# Patient Record
Sex: MALE | Race: WHITE | NOT HISPANIC OR LATINO | Employment: OTHER | URBAN - METROPOLITAN AREA
[De-identification: names, ages, dates, MRNs, and addresses within clinical notes are randomized per-mention and may not be internally consistent; named-entity substitution may affect disease eponyms.]

---

## 2024-07-18 ENCOUNTER — APPOINTMENT (OUTPATIENT)
Dept: CT IMAGING | Facility: HOSPITAL | Age: 69
DRG: 062 | End: 2024-07-18
Payer: MEDICARE

## 2024-07-18 ENCOUNTER — APPOINTMENT (OUTPATIENT)
Dept: GENERAL RADIOLOGY | Facility: HOSPITAL | Age: 69
DRG: 062 | End: 2024-07-18
Payer: MEDICARE

## 2024-07-18 ENCOUNTER — HOSPITAL ENCOUNTER (INPATIENT)
Facility: HOSPITAL | Age: 69
LOS: 5 days | Discharge: HOME | DRG: 062 | End: 2024-07-23
Attending: EMERGENCY MEDICINE | Admitting: INTERNAL MEDICINE
Payer: MEDICARE

## 2024-07-18 DIAGNOSIS — R91.8 RIGHT LOWER LOBE LUNG MASS: ICD-10-CM

## 2024-07-18 DIAGNOSIS — I63.9 CEREBROVASCULAR ACCIDENT (CVA), UNSPECIFIED MECHANISM: Primary | ICD-10-CM

## 2024-07-18 LAB
ABO GROUP BLD: NORMAL
ALBUMIN SERPL-MCNC: 3.9 G/DL (ref 3.5–5.2)
ALBUMIN/GLOB SERPL: 1.4 G/DL
ALP SERPL-CCNC: 113 U/L (ref 39–117)
ALT SERPL W P-5'-P-CCNC: 6 U/L (ref 1–41)
ANION GAP SERPL CALCULATED.3IONS-SCNC: 8.1 MMOL/L (ref 5–15)
APTT PPP: 26.9 SECONDS (ref 22.7–35.4)
AST SERPL-CCNC: 17 U/L (ref 1–40)
BASOPHILS # BLD AUTO: 0.05 10*3/MM3 (ref 0–0.2)
BASOPHILS NFR BLD AUTO: 0.5 % (ref 0–1.5)
BILIRUB SERPL-MCNC: 0.3 MG/DL (ref 0–1.2)
BLD GP AB SCN SERPL QL: NEGATIVE
BUN SERPL-MCNC: 9 MG/DL (ref 8–23)
BUN/CREAT SERPL: 8.7 (ref 7–25)
CALCIUM SPEC-SCNC: 9.1 MG/DL (ref 8.6–10.5)
CHLORIDE SERPL-SCNC: 103 MMOL/L (ref 98–107)
CO2 SERPL-SCNC: 27.9 MMOL/L (ref 22–29)
CREAT SERPL-MCNC: 1.03 MG/DL (ref 0.76–1.27)
DEPRECATED RDW RBC AUTO: 44.3 FL (ref 37–54)
EGFRCR SERPLBLD CKD-EPI 2021: 78.6 ML/MIN/1.73
EOSINOPHIL # BLD AUTO: 0.41 10*3/MM3 (ref 0–0.4)
EOSINOPHIL NFR BLD AUTO: 4 % (ref 0.3–6.2)
ERYTHROCYTE [DISTWIDTH] IN BLOOD BY AUTOMATED COUNT: 13 % (ref 12.3–15.4)
GLOBULIN UR ELPH-MCNC: 2.7 GM/DL
GLUCOSE BLDC GLUCOMTR-MCNC: 122 MG/DL (ref 70–130)
GLUCOSE BLDC GLUCOMTR-MCNC: 96 MG/DL (ref 70–130)
GLUCOSE BLDC GLUCOMTR-MCNC: 96 MG/DL (ref 70–130)
GLUCOSE SERPL-MCNC: 94 MG/DL (ref 65–99)
HCT VFR BLD AUTO: 42.3 % (ref 37.5–51)
HGB BLD-MCNC: 14 G/DL (ref 13–17.7)
HOLD SPECIMEN: NORMAL
HOLD SPECIMEN: NORMAL
IMM GRANULOCYTES # BLD AUTO: 0.02 10*3/MM3 (ref 0–0.05)
IMM GRANULOCYTES NFR BLD AUTO: 0.2 % (ref 0–0.5)
INR PPP: 0.98 (ref 0.9–1.1)
LYMPHOCYTES # BLD AUTO: 3.14 10*3/MM3 (ref 0.7–3.1)
LYMPHOCYTES NFR BLD AUTO: 30.7 % (ref 19.6–45.3)
MCH RBC QN AUTO: 31.3 PG (ref 26.6–33)
MCHC RBC AUTO-ENTMCNC: 33.1 G/DL (ref 31.5–35.7)
MCV RBC AUTO: 94.4 FL (ref 79–97)
MONOCYTES # BLD AUTO: 0.94 10*3/MM3 (ref 0.1–0.9)
MONOCYTES NFR BLD AUTO: 9.2 % (ref 5–12)
NEUTROPHILS NFR BLD AUTO: 5.66 10*3/MM3 (ref 1.7–7)
NEUTROPHILS NFR BLD AUTO: 55.4 % (ref 42.7–76)
NRBC BLD AUTO-RTO: 0 /100 WBC (ref 0–0.2)
PLATELET # BLD AUTO: 226 10*3/MM3 (ref 140–450)
PMV BLD AUTO: 10.1 FL (ref 6–12)
POTASSIUM SERPL-SCNC: 3.7 MMOL/L (ref 3.5–5.2)
PROT SERPL-MCNC: 6.6 G/DL (ref 6–8.5)
PROTHROMBIN TIME: 13.2 SECONDS (ref 11.7–14.2)
RBC # BLD AUTO: 4.48 10*6/MM3 (ref 4.14–5.8)
RH BLD: POSITIVE
SODIUM SERPL-SCNC: 139 MMOL/L (ref 136–145)
T&S EXPIRATION DATE: NORMAL
TROPONIN T SERPL HS-MCNC: 13 NG/L
WBC NRBC COR # BLD AUTO: 10.22 10*3/MM3 (ref 3.4–10.8)
WHOLE BLOOD HOLD COAG: NORMAL
WHOLE BLOOD HOLD SPECIMEN: NORMAL

## 2024-07-18 PROCEDURE — 82948 REAGENT STRIP/BLOOD GLUCOSE: CPT

## 2024-07-18 PROCEDURE — 85025 COMPLETE CBC W/AUTO DIFF WBC: CPT | Performed by: EMERGENCY MEDICINE

## 2024-07-18 PROCEDURE — 25010000002 TENECTEPLASE PER 50 MG: Performed by: EMERGENCY MEDICINE

## 2024-07-18 PROCEDURE — 93005 ELECTROCARDIOGRAM TRACING: CPT | Performed by: EMERGENCY MEDICINE

## 2024-07-18 PROCEDURE — 70496 CT ANGIOGRAPHY HEAD: CPT

## 2024-07-18 PROCEDURE — 3E03317 INTRODUCTION OF OTHER THROMBOLYTIC INTO PERIPHERAL VEIN, PERCUTANEOUS APPROACH: ICD-10-PCS | Performed by: HOSPITALIST

## 2024-07-18 PROCEDURE — 36415 COLL VENOUS BLD VENIPUNCTURE: CPT

## 2024-07-18 PROCEDURE — 82565 ASSAY OF CREATININE: CPT

## 2024-07-18 PROCEDURE — 71250 CT THORAX DX C-: CPT

## 2024-07-18 PROCEDURE — 99291 CRITICAL CARE FIRST HOUR: CPT

## 2024-07-18 PROCEDURE — 84484 ASSAY OF TROPONIN QUANT: CPT | Performed by: EMERGENCY MEDICINE

## 2024-07-18 PROCEDURE — 86901 BLOOD TYPING SEROLOGIC RH(D): CPT | Performed by: EMERGENCY MEDICINE

## 2024-07-18 PROCEDURE — 70498 CT ANGIOGRAPHY NECK: CPT

## 2024-07-18 PROCEDURE — 86850 RBC ANTIBODY SCREEN: CPT | Performed by: EMERGENCY MEDICINE

## 2024-07-18 PROCEDURE — 86900 BLOOD TYPING SEROLOGIC ABO: CPT | Performed by: EMERGENCY MEDICINE

## 2024-07-18 PROCEDURE — 25510000001 IOPAMIDOL PER 1 ML: Performed by: EMERGENCY MEDICINE

## 2024-07-18 PROCEDURE — 0042T HC CT CEREBRAL PERFUSION W/WO CONTRAST: CPT

## 2024-07-18 PROCEDURE — 71045 X-RAY EXAM CHEST 1 VIEW: CPT

## 2024-07-18 PROCEDURE — 85730 THROMBOPLASTIN TIME PARTIAL: CPT | Performed by: EMERGENCY MEDICINE

## 2024-07-18 PROCEDURE — 93010 ELECTROCARDIOGRAM REPORT: CPT | Performed by: INTERNAL MEDICINE

## 2024-07-18 PROCEDURE — 80053 COMPREHEN METABOLIC PANEL: CPT | Performed by: EMERGENCY MEDICINE

## 2024-07-18 PROCEDURE — 85610 PROTHROMBIN TIME: CPT | Performed by: EMERGENCY MEDICINE

## 2024-07-18 RX ORDER — ASPIRIN 300 MG/1
300 SUPPOSITORY RECTAL DAILY
Status: DISCONTINUED | OUTPATIENT
Start: 2024-07-19 | End: 2024-07-23 | Stop reason: HOSPADM

## 2024-07-18 RX ORDER — SODIUM CHLORIDE 0.9 % (FLUSH) 0.9 %
10 SYRINGE (ML) INJECTION AS NEEDED
Status: DISCONTINUED | OUTPATIENT
Start: 2024-07-18 | End: 2024-07-23 | Stop reason: HOSPADM

## 2024-07-18 RX ORDER — ASPIRIN 325 MG
325 TABLET ORAL DAILY
Status: DISCONTINUED | OUTPATIENT
Start: 2024-07-19 | End: 2024-07-23 | Stop reason: HOSPADM

## 2024-07-18 RX ORDER — ATORVASTATIN CALCIUM 80 MG/1
80 TABLET, FILM COATED ORAL NIGHTLY
Status: DISCONTINUED | OUTPATIENT
Start: 2024-07-18 | End: 2024-07-23 | Stop reason: HOSPADM

## 2024-07-18 RX ORDER — SODIUM CHLORIDE 0.9 % (FLUSH) 0.9 %
10 SYRINGE (ML) INJECTION ONCE
Status: DISCONTINUED | OUTPATIENT
Start: 2024-07-18 | End: 2024-07-23 | Stop reason: HOSPADM

## 2024-07-18 RX ORDER — SODIUM CHLORIDE 0.9 % (FLUSH) 0.9 %
10 SYRINGE (ML) INJECTION EVERY 12 HOURS SCHEDULED
Status: DISCONTINUED | OUTPATIENT
Start: 2024-07-18 | End: 2024-07-23 | Stop reason: HOSPADM

## 2024-07-18 RX ORDER — SODIUM CHLORIDE 0.9 % (FLUSH) 0.9 %
10 SYRINGE (ML) INJECTION
Status: COMPLETED | OUTPATIENT
Start: 2024-07-18 | End: 2024-07-18

## 2024-07-18 RX ORDER — SODIUM CHLORIDE 9 MG/ML
40 INJECTION, SOLUTION INTRAVENOUS AS NEEDED
Status: DISCONTINUED | OUTPATIENT
Start: 2024-07-18 | End: 2024-07-23 | Stop reason: HOSPADM

## 2024-07-18 RX ADMIN — ATORVASTATIN CALCIUM 80 MG: 80 TABLET, FILM COATED ORAL at 21:55

## 2024-07-18 RX ADMIN — Medication 21 MG: at 20:25

## 2024-07-18 RX ADMIN — IOPAMIDOL 150 ML: 755 INJECTION, SOLUTION INTRAVENOUS at 20:01

## 2024-07-18 RX ADMIN — Medication 10 ML: at 20:25

## 2024-07-18 NOTE — ED NOTES
Pt arrives via EMS for complaints of left arm numbness and tingling starting after dinner around 1850. Pt presents with flaccid left arm and slight drift to left leg. Pt presents with no facial droop, slurred speech or blurred vision. PTA of EMS pt took one aspirin with no resolution of symptoms.

## 2024-07-19 ENCOUNTER — APPOINTMENT (OUTPATIENT)
Dept: MRI IMAGING | Facility: HOSPITAL | Age: 69
DRG: 062 | End: 2024-07-19
Payer: MEDICARE

## 2024-07-19 ENCOUNTER — APPOINTMENT (OUTPATIENT)
Dept: CARDIOLOGY | Facility: HOSPITAL | Age: 69
DRG: 062 | End: 2024-07-19
Payer: MEDICARE

## 2024-07-19 LAB
ANION GAP SERPL CALCULATED.3IONS-SCNC: 6.3 MMOL/L (ref 5–15)
AORTIC DIMENSIONLESS INDEX: 0.9 (DI)
BASOPHILS # BLD AUTO: 0.05 10*3/MM3 (ref 0–0.2)
BASOPHILS NFR BLD AUTO: 0.4 % (ref 0–1.5)
BH CV ECHO MEAS - AO MAX PG: 9.4 MMHG
BH CV ECHO MEAS - AO MEAN PG: 4.7 MMHG
BH CV ECHO MEAS - AO ROOT DIAM: 3.6 CM
BH CV ECHO MEAS - AO V2 MAX: 153.4 CM/SEC
BH CV ECHO MEAS - AO V2 VTI: 27.5 CM
BH CV ECHO MEAS - AVA(I,D): 2.7 CM2
BH CV ECHO MEAS - EDV(CUBED): 146.2 ML
BH CV ECHO MEAS - EDV(MOD-SP2): 90 ML
BH CV ECHO MEAS - EDV(MOD-SP4): 95 ML
BH CV ECHO MEAS - EF(MOD-BP): 57.2 %
BH CV ECHO MEAS - EF(MOD-SP2): 56.7 %
BH CV ECHO MEAS - EF(MOD-SP4): 58.9 %
BH CV ECHO MEAS - ESV(CUBED): 98.9 ML
BH CV ECHO MEAS - ESV(MOD-SP2): 39 ML
BH CV ECHO MEAS - ESV(MOD-SP4): 39 ML
BH CV ECHO MEAS - FS: 12.2 %
BH CV ECHO MEAS - IVS/LVPW: 0.9 CM
BH CV ECHO MEAS - IVSD: 0.73 CM
BH CV ECHO MEAS - LAT PEAK E' VEL: 12.8 CM/SEC
BH CV ECHO MEAS - LV DIASTOLIC VOL/BSA (35-75): 45.7 CM2
BH CV ECHO MEAS - LV MASS(C)D: 142.6 GRAMS
BH CV ECHO MEAS - LV MAX PG: 7.4 MMHG
BH CV ECHO MEAS - LV MEAN PG: 4 MMHG
BH CV ECHO MEAS - LV SYSTOLIC VOL/BSA (12-30): 18.8 CM2
BH CV ECHO MEAS - LV V1 MAX: 136 CM/SEC
BH CV ECHO MEAS - LV V1 VTI: 24.9 CM
BH CV ECHO MEAS - LVIDD: 5.3 CM
BH CV ECHO MEAS - LVIDS: 4.6 CM
BH CV ECHO MEAS - LVOT AREA: 3 CM2
BH CV ECHO MEAS - LVOT DIAM: 1.96 CM
BH CV ECHO MEAS - LVPWD: 0.82 CM
BH CV ECHO MEAS - MED PEAK E' VEL: 6.3 CM/SEC
BH CV ECHO MEAS - MR MAX PG: 18.6 MMHG
BH CV ECHO MEAS - MR MAX VEL: 215.8 CM/SEC
BH CV ECHO MEAS - MV A DUR: 0.09 SEC
BH CV ECHO MEAS - MV A MAX VEL: 79.3 CM/SEC
BH CV ECHO MEAS - MV DEC SLOPE: 346.8 CM/SEC2
BH CV ECHO MEAS - MV DEC TIME: 198 SEC
BH CV ECHO MEAS - MV E MAX VEL: 75 CM/SEC
BH CV ECHO MEAS - MV E/A: 0.95
BH CV ECHO MEAS - MV MAX PG: 3.4 MMHG
BH CV ECHO MEAS - MV MEAN PG: 1.24 MMHG
BH CV ECHO MEAS - MV P1/2T: 63.5 MSEC
BH CV ECHO MEAS - MV V2 VTI: 21.6 CM
BH CV ECHO MEAS - MVA(P1/2T): 3.5 CM2
BH CV ECHO MEAS - MVA(VTI): 3.5 CM2
BH CV ECHO MEAS - PA ACC TIME: 0.15 SEC
BH CV ECHO MEAS - PA V2 MAX: 110.3 CM/SEC
BH CV ECHO MEAS - PULM A REVS DUR: 0.08 SEC
BH CV ECHO MEAS - PULM A REVS VEL: 42.4 CM/SEC
BH CV ECHO MEAS - PULM DIAS VEL: 56.7 CM/SEC
BH CV ECHO MEAS - PULM S/D: 1.35
BH CV ECHO MEAS - PULM SYS VEL: 76.6 CM/SEC
BH CV ECHO MEAS - RAP SYSTOLE: 3 MMHG
BH CV ECHO MEAS - RV MAX PG: 5.2 MMHG
BH CV ECHO MEAS - RV V1 MAX: 113.8 CM/SEC
BH CV ECHO MEAS - RV V1 VTI: 20.8 CM
BH CV ECHO MEAS - RVSP: 9 MMHG
BH CV ECHO MEAS - SV(LVOT): 75.3 ML
BH CV ECHO MEAS - SV(MOD-SP2): 51 ML
BH CV ECHO MEAS - SV(MOD-SP4): 56 ML
BH CV ECHO MEAS - SVI(LVOT): 36.2 ML/M2
BH CV ECHO MEAS - SVI(MOD-SP2): 24.6 ML/M2
BH CV ECHO MEAS - SVI(MOD-SP4): 27 ML/M2
BH CV ECHO MEAS - TAPSE (>1.6): 2.35 CM
BH CV ECHO MEAS - TR MAX PG: 6 MMHG
BH CV ECHO MEAS - TR MAX VEL: 116.7 CM/SEC
BH CV ECHO MEASUREMENTS AVERAGE E/E' RATIO: 7.85
BH CV ECHO SHUNT ASSESSMENT PERFORMED (HIDDEN SCRIPTING): 1
BH CV XLRA - TDI S': 14.9 CM/SEC
BUN SERPL-MCNC: 8 MG/DL (ref 8–23)
BUN/CREAT SERPL: 8.6 (ref 7–25)
CALCIUM SPEC-SCNC: 8.8 MG/DL (ref 8.6–10.5)
CHLORIDE SERPL-SCNC: 106 MMOL/L (ref 98–107)
CHOLEST SERPL-MCNC: 185 MG/DL (ref 0–200)
CO2 SERPL-SCNC: 26.7 MMOL/L (ref 22–29)
CREAT SERPL-MCNC: 0.93 MG/DL (ref 0.76–1.27)
DEPRECATED RDW RBC AUTO: 46.7 FL (ref 37–54)
EGFRCR SERPLBLD CKD-EPI 2021: 88.9 ML/MIN/1.73
EOSINOPHIL # BLD AUTO: 0.37 10*3/MM3 (ref 0–0.4)
EOSINOPHIL NFR BLD AUTO: 3.3 % (ref 0.3–6.2)
ERYTHROCYTE [DISTWIDTH] IN BLOOD BY AUTOMATED COUNT: 13 % (ref 12.3–15.4)
GLUCOSE BLDC GLUCOMTR-MCNC: 102 MG/DL (ref 70–130)
GLUCOSE BLDC GLUCOMTR-MCNC: 103 MG/DL (ref 70–130)
GLUCOSE BLDC GLUCOMTR-MCNC: 103 MG/DL (ref 70–130)
GLUCOSE BLDC GLUCOMTR-MCNC: 138 MG/DL (ref 70–130)
GLUCOSE SERPL-MCNC: 106 MG/DL (ref 65–99)
HBA1C MFR BLD: 6.2 % (ref 4.8–5.6)
HCT VFR BLD AUTO: 42.1 % (ref 37.5–51)
HDLC SERPL-MCNC: 31 MG/DL (ref 40–60)
HGB BLD-MCNC: 13.8 G/DL (ref 13–17.7)
IMM GRANULOCYTES # BLD AUTO: 0.03 10*3/MM3 (ref 0–0.05)
IMM GRANULOCYTES NFR BLD AUTO: 0.3 % (ref 0–0.5)
LDLC SERPL CALC-MCNC: 137 MG/DL (ref 0–100)
LDLC/HDLC SERPL: 4.39 {RATIO}
LEFT ATRIUM VOLUME INDEX: 18.9 ML/M2
LYMPHOCYTES # BLD AUTO: 2.81 10*3/MM3 (ref 0.7–3.1)
LYMPHOCYTES NFR BLD AUTO: 25.2 % (ref 19.6–45.3)
MCH RBC QN AUTO: 31.4 PG (ref 26.6–33)
MCHC RBC AUTO-ENTMCNC: 32.8 G/DL (ref 31.5–35.7)
MCV RBC AUTO: 95.9 FL (ref 79–97)
MONOCYTES # BLD AUTO: 0.97 10*3/MM3 (ref 0.1–0.9)
MONOCYTES NFR BLD AUTO: 8.7 % (ref 5–12)
NEUTROPHILS NFR BLD AUTO: 6.92 10*3/MM3 (ref 1.7–7)
NEUTROPHILS NFR BLD AUTO: 62.1 % (ref 42.7–76)
NRBC BLD AUTO-RTO: 0 /100 WBC (ref 0–0.2)
PLATELET # BLD AUTO: 221 10*3/MM3 (ref 140–450)
PMV BLD AUTO: 10.6 FL (ref 6–12)
POTASSIUM SERPL-SCNC: 3.7 MMOL/L (ref 3.5–5.2)
RBC # BLD AUTO: 4.39 10*6/MM3 (ref 4.14–5.8)
SODIUM SERPL-SCNC: 139 MMOL/L (ref 136–145)
TRIGL SERPL-MCNC: 90 MG/DL (ref 0–150)
VLDLC SERPL-MCNC: 17 MG/DL (ref 5–40)
WBC NRBC COR # BLD AUTO: 11.15 10*3/MM3 (ref 3.4–10.8)

## 2024-07-19 PROCEDURE — 0 GADOBENATE DIMEGLUMINE 529 MG/ML SOLUTION: Performed by: INTERNAL MEDICINE

## 2024-07-19 PROCEDURE — 97166 OT EVAL MOD COMPLEX 45 MIN: CPT

## 2024-07-19 PROCEDURE — 80048 BASIC METABOLIC PNL TOTAL CA: CPT

## 2024-07-19 PROCEDURE — 82948 REAGENT STRIP/BLOOD GLUCOSE: CPT

## 2024-07-19 PROCEDURE — 97161 PT EVAL LOW COMPLEX 20 MIN: CPT

## 2024-07-19 PROCEDURE — 99222 1ST HOSP IP/OBS MODERATE 55: CPT | Performed by: STUDENT IN AN ORGANIZED HEALTH CARE EDUCATION/TRAINING PROGRAM

## 2024-07-19 PROCEDURE — 83036 HEMOGLOBIN GLYCOSYLATED A1C: CPT | Performed by: INTERNAL MEDICINE

## 2024-07-19 PROCEDURE — 85025 COMPLETE CBC W/AUTO DIFF WBC: CPT

## 2024-07-19 PROCEDURE — 93306 TTE W/DOPPLER COMPLETE: CPT | Performed by: INTERNAL MEDICINE

## 2024-07-19 PROCEDURE — 97116 GAIT TRAINING THERAPY: CPT

## 2024-07-19 PROCEDURE — A9577 INJ MULTIHANCE: HCPCS | Performed by: INTERNAL MEDICINE

## 2024-07-19 PROCEDURE — 70553 MRI BRAIN STEM W/O & W/DYE: CPT

## 2024-07-19 PROCEDURE — 80061 LIPID PANEL: CPT | Performed by: INTERNAL MEDICINE

## 2024-07-19 PROCEDURE — 93306 TTE W/DOPPLER COMPLETE: CPT

## 2024-07-19 PROCEDURE — 97150 GROUP THERAPEUTIC PROCEDURES: CPT

## 2024-07-19 RX ADMIN — Medication 10 ML: at 21:12

## 2024-07-19 RX ADMIN — GADOBENATE DIMEGLUMINE 17 ML: 529 INJECTION, SOLUTION INTRAVENOUS at 17:38

## 2024-07-19 RX ADMIN — Medication 10 ML: at 08:00

## 2024-07-19 RX ADMIN — ATORVASTATIN CALCIUM 80 MG: 80 TABLET, FILM COATED ORAL at 21:10

## 2024-07-19 RX ADMIN — ASPIRIN 325 MG: 325 TABLET ORAL at 21:39

## 2024-07-19 NOTE — PROGRESS NOTES
"  Daily Progress Note.   [facility]  7/19/2024    Patient:  Name:  Roby Ott  MRN:  [id number]  1955  68 y.o.  male         CC:stroke s/p tnk     Interval History:  Reviewed chart, dw overnight intensivist  Pt denies worsening weakness, confusion difficulty with speech  No cp no cough no phelps no sputum no palpitations  Depressed mood    Physical Exam:  /76   Pulse 56   Temp 98.3 °F (36.8 °C) (Oral)   Resp 16   Ht 185.4 cm (73\")   Wt 83.7 kg (184 lb 8.4 oz)   SpO2 92%   BMI 24.35 kg/m²   Body mass index is 24.35 kg/m².    Intake/Output Summary (Last 24 hours) at 7/19/2024 0735  Last data filed at 7/19/2024 0300  Gross per 24 hour   Intake 540 ml   Output --   Net 540 ml     General appearance: Nontoxic, conversant   Eyes: anicteric sclerae, moist conjunctivae; no lidlag;    HENT: Atraumatic; oropharynx clear with moist mucous membranes    Neck: Trachea midline;  supple   Lungs: CTA, with normal respiratory effort and no intercostal retractions  CV: RRR, no rub   Abdomen: Soft, nontender; BS+  Extremities: No peripheral edema or ext lad  Skin: WWP no diffuse visible rash  Psych: Appropriate affect, alert   Neuro: speech intact normal cognition answers questions, weak    Data Review:  Notable Labs:  Results from last 7 days   Lab Units 07/19/24 0401 07/18/24 1948   WBC 10*3/mm3 11.15* 10.22   HEMOGLOBIN g/dL 13.8 14.0   PLATELETS 10*3/mm3 221 226     Results from last 7 days   Lab Units 07/19/24  0401 07/18/24 1948   SODIUM mmol/L 139 139   POTASSIUM mmol/L 3.7 3.7   CHLORIDE mmol/L 106 103   CO2 mmol/L 26.7 27.9   BUN mg/dL 8 9   CREATININE mg/dL 0.93 1.03   GLUCOSE mg/dL 106* 94   CALCIUM mg/dL 8.8 9.1   Estimated Creatinine Clearance: 88.8 mL/min (by C-G formula based on SCr of 0.93 mg/dL).    Results from last 7 days   Lab Units 07/19/24 0401 07/18/24 1948   AST (SGOT) U/L  --  17   ALT (SGPT) U/L  --  6   PLATELETS 10*3/mm3 221 226         "     Imaging:  Reviewed chest images personally from past 3 days    ASSESSMENT  /  PLAN:  Acute strokelike symptoms, status post TNKase administration  Right lower lobe lung mass  HTN  Tobacco abuse    Will need biopsy of rll, will allow tnk to wear off  Concerning for lung cancer until proven otherwise.  Discussed with the patient and his wife at bedside they expressed understanding    Stroke evaluation ongiong per stroke service  Post tnk precautions  Cardene prn blood pressure contorl    All issues new to me today.  Prior hospital course, labs and imaging reviewed.    Plan of care and patient course was reviewed with multidisciplinary team in morning rounds.        Electronically signed by Benson Cunha MD, 07/19/24, 7:35 AM EDT.  [facility]

## 2024-07-19 NOTE — ED PROVIDER NOTES
EMERGENCY DEPARTMENT ENCOUNTER  Room Number:  I375/1  PCP: Provider, No Known  Independent Historians: Patient and EMS      HPI:  Chief Complaint: had concerns including Numbness.     A complete HPI/ROS/PMH/PSH/SH/FH are unobtainable due to: None    Chronic or social conditions impacting patient care (Social Determinants of Health): None      Context: Roby Ott is a 68 y.o. male with a medical history of unknown past medical history who presents to the ED c/o acute left arm and left leg numbness and weakness.  The patient was last seen normal at 1850 today.  The patient had sudden onset left arm and left leg weakness and numbness.  He was brought to room 16 and stroke alert was called.  Patient denies pain.  He denies trauma.  He denies recent bleeding.  He denies any recent surgeries.  He denies prior intracranial surgery.  Stroke process was activated.  He was sent to CT emergently.      Review of prior external notes (non-ED) -and- Review of prior external test results outside of this encounter: Extensive review of the EPIC system as well as [facility] reveals no prior visit notes and no prior diagnostic studies available for review.    Prescription drug monitoring program review:         PAST MEDICAL HISTORY  Active Ambulatory Problems     Diagnosis Date Noted    No Active Ambulatory Problems     Resolved Ambulatory Problems     Diagnosis Date Noted    No Resolved Ambulatory Problems     No Additional Past Medical History         PAST SURGICAL HISTORY  Past Surgical History:   Procedure Laterality Date    SKIN SURGERY  1994    removal of ingrown hairs on face         FAMILY HISTORY  History reviewed. No pertinent family history.      SOCIAL HISTORY  Social History     Socioeconomic History    Marital status:    Tobacco Use    Smoking status: Every Day     Current packs/day: 2.00     Average packs/day: 2.0 packs/day for 54.5 years (109.1 ttl pk-yrs)     Types: Cigarettes     Start date: 1970    Vaping Use    Vaping status: Never Used   Substance and Sexual Activity    Alcohol use: Not Currently    Drug use: Not Currently     Types: Marijuana    Sexual activity: Defer         ALLERGIES  Patient has no known allergies.      REVIEW OF SYSTEMS  Review of Systems  Included in HPI  All systems reviewed and negative except for those discussed in HPI.      PHYSICAL EXAM    I have reviewed the triage vital signs and nursing notes.    ED Triage Vitals   Temp Heart Rate Resp BP SpO2   07/18/24 1942 07/18/24 1943 07/18/24 1942 07/18/24 1942 07/18/24 1942   99.1 °F (37.3 °C) 103 18 (!) 182/92 98 %      Temp src Heart Rate Source Patient Position BP Location FiO2 (%)   07/18/24 1942 -- 07/18/24 1942 07/18/24 1942 --   Oral  Lying Left arm        Physical Exam  GENERAL: Awake, alert, no acute distress  SKIN: Warm, dry  HENT: Normocephalic, atraumatic  EYES: no scleral icterus  CV: regular rhythm, regular rate  RESPIRATORY: normal effort, lungs clear  ABDOMEN: soft, nontender, nondistended  MUSCULOSKELETAL: no deformity  NEURO: alert, moves all extremities, follows commands.  Moderate weakness to the left upper extremity and mild weakness of the left lower extremity.  Cranial nerves II through XII intact.            LAB RESULTS  Recent Results (from the past 24 hour(s))   POC Glucose Once    Collection Time: 07/18/24  7:44 PM    Specimen: Blood   Result Value Ref Range    Glucose 96 70 - 130 mg/dL   POC Glucose Once    Collection Time: 07/18/24  7:45 PM    Specimen: Blood   Result Value Ref Range    Glucose 96 70 - 130 mg/dL   Comprehensive Metabolic Panel    Collection Time: 07/18/24  7:48 PM    Specimen: Arm, Right; Blood   Result Value Ref Range    Glucose 94 65 - 99 mg/dL    BUN 9 8 - 23 mg/dL    Creatinine 1.03 0.76 - 1.27 mg/dL    Sodium 139 136 - 145 mmol/L    Potassium 3.7 3.5 - 5.2 mmol/L    Chloride 103 98 - 107 mmol/L    CO2 27.9 22.0 - 29.0 mmol/L    Calcium 9.1 8.6 - 10.5 mg/dL    Total Protein 6.6 6.0 -  8.5 g/dL    Albumin 3.9 3.5 - 5.2 g/dL    ALT (SGPT) 6 1 - 41 U/L    AST (SGOT) 17 1 - 40 U/L    Alkaline Phosphatase 113 39 - 117 U/L    Total Bilirubin 0.3 0.0 - 1.2 mg/dL    Globulin 2.7 gm/dL    A/G Ratio 1.4 g/dL    BUN/Creatinine Ratio 8.7 7.0 - 25.0    Anion Gap 8.1 5.0 - 15.0 mmol/L    eGFR 78.6 >60.0 mL/min/1.73   Protime-INR    Collection Time: 07/18/24  7:48 PM    Specimen: Arm, Right; Blood   Result Value Ref Range    Protime 13.2 11.7 - 14.2 Seconds    INR 0.98 0.90 - 1.10   aPTT    Collection Time: 07/18/24  7:48 PM    Specimen: Arm, Right; Blood   Result Value Ref Range    PTT 26.9 22.7 - 35.4 seconds   Single High Sensitivity Troponin T    Collection Time: 07/18/24  7:48 PM    Specimen: Arm, Right; Blood   Result Value Ref Range    HS Troponin T 13 <22 ng/L   Type & Screen    Collection Time: 07/18/24  7:48 PM    Specimen: Arm, Right; Blood   Result Value Ref Range    ABO Type O     RH type Positive     Antibody Screen Negative     T&S Expiration Date 7/21/2024 11:59:59 PM    Green Top (Gel)    Collection Time: 07/18/24  7:48 PM   Result Value Ref Range    Extra Tube Hold for add-ons.    Lavender Top    Collection Time: 07/18/24  7:48 PM   Result Value Ref Range    Extra Tube hold for add-on    Gold Top - SST    Collection Time: 07/18/24  7:48 PM   Result Value Ref Range    Extra Tube Hold for add-ons.    Light Blue Top    Collection Time: 07/18/24  7:48 PM   Result Value Ref Range    Extra Tube Hold for add-ons.    CBC Auto Differential    Collection Time: 07/18/24  7:48 PM    Specimen: Arm, Right; Blood   Result Value Ref Range    WBC 10.22 3.40 - 10.80 10*3/mm3    RBC 4.48 4.14 - 5.80 10*6/mm3    Hemoglobin 14.0 13.0 - 17.7 g/dL    Hematocrit 42.3 37.5 - 51.0 %    MCV 94.4 79.0 - 97.0 fL    MCH 31.3 26.6 - 33.0 pg    MCHC 33.1 31.5 - 35.7 g/dL    RDW 13.0 12.3 - 15.4 %    RDW-SD 44.3 37.0 - 54.0 fl    MPV 10.1 6.0 - 12.0 fL    Platelets 226 140 - 450 10*3/mm3    Neutrophil % 55.4 42.7 - 76.0 %     Lymphocyte % 30.7 19.6 - 45.3 %    Monocyte % 9.2 5.0 - 12.0 %    Eosinophil % 4.0 0.3 - 6.2 %    Basophil % 0.5 0.0 - 1.5 %    Immature Grans % 0.2 0.0 - 0.5 %    Neutrophils, Absolute 5.66 1.70 - 7.00 10*3/mm3    Lymphocytes, Absolute 3.14 (H) 0.70 - 3.10 10*3/mm3    Monocytes, Absolute 0.94 (H) 0.10 - 0.90 10*3/mm3    Eosinophils, Absolute 0.41 (H) 0.00 - 0.40 10*3/mm3    Basophils, Absolute 0.05 0.00 - 0.20 10*3/mm3    Immature Grans, Absolute 0.02 0.00 - 0.05 10*3/mm3    nRBC 0.0 0.0 - 0.2 /100 WBC   ECG 12 Lead Stroke Evaluation    Collection Time: 07/18/24  8:06 PM   Result Value Ref Range    QT Interval 396 ms    QTC Interval 480 ms   POC Glucose Once    Collection Time: 07/18/24  9:09 PM    Specimen: Blood   Result Value Ref Range    Glucose 122 70 - 130 mg/dL         RADIOLOGY  CT Chest Without Contrast Diagnostic    Result Date: 7/18/2024  CT CHEST WO CONTRAST DIAGNOSTIC-  DATE OF EXAM: 7/18/2024 8:50 PM  INDICATION: Abnormal finding on CT of the neck in the right hilum of the lung.  COMPARISON: CTA neck 7/18/2024. Chest radiograph 7/18/2024.  TECHNIQUE: Multiple contiguous axial images were acquired through the chest without the intravenous administration of contrast. Reformatted coronal and sagittal sequences were also reviewed. Radiation dose reduction techniques were utilized, including automated exposure control and exposure modulation based on body size.  FINDINGS: Multilobulated solid mass in the right lower lobe measuring 38 mm x 32 mm (axial series 3 image 78), with mild adjacent patchy groundglass opacities and interstitial thickening. An adjacent sub-6 mm satellite nodule is seen lateral and inferior to the mass (axial series 3 image 83). Subtle nodular groundglass opacities in the right upper lobe (axial series 3 image 48), the right middle lobe (axial series 3 image 72), and the left upper lobe (axial series 3 image 56), likely infectious/inflammatory. Additional sub-6 mm pulmonary nodule in  the lateral base of the left lower lobe (axial series 3 image 87). Benign calcified granulomas in the lingula. Mild apical predominant chronic emphysematous changes in both lungs with biapical pleural-parenchymal scarring. The central airways are widely patent. No pneumothorax or pleural effusion.  The heart and great vessels of the chest are normal in size. Moderate calcified coronary artery disease. No pericardial effusion. No definite pathologically enlarged intrathoracic lymph nodes are identified, although evaluation is mildly limited by the lack of intravenous contrast.  Limited imaging of the upper abdomen is unremarkable.  Large 50 mm x 30 mm cystic mass in the subcutaneous fat immediately deep to the skin of the posterior upper right chest wall (axial series 2 image 13), possible benign epidermal inclusion cyst. No acute osseous abnormality or concerning osseous lesion.       1. Multilobulated 38 mm x 32 mm mass in the right lower lobe, probable primary lung malignancy. 2. Additional sub-6 mm pulmonary nodules in each lower lobe, which are nonspecific but metastatic disease is not excluded. 3. Scattered groundglass opacities in both lungs, likely infectious/inflammatory. 4. Moderate calcified coronary artery disease. 5. Large 5 cm x 3 cm cystic mass in the subcutaneous fat immediately deep to the skin of the posterior upper right chest wall, possible benign epidermal inclusion cyst.  This report was finalized on 7/18/2024 9:45 PM by Josué Morton MD on Workstation: [id number]      CT Angiogram Head w AI Analysis of LVO, CT Angiogram Neck, CT CEREBRAL PERFUSION WITH & WITHOUT CONTRAST    Result Date: 7/18/2024  CT ANGIOGRAM NECK AND HEAD WITH CONTRAST AND CT PERFUSION WITH CONTRAST  HISTORY: Stroke, right arm arm weakness.  COMPARISON: None.  Initially, a noncontrasted CT examination of the brain was performed. There is no evidence of acute infarction or of intracranial hemorrhage. There is a calcified  lesion involving the right parietal bone superolaterally, well demarcated with central calcification measuring approximately 3 cm in the AP dimension, 4 cm in the transverse dimension and approximately 1 cm in the craniocaudal dimension.  CT PERFUSION: There was no evidence of abnormal perfusion.  CT angiogram of the neck and head with contrast: The great vessels are arranged in a classic configuration. There is eccentric noncalcified plaque appreciated involving in the left common carotid artery proximally. This results in mild stenosis. Atherosclerotic disease is appreciated involving the carotid bifurcations bilaterally but with 0% stenosis on the right. On the left there is mild irregularity and mild stenosis (estimated to be approximately 10 to 15% maximally using NASCET criteria. Vascular calcification involving the carotid siphons are appreciated bilaterally without high-grade stenosis. The left A1 segment is mildly hypoplastic. The proximal aspects of the anterior and middle cerebral arteries appear otherwise unremarkable.  The right vertebral artery is dominant. The left vertebral artery arises directly from the aortic arch. The cervical segments are of relatively uniform caliber. The basilar artery and left posterior cerebral artery appear unremarkable. There is a fetal origin right posterior cerebral artery.  Sagittal reconstructions demonstrate reversal of the normal cervical lordosis. There is moderate to severe loss of disc height at C5-6 and C6-7. Moderate to severe foraminal stenosis is present on the right at C5-6 and C6-7 secondary to loss of disc height and uncovertebral degenerative disease.      There is no evidence of acute infarction, intracranial hemorrhage or of hydrocephalus. Further evaluation could be performed with a MRI examination of the brain with and without contrast. A benign-appearing calcified lesion noted involving the right parietal bone superolaterally measuring approximately 3  x 4 x 1 cm in size with benign characteristics. Although nonspecific, this likely represents a calcified hematoma. Clinical correlation suggested. There is atherosclerotic disease appreciated involving the carotid bifurcations bilaterally with 0% stenosis on the right and 10 to 15% stenosis on the left. There is mild stenosis involving the origin of the left common carotid artery. There is no evidence of proximal intracranial arterial high-grade stenosis or occlusion.  Note: This is a preliminary report. 3-dimensional reconstructions are not yet available for review.   AI analysis of LVO was utilized.  Radiation dose reduction techniques were utilized, including automated exposure control and exposure modulation based on body size.       XR Chest 1 View    Result Date: 7/18/2024  XR CHEST 1 VW-  CLINICAL HISTORY:  Acute Stroke Protocol (onset < 12 hrs); I63.9-Cerebral infarction, unspecified  COMPARISON:  None  FINDINGS: A single view of the chest demonstrates the heart to be within normal limits in size. There is an area of increased density present inferior lateral to the right hilum, nonspecific. This may represent a focal area of infiltrate but is suspicious for an underlying mass. It measures approximately 4.5 cm in diameter. A CT examination of the chest is recommended. There is no evidence of effusion or of congestive failure.  The above information was called and discussed with Dr. Thomas.  This report was finalized on 7/18/2024 8:41 PM by Dr. Raymond Rea M.D on Workstation: [id number]         MEDICATIONS GIVEN IN ER  Medications   sodium chloride 0.9 % flush 10 mL (has no administration in time range)   sodium chloride 0.9 % flush 10 mL (10 mL Intravenous Given 7/18/24 2025)     Followed by   tenecteplase for stroke (TNKASE) injection 21 mg (21 mg Intravenous Given 7/18/24 2025)     Followed by   sodium chloride 0.9 % flush 10 mL (has no administration in time range)   sodium chloride 0.9 % flush 10  mL (has no administration in time range)   sodium chloride 0.9 % flush 10 mL (has no administration in time range)   sodium chloride 0.9 % infusion 40 mL (has no administration in time range)   aspirin tablet 325 mg (has no administration in time range)     Or   aspirin suppository 300 mg (has no administration in time range)   atorvastatin (LIPITOR) tablet 80 mg (80 mg Oral Given 7/18/24 2155)   niCARdipine (CARDENE) 25 mg in 250 mL NS infusion kit (has no administration in time range)   iopamidol (ISOVUE-370) 76 % injection 150 mL (150 mL Intravenous Given by Other 7/18/24 2001)         ORDERS PLACED DURING THIS VISIT:  Orders Placed This Encounter   Procedures    CT Angiogram Head w AI Analysis of LVO    CT Angiogram Neck    CT CEREBRAL PERFUSION WITH & WITHOUT CONTRAST    XR Chest 1 View    CT Head Without Contrast    CT Chest Without Contrast Diagnostic    [facility] Draw    Comprehensive Metabolic Panel    Protime-INR    aPTT    Single High Sensitivity Troponin T    CBC Auto Differential    Hemoglobin A1c    Lipid Panel    NPO Diet NPO Type: Strict NPO    Initiate Department's Acute Stroke Process (Team D, Code 19, etc.)    Perform NIH Stroke Scale    Measure Actual Weight    Notify Provider    Notify Provider for SBP Greater Than 140 for Hemorrhagic Stroke Patient    Head of Bed 30 Degrees or Less    Undress and Gown    Vital Signs    No Hypotonic Fluids    Nursing Dysphagia Screening (Complete Prior to Giving anything PO)    RN to Place Order SLP Consult (IF swallow screen failed) - Eval & Treat Choosing Reason of RN Dysphagia Screen Failed    Follow Department Guidelines - Notify Pharmacy of Thrombolytic Therapy Administration    NIHSS Assessment - Prior to Thrombolytic Administration    Neuro Checks Per Post-Thrombolytic Therapy Flowsheet    Notify Provider and Activate Thrombolytic Induced Angioedema Order Set (see comments)    Vital Signs Per Post-Thrombolytic Therapy Flowsheet    Maintain Blood Pressure  Per Listed Parameters    No Arterial Punctures, IM Injections or Invasive Procedures For 24 Hours    No Anticoagulant / Antiplatelet Agents For 24 Hours    Vital Signs Thrombolytic Monitoring    Vital Signs After Thrombolytic Monitoring    Continuous Pulse Oximetry    Telemetry - Place Orders & Notify Provider of Results When Patient Experiences Acute Chest Pain, Dysrhythmia or Respiratory Distress    Notify Provider    Nursing Dysphagia Screening (Complete Prior to Giving Anything By Mouth)    RN to Place Order SLP Consult - Eval & Treat Choosing Reason of RN Dysphagia Screen Failed    Nurse to Call MD or Nutrition Services for Diet if Patient Passes Dysphagia Screen    Intake and Output    Neuro Checks    NIHSS Assessment    Order CT Head Without Contrast for Neurological Decline    Provide Stroke Education Material    Tobacco Cessation Education    Repeat NIHSS Immediately After Thrombolytic Infusion Complete    AVOID Indwelling Urinary Catheterization During Thrombolytic Infusion & For At Least 24 Hours Post Infusion    Avoid venipuncture, if venipuncture necessary, attempt to use an upper extremity vessel that can be manually compressed    Saline Lock & Maintain IV Access    Place Sequential Compression Device    Maintain Sequential Compression Device    Activity As Tolerated    Neuro Checks    Code Status and Medical Interventions:    Inpatient Neurology Consult Stroke    Inpatient Neurology Consult Stroke    Pulmonology (on-call MD unless specified)    Notify Stroke Coordinator    Inpatient Case Management  Consult    Inpatient Diabetes Educator Consult    Inpatient Neurology Consult Stroke    OT Consult: Eval & Treat    PT Consult: Eval & Treat As Tolerated    Oxygen Therapy- Nasal Cannula; Titrate 1-6 LPM Per SpO2; 90 - 95%    SLP Consult: Eval & Treat Communication Disorder    POC Glucose Once    POC Glucose Once    POC Glucose Q6H    POC Glucose Once    ECG 12 Lead Stroke Evaluation    Type  & Screen    Insert Large-Bore Peripheral IV - RIGHT AC Preferred    Insert Peripheral IV    Inpatient Admission    Bleeding Precautions    Post Thrombolytic Precautions    CBC & Differential    Green Top (Gel)    Lavender Top    Gold Top - SST    Light Blue Top         OUTPATIENT MEDICATION MANAGEMENT:  Current Facility-Administered Medications Ordered in Epic   Medication Dose Route Frequency Provider Last Rate Last Admin    [START ON 7/19/2024] aspirin tablet 325 mg  325 mg Oral Daily Naresh Kirkpatrick MD        Or    [START ON 7/19/2024] aspirin suppository 300 mg  300 mg Rectal Daily Naresh Kirkpatrick MD        atorvastatin (LIPITOR) tablet 80 mg  80 mg Oral Nightly Narehs Kirkpatrick MD   80 mg at 07/18/24 2155    niCARdipine (CARDENE) 25 mg in 250 mL NS infusion kit  5-15 mg/hr Intravenous Titrated Naresh Kirkpatrick MD        sodium chloride 0.9 % flush 10 mL  10 mL Intravenous PRN Sarabjit Thomas MD        sodium chloride 0.9 % flush 10 mL  10 mL Intravenous Once Sarabjit Thomas MD        sodium chloride 0.9 % flush 10 mL  10 mL Intravenous Q12H Naresh Kirkpatrick MD        sodium chloride 0.9 % flush 10 mL  10 mL Intravenous PRN Naresh Kirkpatrick MD        sodium chloride 0.9 % infusion 40 mL  40 mL Intravenous PRN Naresh Kirkpatrick MD         No current [facility]-ordered outpatient medications on file.         PROCEDURES  Procedures      Critical care provider statement:    Critical care time (minutes): 55.   Critical care time was exclusive of:  Separately billable procedures and treating other patients   Critical care was necessary to treat or prevent imminent or life-threatening deterioration of the following conditions:  CNS Failure   Critical care was time spent personally by me on the following activities:  Development of treatment plan with patient or surrogate, discussions with consultants, evaluation of patient's response to treatment, examination of patient, obtaining history from patient or surrogate,  ordering and performing treatments and interventions, ordering and review of laboratory studies, ordering and review of radiographic studies, pulse oximetry, re-evaluation of patient's condition and review of old charts. Critical Care indicators:        PROGRESS, DATA ANALYSIS, CONSULTS, AND MEDICAL DECISION MAKING  All labs have been independently interpreted by me.  All radiology studies have been reviewed by me. All EKG's have been independently viewed and interpreted by me.  Discussion below represents my analysis of pertinent findings related to patient's condition, differential diagnosis, treatment plan and final disposition.    Differential diagnosis includes but is not limited to stroke, TIA, intracranial hemorrhage, seizure.    Clinical Scores:            Total (NIH Stroke Scale): 5      ED Course as of 07/18/24 2223   Thu Jul 18, 2024 1949 Team D process has been activated.  Discussed with Dr. lAlen with stroke neurology. [TR]   2001 CT Angiogram Head w AI Analysis of LVO  My independent interpretation of the imaging study is no acute hemorrhage [TR]   2007 EKG          EKG time: 2006  Rhythm/Rate: Normal sinus, rate 88  P waves and MT: Normal P, normal MT  QRS, axis: Narrow QRS, normal axis  ST and T waves: No acute    Independently Interpreted by me  No prior EKG available for comparison   [TR]   2013 Discussing with Dr. Allen.  Plan TNK. [TR]   2020 Discussed with the patient at the bedside regarding TNK.  He had lots of questions.  He had some hesitation about TNK treatment.  We discussed risks and benefits.  He has now agreed to proceed.  I have given the order to give it. [TR]   2027 Discussing with Dr. Kirkpatrick with pulmonary ICU.  He agrees to admit. [TR]   2040 Discussing with Dr Rea.  There is an increased density in the right hilum and he needs a CT chest. [TR]      ED Course User Index  [TR] Sarabjit Thomas MD             AS OF 22:23 EDT VITALS:    BP - 165/97  HR - 66  TEMP - 98.1 °F (36.7  °C) (Oral)  O2 SATS - 97%    COMPLEXITY OF CARE  The patient requires admission.      DIAGNOSIS  Final diagnoses:   Cerebrovascular accident (CVA), unspecified mechanism         DISPOSITION  ED Disposition       ED Disposition   Decision to Admit    Condition   --    Comment   Level of Care: Critical Care [6]   Diagnosis: Stroke [[id number]]   Admitting Physician: SU EASTON [[id number]]   Attending Physician: SU EASTON [[id number]]   Certification: I Certify That Inpatient Hospital Services Are Medically Necessary For Greater Than 2 Midnights                  Please note that portions of this document were completed with a voice recognition program.    Note Disclaimer: At [facility], we believe that sharing information builds trust and better relationships. You are receiving this note because you recently visited [facility]. It is possible you will see health information before a provider has talked with you about it. This kind of information can be easy to misunderstand. To help you fully understand what it means for your health, we urge you to discuss this note with your provider.         Sarabjit Thomas MD  07/18/24 2028       Sarabjit Thomas MD  07/18/24 2221

## 2024-07-19 NOTE — H&P
"Group: [facility]         History and Physical    Patient Identification:  Roby Ott  68 y.o.  male  1955  [id number]            Requesting physician: Dr Thomas    Reason for Consultation: ICU admission     CC: Acute CVA, s/p TNK    History of Present Illness:  Roby Ott is a 68 year old male who presented to the ED with acute onset LUE, LLE weakness with numbness that started after dinner tonight.  EMS was called immediately, presented to the ED with flaccid LUE and slight drift LLE with tingling and numbness.  NIH 6.  Per wife's advice patient took an ASA prior to arrival \"in case it was my heart.\"  Was noted to be hypertensive upon arrival, /92.  CT head without hemorrhage, Dr Allen with Neurology was consulted in the ED who reviewed images and recommended TNK administration, given at 2025.  At this time radiologist read of CTA head/neck and CTP are still pending to my view.  CXR with area of increased density lateral to the right hilum which could represent focal area of infiltrate but is suspicious for underlying mass measuring approximately 4.5 cm in diameter.  CBC, CMP, coags all WNL.  EKG noted NSR, no acute changes, borderline QT interval. He will be admitted to ICU for further management.    Upon exam in the ED patient denies any symptoms prior to acute onset weakness.  Was in his usual state of health.  No recent illness or hospitalization.  No chest pain, denies any known cardiac or pulmonary issues, does not take any medications.  When questioned if he has a PCP patient states \"it has been a long time since I've been to the doctor.\"  He is a current cigarette smoker, approximately 2ppd since he was 14 years old.  He states he had 1 operation \"a long time ago\" which involved what sounds like a cyst on his neck which was removed.  He states his father had history of skin cancer which he believes was caused by the BP medications he took.       Review of " Systems  CONSTITUTIONAL:  Denies fevers or chills  EYE:  No new vision changes  EAR:  No change in hearing  CARDIAC:  No chest pain  PULMONARY:  No productive cough or shortness of breath  GI:  No diarrhea, hematemesis or hematochezia,  RENAL:  No dysuria or urinary frequency  MUSCULOSKELETAL:  No musculoskeletal complaints  ENDOCRINE:  No heat or cold intolerance  INTEGUMENTARY: No skin rashes  NEUROLOGICAL:  Positive for left sided tingling, weakness  PSYCHIATRIC:  No new anxiety or depression  12 system review of systems performed and all else negative     Past Medical History:  No past medical history on file.    Past Surgical History:  No past surgical history on file.     Home Meds:  (Not in a hospital admission)      Allergies:  No Known Allergies    Social History:   Social History     Socioeconomic History    Marital status:        Family History:  No family history on file.    Physical Exam:  /84   Pulse 85   Temp 99.1 °F (37.3 °C) (Oral)   Resp 18   Wt 83.7 kg (184 lb 8.4 oz)   SpO2 95%  There is no height or weight on file to calculate BMI. 95% 83.7 kg (184 lb 8.4 oz)    Physical Exam  Constitutional: Middle aged pt in bed, no acute distress, on room air  Head: NCAT  Eyes: No pallor, anicteric conjunctiva, EOMI. pupils 3 equal bilaterally, brisk  ENMT:  No oral thrush. Moist MM.   NECK: Trachea midline, no thyromegaly, no JVD  Heart: RRR, no pedal edema, PVCs on tele monitor  Lungs: LUIS +, clear to auscultation throughout, no wheezing or crackles   Abdomen: Soft, nondistended.  No tenderness, guarding or rigidity. No palpable masses  Extremities: Extremities warm and well perfused. No cyanosis/ clubbing.  All pulses palpable.  Neuro: Alert and oriented x 4, answers questions appropriately, can move left hand weakly but unable to move arm, no other weakness upon exam and able to hold LLE up for a count of 10, tingling left facial area, left upper extremity, left lower extremity   Psych:  "Mood and affect appropriate    PPE recommended per [facility] infectious disease Isolation protocol for the current clinical scenario(as mentioned below) was followed.    LABS:  No results found for: \"COVID19\"    Lab Results   Component Value Date    CALCIUM 9.1 07/18/2024     Results from last 7 days   Lab Units 07/18/24 1948   SODIUM mmol/L 139   POTASSIUM mmol/L 3.7   CHLORIDE mmol/L 103   CO2 mmol/L 27.9   BUN mg/dL 9   CREATININE mg/dL 1.03   GLUCOSE mg/dL 94   CALCIUM mg/dL 9.1   WBC 10*3/mm3 10.22   HEMOGLOBIN g/dL 14.0   PLATELETS 10*3/mm3 226   ALT (SGPT) U/L 6   AST (SGOT) U/L 17     Lab Results   Component Value Date    TROPONINT 13 07/18/2024     Results from last 7 days   Lab Units 07/18/24 1948   HSTROP T ng/L 13                     Results from last 7 days   Lab Units 07/18/24 1948   INR  0.98         No results found for: \"TSH\"  CrCl cannot be calculated (Unknown ideal weight.).         Imaging: I personally visualized the images of scans/x-rays performed within last 3 days.      Assessment:  Acute strokelike symptoms, status post TNKase administration  Abnormal CXR, possible right hilar mass  HTN  Tobacco abuse    Recommendations:   Acute stroke-like symptoms  -Admit to ICU, neurology consult  -Neuro checks per protocol  -Nicardipine to keep SBP <180  -Start high dose statin.  A1C, lipid panel in AM  -Repeat CT head AM  -NPO until nursing dysphagia screening, then NPO x sips with meds until neuro sees tomorrow    2.  Abnormal CXR  -CT chest without contrast to further eval abnormal finding on CXR, no infectious s/s    3.  Tobacco abuse  -Tobacco cessation education    SCDs  Full Code  D/w pt and family at bedside    Siobhan Sanon, TAMI  7/18/2024  20:32 EDT      Much of this encounter note is an electronic transcription/translation of spoken language to printed text using Dragon Software.   "

## 2024-07-19 NOTE — CONSULTS
"Neurology Consult Note    Consult Date: 7/19/2024    Referring MD: Sarabjit Thomas MD    Reason for Consult I have been asked to see the patient in neurological consultation to render advice and opinion regarding left-sided weakness    Roby Ott is a 68 y.o. male with past medical history of tobacco abuse, history of lung mass.  Does not visit doctors and does not take any medications at home.  Last night around nighttime 6:50 PM patient started having sudden onset left-sided weakness and numbness so decided to come to the ER.  CT head did not show any acute hypodensity or hypodensity.  The neck did not show any large vessel occlusion so he was given TNK as he was within the window.      History reviewed. No pertinent past medical history.    Exam  /76   Pulse 56   Temp 98.3 °F (36.8 °C) (Oral)   Resp 16   Ht 185.4 cm (73\")   Wt 83.7 kg (184 lb 8.4 oz)   SpO2 92%   BMI 24.35 kg/m²   Gen: NAD, vitals reviewed  MS: oriented x3, normal comprehension repetition and fluency language intact, no neglect.  CN: visual acuity grossly normal, PERRL, EOMI, no facial droop, no dysarthria  Motor: Left upper extremity 3/5 left lower extremity 4 -/5  Equal sensation bilateral upper and lower extremities    DATA:    Lab Results   Component Value Date    GLUCOSE 106 (H) 07/19/2024    CALCIUM 8.8 07/19/2024     07/19/2024    K 3.7 07/19/2024    CO2 26.7 07/19/2024     07/19/2024    BUN 8 07/19/2024    CREATININE 0.93 07/19/2024    BCR 8.6 07/19/2024    ANIONGAP 6.3 07/19/2024     Lab Results   Component Value Date    WBC 11.15 (H) 07/19/2024    HGB 13.8 07/19/2024    HCT 42.1 07/19/2024    MCV 95.9 07/19/2024     07/19/2024       Lab review:   Sodium 139  Creatinine 0.93  Normal LFTs    A1c 6.2      WBC 11.15  hb 13 and platelets 221    Imaging review:   CT head no acute hypodensity or hypodensity, benign lesion on the right parietal bone    CTA head and neck no large vessel occlusion or " stenosis mild athero of the left ICA    Repeat CT head at 7 PM pending    TTE pending    Diagnoses:  Concern for acute ischemic stroke s/p TNK    History of lung mass  Tobacco abuse    Pre-stroke MRS: NIHSS:    Baseline  0-->Alert: keenly responsive  0-->Answers both questions correctly  0-->Performs both tasks correctly  0=normal  0=No visual loss  0=Normal symmetric movement  2-->Some effort against gravity: limb cannot get to or maintain (if cued) 90 (or 45) degrees, drifts down to bed, but has some effort against gravity  0-->No drift: limb holds 90 (or 45) degrees for full 10 secs  1-->Drift: limb holds 90 (or 45) degrees, but drifts down before full 10 seconds: does not hit bed or other support  0-->No drift: limb holds 90 (or 45) degrees for full 10 secs  0=Absent  0=Normal; no sensory loss  0=No aphasia, normal  0=Normal  0=No abnormality    Total score: 3  MRS 0    Plan   -Follow-up post TNK order set  -Systolic blood pressure goal less than 180  -24 hours post TNK pending at 8 PM today, if negative will start aspirin 325 mg daily  -MRI brain with and without  -TTE pending  -Questionable lung mass on chest x-ray pulmonology and critical care physician to address.  Tobacco cessation counseling provided at bedside-    Spoke to patient and patient's family at bedside extensively about the prognosis and treatment plan      MDM   Reviewed: Previous charts, nursing notes and vitals   Reviewed: Previous labs and CT scan    Interpretation: Labs and CT scan   Total time providing critical care is :30-74 minutes. This excluded time spent performing separately reportable procedures and services  Consults :Neurology/Stroke    Please note that portions of this note were completed with a voice recognition program.     Jatin Francis MD  Neuro Hospitalist /Vascular Neurology.

## 2024-07-19 NOTE — ED NOTES
"Nursing report ED to floor  Roby Ott  68 y.o.  male    HPI :  HPI (Adult)  Stated Reason for Visit: Left sided weakness  History Obtained From: EMS    Chief Complaint  Chief Complaint   Patient presents with    Numbness       Admitting doctor:   Naresh Kirkpatrick MD    Admitting diagnosis:   The encounter diagnosis was Cerebrovascular accident (CVA), unspecified mechanism.    Code status:   Current Code Status       Date Active Code Status Order ID Comments User Context       7/18/2024 2030 CPR (Attempt to Resuscitate) [id number]  Naresh Kirkpatrick MD ED        Question Answer    Code Status (Patient has no pulse and is not breathing) CPR (Attempt to Resuscitate)    Medical Interventions (Patient has pulse or is breathing) Full Support                    Allergies:   Patient has no known allergies.    Isolation:   No active isolations    Intake and Output  No intake or output data in the 24 hours ending 07/18/24 2040    Weight:       07/18/24 1945   Weight: 83.7 kg (184 lb 8.4 oz)       Most recent vitals:   Vitals:    07/18/24 1947 07/18/24 2007 07/18/24 2025 07/18/24 2034   BP:  162/84 144/90    BP Location:       Patient Position:       Pulse: 92 85 87    Resp:   18    Temp:       TempSrc:       SpO2:  95% 95%    Weight:       Height:    185.4 cm (73\")       Active LDAs/IV Access:   Lines, Drains & Airways       Active LDAs       Name Placement date Placement time Site Days    Peripheral IV 07/18/24 1931 Anterior;Distal;Right;Upper Arm 07/18/24 1931  Arm  less than 1                    Labs (abnormal labs have a star):   Labs Reviewed   CBC WITH AUTO DIFFERENTIAL - Abnormal; Notable for the following components:       Result Value    Lymphocytes, Absolute 3.14 (*)     Monocytes, Absolute 0.94 (*)     Eosinophils, Absolute 0.41 (*)     All other components within normal limits   PROTIME-INR - Normal   APTT - Normal   SINGLE HS TROPONIN T - Normal    Narrative:     High Sensitive Troponin T Reference " Range:  <14.0 ng/L- Negative Female for AMI  <22.0 ng/L- Negative Male for AMI  >=14 - Abnormal Female indicating possible myocardial injury.  >=22 - Abnormal Male indicating possible myocardial injury.   Clinicians would have to utilize clinical acumen, EKG, Troponin, and serial changes to determine if it is an Acute Myocardial Infarction or myocardial injury due to an underlying chronic condition.        POCT GLUCOSE FINGERSTICK - Normal   POCT GLUCOSE FINGERSTICK - Normal   RAINBOW DRAW    Narrative:     The following orders were created for panel order [place] Draw.  Procedure                               Abnormality         Status                     ---------                               -----------         ------                     Green Top (Gel)[[id number]]                                  Final result               Lavender Top[[id number]]                                     Final result               Gold Top - SST[[id number]]                                   Final result               Light Blue Top[[id number]]                                   Final result                 Please view results for these tests on the individual orders.   COMPREHENSIVE METABOLIC PANEL    Narrative:     GFR Normal >60  Chronic Kidney Disease <60  Kidney Failure <15     POCT GLUCOSE FINGERSTICK   POCT GLUCOSE FINGERSTICK   POCT GLUCOSE FINGERSTICK   POCT GLUCOSE FINGERSTICK   TYPE AND SCREEN   CBC AND DIFFERENTIAL    Narrative:     The following orders were created for panel order CBC & Differential.  Procedure                               Abnormality         Status                     ---------                               -----------         ------                     CBC Auto Differential[[id number]]        Abnormal            Final result                 Please view results for these tests on the individual orders.   GREEN TOP   LAVENDER TOP   GOLD TOP - SST   LIGHT BLUE TOP       EKG:   ECG 12 Lead Stroke Evaluation    Preliminary Result   HEART RATE=88  bpm   RR Opbjdyox=642  ms   AL Snoelvin=048  ms   P Horizontal Axis=3  deg   P Front Axis=71  deg   QRSD Xvpcssxd=482  ms   QT Tkqnnbkt=317  ms   YIlE=309  ms   QRS Axis=-84  deg   T Wave Axis=57  deg   - BORDERLINE ECG -   Sinus rhythm   Left axis deviation   RSR' in V1 or V2, right VCD or RVH   Borderline prolonged QT interval   Date and Time of Study:2024-07-18 20:06:02          Meds given in ED:   Medications   sodium chloride 0.9 % flush 10 mL (has no administration in time range)   sodium chloride 0.9 % flush 10 mL (10 mL Intravenous Given 7/18/24 2025)     Followed by   tenecteplase for stroke (TNKASE) injection 21 mg (21 mg Intravenous Given 7/18/24 2025)     Followed by   sodium chloride 0.9 % flush 10 mL (has no administration in time range)   sodium chloride 0.9 % flush 10 mL (has no administration in time range)   sodium chloride 0.9 % flush 10 mL (has no administration in time range)   sodium chloride 0.9 % infusion 40 mL (has no administration in time range)   aspirin tablet 325 mg (has no administration in time range)     Or   aspirin suppository 300 mg (has no administration in time range)   atorvastatin (LIPITOR) tablet 80 mg (has no administration in time range)   niCARdipine (CARDENE) 25 mg in 250 mL NS infusion kit (has no administration in time range)   iopamidol (ISOVUE-370) 76 % injection 150 mL (150 mL Intravenous Given by Other 7/18/24 2001)       Imaging results:  No radiology results for the last day    Ambulatory status:   - ad neptali.    Social issues:   Social History     Socioeconomic History    Marital status:    Tobacco Use    Smoking status: Every Day     Current packs/day: 2.00     Average packs/day: 2.0 packs/day for 54.5 years (109.1 ttl pk-yrs)     Types: Cigarettes     Start date: 1970   Substance and Sexual Activity    Drug use: Not Currently     Types: Marijuana       Peripheral Neurovascular  LUE Neurovascular Assessment  Temperature  LUE: warm  Color LUE: no discoloration  Sensation LUE: tingling present  RUE Neurovascular Assessment  Temperature RUE: warm  Color RUE: no discoloration  Sensation RUE: no numbness, no tenderness, no tingling  LLE Neurovascular Assessment  Temperature LLE: warm  Color LLE: no discoloration  Sensation LLE: tingling present  RLE Neurovascular Assessment  Temperature RLE: warm  Color RLE: no discoloration  Sensation RLE: no numbness, no tingling    Neuro Cognitive  Pupils  Pupil Size Left: 3 mm  Pupil Size Right: 3 mm  NIH Stroke Scale  Interval: baseline  1a. Level of Consciousness: 0-->Alert, keenly responsive  1b. LOC Questions: 0-->Answers both questions correctly  1c. LOC Commands: 0-->Performs both tasks correctly  2. Best Gaze: 0-->Normal  3. Visual: 0-->No visual loss  4. Facial Palsy: 0-->Normal symmetrical movements  5a. Motor Arm, Left: 3-->No effort against gravity, limb falls  5b. Motor Arm, Right: 0-->No drift, limb holds 90 (or 45) degrees for full 10 secs  6a. Motor Leg, Left: 2-->Some effort against gravity, leg falls to bed by 5 secs, but has some effort against gravity  6b. Motor Leg, Right: 0-->No drift, leg holds 30 degree position for full 5 secs  7. Limb Ataxia: 0-->Absent  8. Sensory: 1-->Mild-to-moderate sensory loss, patient feels pinprick is less sharp or is dull on the affected side, or there is a loss of superficial pain with pinprick, but patient is aware of being touched  9. Best Language: 0-->No aphasia, normal  10. Dysarthria: 0-->Normal  11. Extinction and Inattention (formerly Neglect): 0-->No abnormality  Total (NIH Stroke Scale): 6    Learning       Respiratory       Abdominal Pain       Pain Assessments  Pain (Adult)  (0-10) Pain Rating: Rest: 0    NIH Stroke Scale  NIH Stroke Scale  Interval: baseline  1a. Level of Consciousness: 0-->Alert, keenly responsive  1b. LOC Questions: 0-->Answers both questions correctly  1c. LOC Commands: 0-->Performs both tasks correctly  2. Best  Gaze: 0-->Normal  3. Visual: 0-->No visual loss  4. Facial Palsy: 0-->Normal symmetrical movements  5a. Motor Arm, Left: 3-->No effort against gravity, limb falls  5b. Motor Arm, Right: 0-->No drift, limb holds 90 (or 45) degrees for full 10 secs  6a. Motor Leg, Left: 2-->Some effort against gravity, leg falls to bed by 5 secs, but has some effort against gravity  6b. Motor Leg, Right: 0-->No drift, leg holds 30 degree position for full 5 secs  7. Limb Ataxia: 0-->Absent  8. Sensory: 1-->Mild-to-moderate sensory loss, patient feels pinprick is less sharp or is dull on the affected side, or there is a loss of superficial pain with pinprick, but patient is aware of being touched  9. Best Language: 0-->No aphasia, normal  10. Dysarthria: 0-->Normal  11. Extinction and Inattention (formerly Neglect): 0-->No abnormality  Total (NIH Stroke Scale): 6    Patricia Flaherty RN  07/18/24 20:40 EDT

## 2024-07-19 NOTE — PLAN OF CARE
Patient a arrived to unit post TNK from the ED. Patient's NIH score improved from 6 to 3 overnight. Left lower leg movement is equal to the right and the left upper extremity now is able to resist gravity for a short period of time. Patient's  on his left hand has improved as well.  is now weak. Patient and spouse educated on stroke diease process and plan of care.       Problem: Sensorimotor Impairment (Stroke, Ischemic/Transient Ischemic Attack)  Goal: Improved Sensorimotor Function  7/19/2024 0327 by Mychal Miles RN  Outcome: Ongoing, Progressing  7/19/2024 0326 by Mychal Miles RN  Outcome: Ongoing, Progressing  Intervention: Optimize Range of Motion, Motor Control and Function  Recent Flowsheet Documentation  Taken 7/19/2024 0000 by Mychal Miles RN  Positioning/Transfer Devices:   pillows   in use  Taken 7/18/2024 2130 by Mychal Miles RN  Positioning/Transfer Devices:   pillows   in use  Range of Motion:   ROM (range of motion) performed   active ROM (range of motion) encouraged  Intervention: Optimize Sensory and Perceptual Ability  Recent Flowsheet Documentation  Taken 7/18/2024 2130 by Mychal Miles RN  Pressure Reduction Techniques:   frequent weight shift encouraged   heels elevated off bed   positioned off wounds   pressure points protected   weight shift assistance provided  Pressure Reduction Devices:   positioning supports utilized   pressure-redistributing mattress utilized   Goal Outcome Evaluation:  Plan of Care Reviewed With: patient

## 2024-07-19 NOTE — DOWNTIME EVENT NOTE
The EMR was down for 8.5 hours on 7/19/2024.    I was responsible for completing the paper charting during this time period.     The following information was re-entered into the system by Chikis Flores RN: Intake and output, Orders, and MAR    The following information will remain in the paper chart: [facility], [place], [facility], Assessments 0800, 1200; VS, I/O    Chikis Flores RN  7/19/2024

## 2024-07-20 PROBLEM — R91.8 RIGHT LOWER LOBE LUNG MASS: Status: ACTIVE | Noted: 2024-07-20

## 2024-07-20 LAB
ANION GAP SERPL CALCULATED.3IONS-SCNC: 6.9 MMOL/L (ref 5–15)
BASOPHILS # BLD AUTO: 0.04 10*3/MM3 (ref 0–0.2)
BASOPHILS NFR BLD AUTO: 0.3 % (ref 0–1.5)
BUN SERPL-MCNC: 9 MG/DL (ref 8–23)
BUN/CREAT SERPL: 10.3 (ref 7–25)
CALCIUM SPEC-SCNC: 8.8 MG/DL (ref 8.6–10.5)
CHLORIDE SERPL-SCNC: 104 MMOL/L (ref 98–107)
CO2 SERPL-SCNC: 27.1 MMOL/L (ref 22–29)
CREAT BLDA-MCNC: 1.1 MG/DL (ref 0.6–1.3)
CREAT SERPL-MCNC: 0.87 MG/DL (ref 0.76–1.27)
DEPRECATED RDW RBC AUTO: 44.6 FL (ref 37–54)
EGFRCR SERPLBLD CKD-EPI 2021: 93.4 ML/MIN/1.73
EOSINOPHIL # BLD AUTO: 0.41 10*3/MM3 (ref 0–0.4)
EOSINOPHIL NFR BLD AUTO: 3.3 % (ref 0.3–6.2)
ERYTHROCYTE [DISTWIDTH] IN BLOOD BY AUTOMATED COUNT: 12.9 % (ref 12.3–15.4)
GLUCOSE BLDC GLUCOMTR-MCNC: 102 MG/DL (ref 70–130)
GLUCOSE BLDC GLUCOMTR-MCNC: 170 MG/DL (ref 70–130)
GLUCOSE BLDC GLUCOMTR-MCNC: 185 MG/DL (ref 70–130)
GLUCOSE SERPL-MCNC: 109 MG/DL (ref 65–99)
HCT VFR BLD AUTO: 45.2 % (ref 37.5–51)
HGB BLD-MCNC: 14.8 G/DL (ref 13–17.7)
IMM GRANULOCYTES # BLD AUTO: 0.06 10*3/MM3 (ref 0–0.05)
IMM GRANULOCYTES NFR BLD AUTO: 0.5 % (ref 0–0.5)
LYMPHOCYTES # BLD AUTO: 2.65 10*3/MM3 (ref 0.7–3.1)
LYMPHOCYTES NFR BLD AUTO: 21.6 % (ref 19.6–45.3)
MCH RBC QN AUTO: 31 PG (ref 26.6–33)
MCHC RBC AUTO-ENTMCNC: 32.7 G/DL (ref 31.5–35.7)
MCV RBC AUTO: 94.6 FL (ref 79–97)
MONOCYTES # BLD AUTO: 1.07 10*3/MM3 (ref 0.1–0.9)
MONOCYTES NFR BLD AUTO: 8.7 % (ref 5–12)
NEUTROPHILS NFR BLD AUTO: 65.6 % (ref 42.7–76)
NEUTROPHILS NFR BLD AUTO: 8.05 10*3/MM3 (ref 1.7–7)
NRBC BLD AUTO-RTO: 0 /100 WBC (ref 0–0.2)
PLATELET # BLD AUTO: 229 10*3/MM3 (ref 140–450)
PMV BLD AUTO: 10.1 FL (ref 6–12)
POTASSIUM SERPL-SCNC: 4.4 MMOL/L (ref 3.5–5.2)
QT INTERVAL: 396 MS
QTC INTERVAL: 480 MS
RBC # BLD AUTO: 4.78 10*6/MM3 (ref 4.14–5.8)
SODIUM SERPL-SCNC: 138 MMOL/L (ref 136–145)
WBC NRBC COR # BLD AUTO: 12.28 10*3/MM3 (ref 3.4–10.8)

## 2024-07-20 PROCEDURE — 99232 SBSQ HOSP IP/OBS MODERATE 35: CPT | Performed by: STUDENT IN AN ORGANIZED HEALTH CARE EDUCATION/TRAINING PROGRAM

## 2024-07-20 PROCEDURE — 85025 COMPLETE CBC W/AUTO DIFF WBC: CPT | Performed by: INTERNAL MEDICINE

## 2024-07-20 PROCEDURE — 80048 BASIC METABOLIC PNL TOTAL CA: CPT | Performed by: INTERNAL MEDICINE

## 2024-07-20 PROCEDURE — 82948 REAGENT STRIP/BLOOD GLUCOSE: CPT

## 2024-07-20 RX ADMIN — ATORVASTATIN CALCIUM 80 MG: 80 TABLET, FILM COATED ORAL at 21:00

## 2024-07-20 RX ADMIN — Medication 10 ML: at 21:00

## 2024-07-20 RX ADMIN — ASPIRIN 325 MG: 325 TABLET ORAL at 07:47

## 2024-07-20 RX ADMIN — Medication 10 ML: at 07:48

## 2024-07-20 NOTE — PLAN OF CARE
Goal Outcome Evaluation:   A&OX4. NIH 3 during shift. Patient denies headache, dizziness, or  vision changes. Neuro checks Q4, and downgrade orders to telebed per Dr. Penny MD. Complains of left arm tingling, and left arm is able to hold up but does drift some.  Heart rate does mandy down into the 50s when patient is sleeping.

## 2024-07-20 NOTE — NURSING NOTE
Pt transferred from the icu on room air via bed without any complication. A &O X4. NIHSS 1. Afebrile. Follows command. Vital stable. Wife at bedside. Will keep on monitoring.

## 2024-07-20 NOTE — PROGRESS NOTES
"DOS: 2024  NAME: Roby Ott   : 1955  PCP: Provider, No Known  Chief Complaint   Patient presents with    Numbness       Chief complaint: Left upper extremity weakness    Subjective: She has been seen and evaluated, no acute events overnight MRI did reveal multiple embolic looking strokes and three-vessel territory.  Patient states he feels better and wants to go home.    Objective:  Vital signs: /64   Pulse 70   Temp 97.8 °F (36.6 °C) (Oral)   Resp 19   Ht 185.4 cm (73\")   Wt 83.5 kg (184 lb)   SpO2 94%   BMI 24.28 kg/m²    Gen: NAD, vitals reviewed  MS: oriented x3, normal comprehension repetition and fluency language intact, no neglect.  CN: visual acuity grossly normal, PERRL, EOMI, no facial droop, no dysarthria  Motor: Left upper extremity 3/5 left lower extremity 4 -/5  Right upper right lower extremity 5/5  Equal sensation bilateral upper and lower extremities    Laboratory results:  Lab Results   Component Value Date    GLUCOSE 109 (H) 2024    CALCIUM 8.8 2024     2024    K 4.4 2024    CO2 27.1 2024     2024    BUN 9 2024    CREATININE 0.87 2024    BCR 10.3 2024    ANIONGAP 6.9 2024     Lab Results   Component Value Date    WBC 12.28 (H) 2024    HGB 14.8 2024    HCT 45.2 2024    MCV 94.6 2024     2024     Lab Results   Component Value Date     (H) 2024         Lab 24  0401   HEMOGLOBIN A1C 6.20*        Review of labs:   As mentioned above    Imaging review:   CT head no acute hypodensity or hypodensity, benign lesion on the right parietal bone     CTA head and neck no large vessel occlusion or stenosis mild athero of the left ICA     MRI Brain MRI showed acute diffusion restriction along the right MCA left frontal and also right PCA, just infarct along the right MCA no SWI changes     TTE 80% EF normal LAE no clot no thrombus no PFO   "   Diagnoses:  Embolic strokes s/p TNK     Lung mass on chest x-ray  Tobacco abuse     Pre-stroke MRS: NIHSS:     NIH 2  MRI 0     Plan   -Blood pressure goal can be normalized less than 150  - MRI brain concerning for embolic strokes most likely from underlying hypercoagulable state, which is in turn from underlying undiagnosed carcinoma  -Continue aspirin 325 mg and Lipitor 80 mg daily  -Pulmonology planning to do biopsy on Monday  Tobacco cessation counseling provided at bedside  -Will check D-dimer  -Zio patch on discharge  -Will see me in clinic in 1 to 2 months     I spoke about the plan with patient patient has a poor understanding of what is going on and wants to go home.  I strongly believe he needs to get a biopsy establish care with oncology and pulmonology.     If the cancer diagnosis is certain and if at all he has any other embolic events in the future he will need to be on blood thinner.       MDM   Reviewed: Previous charts, nursing notes and vitals   Reviewed: Previous labs and MRI scan    Interpretation: Labs and MRI scan   Total time providing care is :30-74 minutes. This excluded time spent performing separately reportable procedures and services  Consults :Neurology/Stroke    Please note that portions of this note were completed with a voice recognition program.     Jatin Francis MD  Neuro Hospitalist /Vascular Neurology.

## 2024-07-20 NOTE — PLAN OF CARE
Problem: Adult Inpatient Plan of Care  Goal: Plan of Care Review  Outcome: Ongoing, Progressing  Goal: Patient-Specific Goal (Individualized)  Outcome: Ongoing, Progressing  Goal: Absence of Hospital-Acquired Illness or Injury  Outcome: Ongoing, Progressing  Intervention: Identify and Manage Fall Risk  Recent Flowsheet Documentation  Taken 7/20/2024 1615 by Laurel Frazier RN  Safety Promotion/Fall Prevention:   clutter free environment maintained   safety round/check completed  Taken 7/20/2024 1400 by Laurel Frazier RN  Safety Promotion/Fall Prevention:   clutter free environment maintained   safety round/check completed  Taken 7/20/2024 1200 by Laurel Frazier RN  Safety Promotion/Fall Prevention:   clutter free environment maintained   safety round/check completed  Taken 7/20/2024 1000 by Laurel Frazier RN  Safety Promotion/Fall Prevention:   clutter free environment maintained   safety round/check completed  Intervention: Prevent Skin Injury  Recent Flowsheet Documentation  Taken 7/20/2024 1615 by Laurel Frazier RN  Skin Protection:   tubing/devices free from skin contact   skin-to-device areas padded  Taken 7/20/2024 1200 by Laurel Frazier RN  Body Position: position changed independently  Skin Protection:   tubing/devices free from skin contact   skin-to-device areas padded  Taken 7/20/2024 1000 by Laurel Frazier RN  Body Position:   tilted   right  Intervention: Prevent and Manage VTE (Venous Thromboembolism) Risk  Recent Flowsheet Documentation  Taken 7/20/2024 1615 by Laurel Frazier RN  Activity Management: sitting, edge of bed  VTE Prevention/Management: patient refused intervention  Range of Motion: ROM (range of motion) performed  Taken 7/20/2024 1200 by Laurel Frazier RN  VTE Prevention/Management: patient refused intervention  Range of Motion: ROM (range of motion) performed  Taken 7/20/2024 1000 by Laurel Frazier RN  Activity Management: bedrest  Intervention: Prevent  Infection  Recent Flowsheet Documentation  Taken 7/20/2024 1615 by Laurel Frazier RN  Infection Prevention: single patient room provided  Taken 7/20/2024 1400 by Laurel Frazier RN  Infection Prevention: single patient room provided  Taken 7/20/2024 1200 by Laurel Frazier RN  Infection Prevention: single patient room provided  Taken 7/20/2024 1000 by Laurel Frazier RN  Infection Prevention: single patient room provided  Goal: Optimal Comfort and Wellbeing  Outcome: Ongoing, Progressing  Intervention: Provide Person-Centered Care  Recent Flowsheet Documentation  Taken 7/20/2024 1615 by Laurel Frazier RN  Trust Relationship/Rapport:   care explained   thoughts/feelings acknowledged  Taken 7/20/2024 1200 by Laurel Frazier RN  Trust Relationship/Rapport:   care explained   thoughts/feelings acknowledged  Goal: Readiness for Transition of Care  Outcome: Ongoing, Progressing   Goal Outcome Evaluation:    Pt remain in ccu on room air. A & O x4. NIHSS 1. Schedule for lung biopsy on Monday. BM x1. Vital stable. Ongoing plan of care.

## 2024-07-20 NOTE — CONSULTS
Internal Medicine Consult  INTERNAL MEDICINE   [facility]       Patient Identification:  Name: Roby Ott  Age: 68 y.o.  Sex: male  :  1955  MRN: [id number]                   Primary Care Physician: Provider, No Known                               Requesting physician: Dr. Kirkpatrick  Date of consultation: 2024    Reason for consultation: Continued management of CVA status post TNKase and lung mass out of intensive care unit.    History of Present Illness:   Patient is a 68-year-old male with history of hypertension, chronic smoking who does not use any medication presented to our facility on 2024 with complaints of sudden onset of left arm numbness and tingling less than an hour prior to the emergency room visit.  Upon evaluation patient was noted to have acute left arm weakness and team D was called.  Patient was treated with TNKase and admitted to ICU.  Workup did reveal right hilar mass.  Over the course of the last 48 hours patient is improved significantly in terms of strength of the left upper extremity, speech and understanding.  Today patient is considered stable and improved and okay to be transferred out of ICU with plans to perform workup on his hilar mass in the next few days after his neurological issues are stabilized and impact of recent TNKase treatment and ongoing antiplatelet therapy for acute CVA on invasive procedure for lung mass is reassessed.      Past Medical History:  History reviewed. No pertinent past medical history.  Past Surgical History:  Past Surgical History:   Procedure Laterality Date    SKIN SURGERY      removal of ingrown hairs on face      Home Meds:  No medications prior to admission.     Current Meds:     Current Facility-Administered Medications:     aspirin tablet 325 mg, 325 mg, Oral, Daily, 325 mg at 24 0747 **OR** aspirin suppository 300 mg, 300 mg, Rectal, Daily, Naresh Kirkpatrick MD    atorvastatin (LIPITOR) tablet 80 mg, 80  "mg, Oral, Nightly, Naresh Kirkpatrick MD, 80 mg at 07/19/24 2110    sodium chloride 0.9 % flush 10 mL, 10 mL, Intravenous, PRN, Sarabjit Thomas MD    [COMPLETED] sodium chloride 0.9 % flush 10 mL, 10 mL, Intravenous, Q5 Min, 10 mL at 07/18/24 2025 **FOLLOWED BY** [COMPLETED] tenecteplase for stroke (TNKASE) injection 21 mg, 0.25 mg/kg, Intravenous, Once, 21 mg at 07/18/24 2025 **FOLLOWED BY** sodium chloride 0.9 % flush 10 mL, 10 mL, Intravenous, Once, Sarabjit Thomas MD    sodium chloride 0.9 % flush 10 mL, 10 mL, Intravenous, Q12H, Naresh Kirkpatrick MD, 10 mL at 07/20/24 0748    sodium chloride 0.9 % flush 10 mL, 10 mL, Intravenous, PRN, Naresh Kirkpatrick MD    sodium chloride 0.9 % infusion 40 mL, 40 mL, Intravenous, PRN, Naresh Kirkpatrick MD  Allergies:  No Known Allergies  Social History:   Social History     Tobacco Use    Smoking status: Every Day     Current packs/day: 2.00     Average packs/day: 2.0 packs/day for 54.5 years (109.1 ttl pk-yrs)     Types: Cigarettes     Start date: 1970    Smokeless tobacco: Not on file   Substance Use Topics    Alcohol use: Not Currently      Family History:  History reviewed. No pertinent family history.       Review of Systems  See history of present illness and past medical history.  As described in history presenting illness currently feeling better with improved strength in his left arm and no changes in speech.      Vitals:   /73 (BP Location: Left arm, Patient Position: Lying)   Pulse 66   Temp 97.2 °F (36.2 °C) (Oral)   Resp 18   Ht 185.4 cm (73\")   Wt 83.5 kg (184 lb)   SpO2 94%   BMI 24.28 kg/m²   I/O:   Intake/Output Summary (Last 24 hours) at 7/20/2024 1807  Last data filed at 7/20/2024 1644  Gross per 24 hour   Intake 440 ml   Output 1290 ml   Net -850 ml     Exam:  Patient is examined using the personal protective equipment as per guidelines from infection control for this particular patient as enacted.  Hand washing was performed before and after " patient interaction.  General Appearance:    Alert, cooperative, no distress, appears stated age   Head:    Normocephalic, without obvious abnormality, atraumatic   Eyes:    PERRL, conjunctiva/corneas clear, EOM's intact, both eyes   Ears:    Normal external ear canals, both ears   Nose:   Nares normal, septum midline, mucosa normal, no drainage    or sinus tenderness   Throat:   Lips, tongue, gums normal; oral mucosa pink and moist   Neck:   Supple, symmetrical, trachea midline, no adenopathy;     thyroid:  no enlargement/tenderness/nodules; no carotid    bruit or JVD   Back:     Symmetric, no curvature, ROM normal, no CVA tenderness   Lungs:     Clear to auscultation bilaterally, respirations unlabored   Chest Wall:    No tenderness or deformity    Heart:  S1-S2 regular   Abdomen:   Soft nontender   Extremities:   Extremities normal, atraumatic, no cyanosis or edema   Pulses:   Pulses palpable in all extremities; symmetric all extremities   Skin:   Skin color normal, Skin is warm and dry,  no rashes or palpable lesions   Neurologic: Improved strength in left upper extremity with preserved speech and no evidence of expressive or receptive aphasia.       Data Review:      I reviewed the patient's new clinical results.  Results from last 7 days   Lab Units 07/20/24  0429 07/19/24  0401 07/18/24 1948   WBC 10*3/mm3 12.28* 11.15* 10.22   HEMOGLOBIN g/dL 14.8 13.8 14.0   PLATELETS 10*3/mm3 229 221 226     Results from last 7 days   Lab Units 07/20/24  0429 07/19/24  0401 07/18/24 1951 07/18/24  1948   SODIUM mmol/L 138 139  --  139   POTASSIUM mmol/L 4.4 3.7  --  3.7   CHLORIDE mmol/L 104 106  --  103   CO2 mmol/L 27.1 26.7  --  27.9   BUN mg/dL 9 8  --  9   CREATININE mg/dL 0.87 0.93 1.10 1.03   CALCIUM mg/dL 8.8 8.8  --  9.1   GLUCOSE mg/dL 109* 106*  --  94     MRI Brain With & Without Contrast    Result Date: 7/20/2024  1. There are clusters of small acute infarcts involving the posterior superior right frontal  and anterior superior right parietal cortex involving both the pre- and postcentral gyri. The combined dimensions of the clustered small superior right frontal parietal cortical infarcts measures up to 2.7 x 1.7 x 2 cm in size and it is in the right MCA territory. Furthermore, there is an additional cluster of 5 separate 2 to 9 mm acute infarcts involving the superior lateral left frontal cortex in the left MCA territory and there is a 5 mm nodular acute infarct involving the mid-to-posterior superior right occipital cortex in the right PCA territory and the different vascular territories of these acute infarcts suggests the patient may have a central or cardiac embolic source.  2. There is mild small vessel disease in cerebral white matter and 7 x 3 mm old posterior medial right cerebellar infarct in right PICA territory, 5 mm old lacunar infarct in the head of the left caudate nucleus.  3. Reidentified is an indeterminate T1 hypointense, T2 hyperintense enhancing calvarial lesion expanding the superior right parietal bone. It maximally measures 4 x 3.6 cm in mediolateral and anterior posterior dimension, 13 mm in craniocaudal dimension, extends from the outer medullary cavity and expands and remodels the external table of the superior right parietal bone. Its matrix on recent CT yesterday, on 07/18/2024 suggests that it is probably benign. 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 I think is unlikely to be an osseous met but an osseous metastasis is not entirely excluded. A bone scan could be used to further evaluate the remainder of the skeleton and this lesion.. The remainder the MRI of the brain is normal. The results were discussed with Dr. Francis from Stroke Neurology by telephone on 07/19/2024 at 9 p.m.  This report was finalized on 7/20/2024 8:52 AM by Dr. Daniel Sandoval M.D on Workstation: [id number]      CT Angiogram Head w AI Analysis of LVO    Result Date: 7/19/2024  There is no evidence of acute  infarction, intracranial hemorrhage or of hydrocephalus. Further evaluation could be performed with a MRI examination of the brain with and without contrast. A benign-appearing calcified lesion noted involving the right parietal bone superolaterally measuring approximately 3 x 4 x 1 cm in size with benign characteristics. Although nonspecific, this likely represents a calcified hematoma. Clinical correlation suggested. There is atherosclerotic disease appreciated involving the carotid bifurcations bilaterally with 0% stenosis on the right and 10 to 15% stenosis on the left. There is mild stenosis involving the origin of the left common carotid artery. There is no evidence of proximal intracranial arterial high-grade stenosis or occlusion.   AI analysis of LVO was utilized.  Radiation dose reduction techniques were utilized, including automated exposure control and exposure modulation based on body size.   This report was finalized on 7/19/2024 5:01 PM by Dr. Raymond Rea M.D on Workstation: [id number]      CT Angiogram Neck    Result Date: 7/19/2024  There is no evidence of acute infarction, intracranial hemorrhage or of hydrocephalus. Further evaluation could be performed with a MRI examination of the brain with and without contrast. A benign-appearing calcified lesion noted involving the right parietal bone superolaterally measuring approximately 3 x 4 x 1 cm in size with benign characteristics. Although nonspecific, this likely represents a calcified hematoma. Clinical correlation suggested. There is atherosclerotic disease appreciated involving the carotid bifurcations bilaterally with 0% stenosis on the right and 10 to 15% stenosis on the left. There is mild stenosis involving the origin of the left common carotid artery. There is no evidence of proximal intracranial arterial high-grade stenosis or occlusion.   AI analysis of LVO was utilized.  Radiation dose reduction techniques were utilized, including  automated exposure control and exposure modulation based on body size.   This report was finalized on 7/19/2024 5:01 PM by Dr. Raymond Rea M.D on Workstation: [id number]      CT CEREBRAL PERFUSION WITH & WITHOUT CONTRAST    Result Date: 7/19/2024  There is no evidence of acute infarction, intracranial hemorrhage or of hydrocephalus. Further evaluation could be performed with a MRI examination of the brain with and without contrast. A benign-appearing calcified lesion noted involving the right parietal bone superolaterally measuring approximately 3 x 4 x 1 cm in size with benign characteristics. Although nonspecific, this likely represents a calcified hematoma. Clinical correlation suggested. There is atherosclerotic disease appreciated involving the carotid bifurcations bilaterally with 0% stenosis on the right and 10 to 15% stenosis on the left. There is mild stenosis involving the origin of the left common carotid artery. There is no evidence of proximal intracranial arterial high-grade stenosis or occlusion.   AI analysis of LVO was utilized.  Radiation dose reduction techniques were utilized, including automated exposure control and exposure modulation based on body size.   This report was finalized on 7/19/2024 5:01 PM by Dr. Raymond Rea M.D on Workstation: [id number]      CT Chest Without Contrast Diagnostic    Result Date: 7/18/2024   1. Multilobulated 38 mm x 32 mm mass in the right lower lobe, probable primary lung malignancy. 2. Additional sub-6 mm pulmonary nodules in each lower lobe, which are nonspecific but metastatic disease is not excluded. 3. Scattered groundglass opacities in both lungs, likely infectious/inflammatory. 4. Moderate calcified coronary artery disease. 5. Large 5 cm x 3 cm cystic mass in the subcutaneous fat immediately deep to the skin of the posterior upper right chest wall, possible benign epidermal inclusion cyst.  This report was finalized on 7/18/2024 9:45 PM by Josué  MD Selene on Workstation: [id number]     Microbiology Results (last 10 days)       ** No results found for the last 240 hours. **          ECG 12 Lead Stroke Evaluation   Preliminary Result   HEART RATE=88  bpm   RR Vbkrqhov=779  ms   IN Reqqtkza=905  ms   P Horizontal Axis=3  deg   P Front Axis=71  deg   QRSD Ovasrxbw=667  ms   QT Cycrjjag=558  ms   JGfK=525  ms   QRS Axis=-84  deg   T Wave Axis=57  deg   - BORDERLINE ECG -   Sinus rhythm   Left axis deviation   RSR' in V1 or V2, right VCD or RVH   Borderline prolonged QT interval   Date and Time of Study:2024-07-18 20:06:02      Telemetry Scan   Final Result          Assessment:  Active Hospital Problems    Diagnosis  POA    **Stroke [I63.9] with multiple infarct in precentral postcentral gyrus in the right MCA distribution status post TNKase on 7/18/2024  Yes    Right lower lobe lung mass [R91.8]  Unknown   History of smoking    Medical decision making/CARE plan:  Continue present management and workup for stroke as per neurology service.  Monitor his blood pressure currently does not require any specific antihypertensive regimen given his blood pressure pattern  Smoking cessation counseling  OT PT evaluation  Eventual workup for lung mass under the guidance of pulmonary service.  [facility] will assume care once pressure is transferred ICU or downgraded to telemetry unit.    Omar Molina MD   7/20/2024  18:07 EDT    Parts of this note may be an electronic transcription/translation of spoken language to printed text using the Dragon dictation system.

## 2024-07-20 NOTE — PROGRESS NOTES
"Dr. OSCAR Kirkpatrick    [facility] CORONARY CARE        Patient ID:  Name:  Roby Ott  MRN:  [id number]  1955  68 y.o.  male            CC/Reason for visit: Acute stroke    Interval hx: Left arm weakness is much improved.  Denies speech problems, double vision or headache.      ROS: No chest pain, no abdominal pain    Vitals:  Vitals:    07/20/24 0951 07/20/24 1000 07/20/24 1100 07/20/24 1132   BP: 97/54 112/61 117/64    BP Location: Left arm      Patient Position: Lying      Pulse:  62 70    Resp: 18   19   Temp: 98.2 °F (36.8 °C)   97.8 °F (36.6 °C)   TempSrc: Oral   Oral   SpO2:  100% 94%    Weight:       Height:               Body mass index is 24.28 kg/m².    Intake/Output Summary (Last 24 hours) at 7/20/2024 1143  Last data filed at 7/20/2024 1100  Gross per 24 hour   Intake 790 ml   Output 1700 ml   Net -910 ml       Exam:  GEN:  No distress  Alert, oriented x 4, fluent speech, moves all 4 extremities without focal deficits throughout  LUNGS: Clear breath sounds bilat, no use of accessory muscles  CV:  Normal S1S2, without murmur, no edema  ABD:  Non tender, no enlarged liver or masses      Scheduled meds:  aspirin, 325 mg, Oral, Daily   Or  aspirin, 300 mg, Rectal, Daily  atorvastatin, 80 mg, Oral, Nightly  sodium chloride, 10 mL, Intravenous, Once  sodium chloride, 10 mL, Intravenous, Q12H      IV meds:                      niCARdipine, 5-15 mg/hr        Data Review:   I reviewed the patient's medications and new clinical results.    No results found for: \"COVID19\"        Results from last 7 days   Lab Units 07/20/24  0429 07/19/24  0401 07/18/24 1951 07/18/24  1948   SODIUM mmol/L 138 139  --  139   POTASSIUM mmol/L 4.4 3.7  --  3.7   CHLORIDE mmol/L 104 106  --  103   CO2 mmol/L 27.1 26.7  --  27.9   BUN mg/dL 9 8  --  9   CREATININE mg/dL 0.87 0.93 1.10 1.03   CALCIUM mg/dL 8.8 8.8  --  9.1   BILIRUBIN mg/dL  --   --   --  0.3   ALK PHOS U/L  --   --   --  113   ALT (SGPT) U/L  --   --   " --  6   AST (SGOT) U/L  --   --   --  17   GLUCOSE mg/dL 109* 106*  --  94   WBC 10*3/mm3 12.28* 11.15*  --  10.22   HEMOGLOBIN g/dL 14.8 13.8  --  14.0   PLATELETS 10*3/mm3 229 221  --  226   INR   --   --   --  0.98            Visualized images CT chest showing 3.8 cm right lower lobe mass    ASSESSMENT:     Stroke with left sided weakness  Status post TNKase  Right lower lobe lung mass  Hypertension  Tobacco abuse        PLAN:  Neurologically much improved.  Did respond well to TNKase given 2 days ago.  Neurology following along, conducting stroke evaluation.  Has been weaned off of Cardene.  Advance oral agents for blood pressure control.  Continue oral statin and aspirin for stroke.  Will need lung biopsy eventually, perhaps next week.  Will be done transthoracic, percutaneous CT-guided since that is the most viable option.  Unfortunately this looks like lung cancer.  Consult hospitalist service to take over        Naresh Kirkpatrick MD  7/20/2024

## 2024-07-21 PROBLEM — Z72.0 TOBACCO ABUSE: Status: ACTIVE | Noted: 2024-07-21

## 2024-07-21 PROBLEM — R73.03 BORDERLINE TYPE 2 DIABETES MELLITUS: Status: ACTIVE | Noted: 2024-07-21

## 2024-07-21 LAB
ANION GAP SERPL CALCULATED.3IONS-SCNC: 10.3 MMOL/L (ref 5–15)
BASOPHILS # BLD AUTO: 0.06 10*3/MM3 (ref 0–0.2)
BASOPHILS NFR BLD AUTO: 0.5 % (ref 0–1.5)
BUN SERPL-MCNC: 11 MG/DL (ref 8–23)
BUN/CREAT SERPL: 12.8 (ref 7–25)
CALCIUM SPEC-SCNC: 8.3 MG/DL (ref 8.6–10.5)
CHLORIDE SERPL-SCNC: 104 MMOL/L (ref 98–107)
CO2 SERPL-SCNC: 20.7 MMOL/L (ref 22–29)
CREAT SERPL-MCNC: 0.86 MG/DL (ref 0.76–1.27)
D DIMER PPP FEU-MCNC: 0.83 MCGFEU/ML (ref 0–0.69)
DEPRECATED RDW RBC AUTO: 43.8 FL (ref 37–54)
EGFRCR SERPLBLD CKD-EPI 2021: 93.7 ML/MIN/1.73
EOSINOPHIL # BLD AUTO: 0.4 10*3/MM3 (ref 0–0.4)
EOSINOPHIL NFR BLD AUTO: 3.5 % (ref 0.3–6.2)
ERYTHROCYTE [DISTWIDTH] IN BLOOD BY AUTOMATED COUNT: 12.7 % (ref 12.3–15.4)
GLUCOSE BLDC GLUCOMTR-MCNC: 111 MG/DL (ref 70–130)
GLUCOSE SERPL-MCNC: 92 MG/DL (ref 65–99)
HCT VFR BLD AUTO: 41.7 % (ref 37.5–51)
HGB BLD-MCNC: 13.9 G/DL (ref 13–17.7)
IMM GRANULOCYTES # BLD AUTO: 0.04 10*3/MM3 (ref 0–0.05)
IMM GRANULOCYTES NFR BLD AUTO: 0.3 % (ref 0–0.5)
INR PPP: 1.1 (ref 0.9–1.1)
LYMPHOCYTES # BLD AUTO: 2.91 10*3/MM3 (ref 0.7–3.1)
LYMPHOCYTES NFR BLD AUTO: 25.1 % (ref 19.6–45.3)
MCH RBC QN AUTO: 31.4 PG (ref 26.6–33)
MCHC RBC AUTO-ENTMCNC: 33.3 G/DL (ref 31.5–35.7)
MCV RBC AUTO: 94.1 FL (ref 79–97)
MONOCYTES # BLD AUTO: 1.12 10*3/MM3 (ref 0.1–0.9)
MONOCYTES NFR BLD AUTO: 9.7 % (ref 5–12)
NEUTROPHILS NFR BLD AUTO: 60.9 % (ref 42.7–76)
NEUTROPHILS NFR BLD AUTO: 7.05 10*3/MM3 (ref 1.7–7)
NRBC BLD AUTO-RTO: 0 /100 WBC (ref 0–0.2)
PLATELET # BLD AUTO: 214 10*3/MM3 (ref 140–450)
PMV BLD AUTO: 10.7 FL (ref 6–12)
POTASSIUM SERPL-SCNC: 4 MMOL/L (ref 3.5–5.2)
PROTHROMBIN TIME: 14.4 SECONDS (ref 11.7–14.2)
RBC # BLD AUTO: 4.43 10*6/MM3 (ref 4.14–5.8)
SODIUM SERPL-SCNC: 135 MMOL/L (ref 136–145)
WBC NRBC COR # BLD AUTO: 11.58 10*3/MM3 (ref 3.4–10.8)

## 2024-07-21 PROCEDURE — 85610 PROTHROMBIN TIME: CPT | Performed by: INTERNAL MEDICINE

## 2024-07-21 PROCEDURE — 85379 FIBRIN DEGRADATION QUANT: CPT | Performed by: STUDENT IN AN ORGANIZED HEALTH CARE EDUCATION/TRAINING PROGRAM

## 2024-07-21 PROCEDURE — 99232 SBSQ HOSP IP/OBS MODERATE 35: CPT | Performed by: STUDENT IN AN ORGANIZED HEALTH CARE EDUCATION/TRAINING PROGRAM

## 2024-07-21 PROCEDURE — 82948 REAGENT STRIP/BLOOD GLUCOSE: CPT

## 2024-07-21 PROCEDURE — 85025 COMPLETE CBC W/AUTO DIFF WBC: CPT | Performed by: INTERNAL MEDICINE

## 2024-07-21 PROCEDURE — 80048 BASIC METABOLIC PNL TOTAL CA: CPT | Performed by: INTERNAL MEDICINE

## 2024-07-21 RX ADMIN — Medication 10 ML: at 08:32

## 2024-07-21 RX ADMIN — Medication 10 ML: at 22:20

## 2024-07-21 RX ADMIN — ASPIRIN 325 MG: 325 TABLET ORAL at 08:32

## 2024-07-21 RX ADMIN — ATORVASTATIN CALCIUM 80 MG: 80 TABLET, FILM COATED ORAL at 22:20

## 2024-07-21 NOTE — PROGRESS NOTES
Name: Roby Ott ADMIT: 2024   : 1955  PCP: Provider, No Known    MRN: [id number] LOS: 3 days   AGE/SEX: 68 y.o. male  ROOM: [facility]     Subjective   Subjective   Pt feeling better since admission. Still weak on left side. No swallowing issues. No HA or vis changes. No speech issues. Voiding well. Tolerating diet. No N/V/D/abd pain. No F/C/NS. No SOA or CP.       Objective   Objective   Vital Signs  Temp:  [97.2 °F (36.2 °C)-98.3 °F (36.8 °C)] 98.3 °F (36.8 °C)  Heart Rate:  [57-61] 61  Resp:  [13-18] 16  BP: (104-132)/(52-73) 132/59  SpO2:  [93 %-96 %] 96 %  on   ;   Device (Oxygen Therapy): room air  Body mass index is 24.28 kg/m².  Physical Exam  Vitals and nursing note reviewed. Exam conducted with a chaperone present (Wife).   Constitutional:       General: He is not in acute distress.     Appearance: He is ill-appearing (chronically). He is not toxic-appearing or diaphoretic.   HENT:      Head: Normocephalic.      Nose: Nose normal.      Mouth/Throat:      Mouth: Mucous membranes are moist.      Pharynx: Oropharynx is clear.   Eyes:      General: No scleral icterus.        Right eye: No discharge.         Left eye: No discharge.      Extraocular Movements: Extraocular movements intact.      Conjunctiva/sclera: Conjunctivae normal.   Cardiovascular:      Rate and Rhythm: Normal rate and regular rhythm.      Pulses: Normal pulses.   Pulmonary:      Effort: Pulmonary effort is normal. No respiratory distress.      Breath sounds: Normal breath sounds. No wheezing or rales.   Abdominal:      General: Bowel sounds are normal. There is no distension.      Palpations: Abdomen is soft.      Tenderness: There is no abdominal tenderness.   Musculoskeletal:         General: No swelling. Normal range of motion.      Cervical back: Neck supple.   Skin:     General: Skin is warm and dry.      Capillary Refill: Capillary refill takes less than 2 seconds.      Coloration: Skin is not jaundiced.    Neurological:      Mental Status: He is alert and oriented to person, place, and time.      Cranial Nerves: No cranial nerve deficit.      Motor: Weakness (left-sided) present.      Coordination: Coordination abnormal (dysmetria of LUE).   Psychiatric:         Mood and Affect: Mood normal.         Behavior: Behavior normal.       Results Review     I reviewed the patient's new clinical results.  Results from last 7 days   Lab Units 07/21/24  0502 07/20/24 0429 07/19/24 0401 07/18/24 1948   WBC 10*3/mm3 11.58* 12.28* 11.15* 10.22   HEMOGLOBIN g/dL 13.9 14.8 13.8 14.0   PLATELETS 10*3/mm3 214 229 221 226     Results from last 7 days   Lab Units 07/21/24  0502 07/20/24 0429 07/19/24 0401 07/18/24 1951 07/18/24 1948   SODIUM mmol/L 135* 138 139  --  139   POTASSIUM mmol/L 4.0 4.4 3.7  --  3.7   CHLORIDE mmol/L 104 104 106  --  103   CO2 mmol/L 20.7* 27.1 26.7  --  27.9   BUN mg/dL 11 9 8  --  9   CREATININE mg/dL 0.86 0.87 0.93 1.10 1.03   GLUCOSE mg/dL 92 109* 106*  --  94   EGFR mL/min/1.73 93.7 93.4 88.9  --  78.6     Results from last 7 days   Lab Units 07/18/24 1948   ALBUMIN g/dL 3.9   BILIRUBIN mg/dL 0.3   ALK PHOS U/L 113   AST (SGOT) U/L 17   ALT (SGPT) U/L 6     Results from last 7 days   Lab Units 07/21/24  0502 07/20/24  0429 07/19/24 0401 07/18/24 1948   CALCIUM mg/dL 8.3* 8.8 8.8 9.1   ALBUMIN g/dL  --   --   --  3.9       Hemoglobin A1C   Date/Time Value Ref Range Status   07/19/2024 0401 6.20 (H) 4.80 - 5.60 % Final     Glucose   Date/Time Value Ref Range Status   07/21/2024 0107 111 70 - 130 mg/dL Final   07/20/2024 1758 185 (H) 70 - 130 mg/dL Final   07/20/2024 0955 170 (H) 70 - 130 mg/dL Final   07/20/2024 0723 102 70 - 130 mg/dL Final   07/19/2024 1558 102 70 - 130 mg/dL Final   07/19/2024 1124 138 (H) 70 - 130 mg/dL Final   07/19/2024 0739 103 70 - 130 mg/dL Final       MRI Brain With & Without Contrast    Result Date: 7/20/2024  1. There are clusters of small acute infarcts involving  the posterior superior right frontal and anterior superior right parietal cortex involving both the pre- and postcentral gyri. The combined dimensions of the clustered small superior right frontal parietal cortical infarcts measures up to 2.7 x 1.7 x 2 cm in size and it is in the right MCA territory. Furthermore, there is an additional cluster of 5 separate 2 to 9 mm acute infarcts involving the superior lateral left frontal cortex in the left MCA territory and there is a 5 mm nodular acute infarct involving the mid-to-posterior superior right occipital cortex in the right PCA territory and the different vascular territories of these acute infarcts suggests the patient may have a central or cardiac embolic source.  2. There is mild small vessel disease in cerebral white matter and 7 x 3 mm old posterior medial right cerebellar infarct in right PICA territory, 5 mm old lacunar infarct in the head of the left caudate nucleus.  3. Reidentified is an indeterminate T1 hypointense, T2 hyperintense enhancing calvarial lesion expanding the superior right parietal bone. It maximally measures 4 x 3.6 cm in mediolateral and anterior posterior dimension, 13 mm in craniocaudal dimension, extends from the outer medullary cavity and expands and remodels the external table of the superior right parietal bone. Its matrix on recent CT yesterday, on 07/18/2024 suggests that it is probably benign. 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 I think is unlikely to be an osseous met but an osseous metastasis is not entirely excluded. A bone scan could be used to further evaluate the remainder of the skeleton and this lesion.. The remainder the MRI of the brain is normal. The results were discussed with Dr. Francis from Stroke Neurology by telephone on 07/19/2024 at 9 p.m.  This report was finalized on 7/20/2024 8:52 AM by Dr. Daniel Sandoval M.D on Workstation: [id number]       I have personally reviewed all medications:  Scheduled  Medications  aspirin, 325 mg, Oral, Daily   Or  aspirin, 300 mg, Rectal, Daily  atorvastatin, 80 mg, Oral, Nightly  sodium chloride, 10 mL, Intravenous, Once  sodium chloride, 10 mL, Intravenous, Q12H    Infusions   Diet  Diet: Cardiac; Healthy Heart (2-3 Na+); Texture: Mechanical Ground (NDD 2); Fluid Consistency: Thin (IDDSI 0)    I have personally reviewed:  [x]  Laboratory   [x]  Microbiology   [x]  Radiology   [x]  EKG/Telemetry  [x]  Cardiology/Vascular   []  Pathology    [x]  Records       Assessment/Plan     Active Hospital Problems    Diagnosis  POA    **Stroke [I63.9]  Yes    Borderline type 2 diabetes mellitus [R73.03]  Yes    Tobacco abuse [Z72.0]  Yes    Right lower lobe lung mass [R91.8]  Yes      Resolved Hospital Problems   No resolved problems to display.       69yo smoker w/o significant medical history who presented to ER via EMS with left-sided weakness and was treated with TNK. He was admitted to ICU and we were asked to assume care when he transferred out. He was also noted to have a new RLL lung mass concerning for primary lung CA.    Bilateral acute CVAs (R>L)  S/p TNK in ER  Left-sided weakness improving  Neuro recommends ASA 325mg and Lipitor 80mg  Consider AC if he has any further embolic events--at high risk given new cancer diagnosis (see below)  BPs at goal  Tobacco cessation  ZioPatch at MA  Still waiting on PT/OT to see pt  F/u with Neuro in 1 month (Penny)    RLL lung mass  CT-guided needle biopsy tomorrow  Home after--pt wanted to leave today but Pulm was able to convince him to stay for biopsy  Will need PA&lat CXR after biopsy    Elevated DDimer  Mildly elevated at 0.83  Defer to Pulm and Neuro    Hyperglycemia/Pre-diabetes  A1c 6.2  Sugars acceptable  Will need to f/u with PCP for monitoring    Tobacco abuse  Recommend cessation      SCDs for DVT prophylaxis.  Full code.  Discussed with patient, wife, RN, Dr. Kirkpatrick, and Dr. Francis.  Anticipate discharge home with family   tomorrow after biopsy.  Expected Discharge Date: 7/22/2024; Expected Discharge Time:       Umberto Wright MD  [facility]  07/21/24  14:06 EDT

## 2024-07-21 NOTE — PLAN OF CARE
Goal Outcome Evaluation:              Outcome Evaluation: Patient a/o x4, cooperative with care. Wife at bedside, supportive of patient. All meds given as ordered. No new issues noted. See v/s and labs.

## 2024-07-21 NOTE — PROGRESS NOTES
"DOS: 2024  NAME: Roby Ott   : 1955  PCP: Provider, No Known  Chief Complaint   Patient presents with    Numbness       Chief complaint: Left-sided weakness     Subjective: Patient has been seen and evaluated, no acute events overnight.  Spoke about the MRI results biopsy and the stroke plan and prognosis from here both with the wife and patient.    Objective:  Vital signs: /55 (BP Location: Left arm, Patient Position: Lying)   Pulse 61   Temp 98.1 °F (36.7 °C) (Oral)   Resp 13   Ht 185.4 cm (73\")   Wt 83.5 kg (184 lb)   SpO2 96%   BMI 24.28 kg/m²    Gen: NAD, vitals reviewed  MS: oriented x3, recent/remote memory intact, normal attention/concentration, language intact, no neglect.  CN: visual acuity grossly normal, PERRL, EOMI, no facial droop, no dysarthria  Motor: Left upper extremity strength improving its now 4 -/5  Left lower extremity 4 -/5  Right upper and right lower 5/5  Sensory: intact to light touch all 4 ext.    Laboratory results:  Lab Results   Component Value Date    GLUCOSE 92 2024    CALCIUM 8.3 (L) 2024     (L) 2024    K 4.0 2024    CO2 20.7 (L) 2024     2024    BUN 11 2024    CREATININE 0.86 2024    BCR 12.8 2024    ANIONGAP 10.3 2024     Lab Results   Component Value Date    WBC 11.58 (H) 2024    HGB 13.9 2024    HCT 41.7 2024    MCV 94.1 2024     2024     Lab Results   Component Value Date     (H) 2024         Lab 24  0401   HEMOGLOBIN A1C 6.20*        Review of labs:   Sodium 135  Creatinine 0.86  Normal LFT    A1c 6.2      WBC 11.5  Hemoglobin 13 and platelets 214    Imaging review:   CT head no acute hypodensity or hypodensity, benign lesion on the right parietal bone     CTA head and neck no large vessel occlusion or stenosis mild athero of the left ICA     MRI Brain MRI showed acute diffusion restriction along the right MCA " left frontal and also right PCA, just infarct along the right MCA no SWI changes     TTE 80% EF normal LAE no clot no thrombus no PFO     Diagnoses:  Embolic strokes s/p TNK     Lung mass on chest x-ray  Tobacco abuse     Pre-stroke MRS: NIHSS:     NIH 2  MRI 0     Plan   -Blood pressure goal can be normalized less than 150  - MRI brain concerning for embolic strokes most likely from underlying hypercoagulable state, which is in turn from underlying undiagnosed carcinoma  -Continue aspirin 325 mg and Lipitor 80 mg daily  -Pulmonology planning to do biopsy on Monday  Tobacco cessation counseling provided at bedside  -Will check D-dimer pending  -Zio patch on discharge  -Will see me in clinic in 1-2 months     I spoke about the plan with patient and his wife , patient has a poor understanding of what is going on and wants to go home.  I strongly believe he needs to get a biopsy establish care with oncology and pulmonology.      If the cancer diagnosis is certain and if at all he has any other embolic events in the future he will need to be on blood thinner.     Neurology will sign off, made a clinic appointment for him in 1 month. He will see me in clinic.       MDM   Reviewed: Previous charts, nursing notes and vitals   Reviewed: Previous labs    Interpretation: Labs   Total time providing care is :30-74 minutes. This excluded time spent performing separately reportable procedures and services  Consults :Neurology/Stroke    Please note that portions of this note were completed with a voice recognition program.     Jatin Francis MD  Neuro Hospitalist /Vascular Neurology.

## 2024-07-21 NOTE — PLAN OF CARE
Problem: Adult Inpatient Plan of Care  Goal: Plan of Care Review  Outcome: Ongoing, Progressing  Goal: Patient-Specific Goal (Individualized)  Outcome: Ongoing, Progressing  Goal: Absence of Hospital-Acquired Illness or Injury  Outcome: Ongoing, Progressing  Intervention: Identify and Manage Fall Risk  Recent Flowsheet Documentation  Taken 7/21/2024 1800 by Laurel Frazeir RN  Safety Promotion/Fall Prevention:   clutter free environment maintained   safety round/check completed  Taken 7/21/2024 1600 by Laurel Frazier RN  Safety Promotion/Fall Prevention:   clutter free environment maintained   safety round/check completed  Taken 7/21/2024 1200 by Laurel Frazier RN  Safety Promotion/Fall Prevention:   clutter free environment maintained   safety round/check completed  Taken 7/21/2024 1000 by Laurel Frazier RN  Safety Promotion/Fall Prevention:   clutter free environment maintained   safety round/check completed  Taken 7/21/2024 0815 by Laurel Frazier RN  Safety Promotion/Fall Prevention:   clutter free environment maintained   safety round/check completed  Intervention: Prevent Skin Injury  Recent Flowsheet Documentation  Taken 7/21/2024 1800 by Laurel Frazier RN  Body Position: position changed independently  Taken 7/21/2024 0815 by Laurel Frazier RN  Body Position: position changed independently  Skin Protection:   tubing/devices free from skin contact   skin-to-device areas padded  Intervention: Prevent and Manage VTE (Venous Thromboembolism) Risk  Recent Flowsheet Documentation  Taken 7/21/2024 0815 by Laurel Frazier RN  Activity Management: bedrest  VTE Prevention/Management: patient refused intervention  Range of Motion: active ROM (range of motion) encouraged  Intervention: Prevent Infection  Recent Flowsheet Documentation  Taken 7/21/2024 1800 by Laurel Frazier RN  Infection Prevention: single patient room provided  Taken 7/21/2024 1600 by Laurel Frazier RN  Infection Prevention: single  patient room provided  Taken 7/21/2024 1200 by Laurel Frazier RN  Infection Prevention: single patient room provided  Taken 7/21/2024 1000 by Laurel Frazier RN  Infection Prevention: single patient room provided  Taken 7/21/2024 0815 by Laurel Frazier RN  Infection Prevention: single patient room provided  Goal: Optimal Comfort and Wellbeing  Outcome: Ongoing, Progressing  Intervention: Provide Person-Centered Care  Recent Flowsheet Documentation  Taken 7/21/2024 1415 by Laurel Frazier RN  Trust Relationship/Rapport: care explained  Taken 7/21/2024 0815 by Laurel Frazier RN  Trust Relationship/Rapport:   care explained   thoughts/feelings acknowledged  Goal: Readiness for Transition of Care  Outcome: Ongoing, Progressing     Problem: Adjustment to Illness (Stroke, Ischemic/Transient Ischemic Attack)  Goal: Optimal Coping  Outcome: Ongoing, Progressing  Intervention: Support Psychosocial Response to Stroke  Recent Flowsheet Documentation  Taken 7/21/2024 1415 by Laurel Frazier RN  Family/Support System Care: support provided  Taken 7/21/2024 0815 by Laurel Frazier RN  Family/Support System Care: support provided   Goal Outcome Evaluation:   Pt remain in ccu on room air. A& O X4 NIHSS 0. Pt needs to be evaluated by PT OT. Notified PT. No one show up today couldn't contact anyone. Notified Dr. Shipman. CTA scan pending. Called CT scan received msg to call back once they fix the injector. Vital stable. Schedule for biopsy tomorrow. Ongoing plan of care.

## 2024-07-21 NOTE — PROGRESS NOTES
"Dr. OSCAR Kirkpatrick    [facility] CORONARY CARE        Patient ID:  Name:  Roby Ott  MRN:  [id number]  1955  68 y.o.  male            CC/Reason for visit: Acute stroke    Interval hx: Patient is feeling stronger in his left upper extremity.  The dysmetria is improving, finger-nose coordination is improving.  No speech disturbance.  Denies blurry vision    ROS: No chest pain, no abdominal pain    Vitals:  Vitals:    07/20/24 2102 07/21/24 0000 07/21/24 0200 07/21/24 0800   BP: 125/70 104/69 105/52 109/55   BP Location: Left arm Left arm Left arm Left arm   Patient Position: Lying Lying Lying Lying   Pulse: 58 57 61    Resp: 16 18 18 13   Temp: 98 °F (36.7 °C) 98.1 °F (36.7 °C) 97.8 °F (36.6 °C) 98.1 °F (36.7 °C)   TempSrc: Oral Oral Oral Oral   SpO2: 96% 93% 96%    Weight:       Height:               Body mass index is 24.28 kg/m².    Intake/Output Summary (Last 24 hours) at 7/21/2024 1115  Last data filed at 7/20/2024 1644  Gross per 24 hour   Intake --   Output 240 ml   Net -240 ml       Exam:  GEN:  No distress  Alert, oriented x 4, moves all 4 extremities on command.  Still has some dysmetria in the left arm finger-to-nose.  Fluent speech  LUNGS: Clear breath sounds bilat, no use of accessory muscles  CV:  Normal S1S2, without murmur, no edema  ABD:  Non tender, no enlarged liver or masses      Scheduled meds:  aspirin, 325 mg, Oral, Daily   Or  aspirin, 300 mg, Rectal, Daily  atorvastatin, 80 mg, Oral, Nightly  sodium chloride, 10 mL, Intravenous, Once  sodium chloride, 10 mL, Intravenous, Q12H      IV meds:                           Data Review:   I reviewed the patient's medications and new clinical results.    No results found for: \"COVID19\"      Lab Results   Component Value Date    CALCIUM 8.3 (L) 07/21/2024     Results from last 7 days   Lab Units 07/21/24  0502 07/21/24  0501 07/20/24  0429 07/19/24  0401 07/18/24 1951 07/18/24 1948   SODIUM mmol/L 135*  --  138 139  --  139 "   POTASSIUM mmol/L 4.0  --  4.4 3.7  --  3.7   CHLORIDE mmol/L 104  --  104 106  --  103   CO2 mmol/L 20.7*  --  27.1 26.7  --  27.9   BUN mg/dL 11  --  9 8  --  9   CREATININE mg/dL 0.86  --  0.87 0.93   < > 1.03   CALCIUM mg/dL 8.3*  --  8.8 8.8  --  9.1   BILIRUBIN mg/dL  --   --   --   --   --  0.3   ALK PHOS U/L  --   --   --   --   --  113   ALT (SGPT) U/L  --   --   --   --   --  6   AST (SGOT) U/L  --   --   --   --   --  17   GLUCOSE mg/dL 92  --  109* 106*  --  94   WBC 10*3/mm3 11.58*  --  12.28* 11.15*  --  10.22   HEMOGLOBIN g/dL 13.9  --  14.8 13.8  --  14.0   PLATELETS 10*3/mm3 214  --  229 221  --  226   INR   --  1.10  --   --   --  0.98    < > = values in this interval not displayed.            ASSESSMENT:     Stroke    Right lower lobe lung mass    Borderline type 2 diabetes mellitus    Tobacco abuse        PLAN:  From a pulmonary standpoint, the patient was advised to undergo CT-guided lung biopsy of the right lung mass tomorrow while he is here.  He is adamant about leaving the hospital tomorrow after the lung biopsy.  I told him we will have to make sure he does not have pneumothorax with chest x-ray after biopsy.  If he does leave the hospital tomorrow, he will have to communicate with our office Wednesday afternoon or Thursday morning to receive final pathology result of the biopsy.  I told him that I am highly suspicious that this is actual lung cancer given the appearance of the mass on CT and his tobacco history.  Both he and his loved one at the bedside expressed understanding.  I told him he will have to see an oncologist to determine further treatment if this is lung cancer.  He expressed understanding        Naresh Kirkpatrick MD  7/21/2024

## 2024-07-22 ENCOUNTER — APPOINTMENT (OUTPATIENT)
Dept: GENERAL RADIOLOGY | Facility: HOSPITAL | Age: 69
DRG: 062 | End: 2024-07-22
Payer: MEDICARE

## 2024-07-22 ENCOUNTER — APPOINTMENT (OUTPATIENT)
Dept: CT IMAGING | Facility: HOSPITAL | Age: 69
DRG: 062 | End: 2024-07-22
Payer: MEDICARE

## 2024-07-22 LAB
ALBUMIN SERPL-MCNC: 3.7 G/DL (ref 3.5–5.2)
ALBUMIN/GLOB SERPL: 1.2 G/DL
ALP SERPL-CCNC: 101 U/L (ref 39–117)
ALT SERPL W P-5'-P-CCNC: 8 U/L (ref 1–41)
ANION GAP SERPL CALCULATED.3IONS-SCNC: 11.3 MMOL/L (ref 5–15)
AST SERPL-CCNC: 21 U/L (ref 1–40)
BILIRUB SERPL-MCNC: 0.8 MG/DL (ref 0–1.2)
BUN SERPL-MCNC: 12 MG/DL (ref 8–23)
BUN/CREAT SERPL: 14 (ref 7–25)
CALCIUM SPEC-SCNC: 8.8 MG/DL (ref 8.6–10.5)
CHLORIDE SERPL-SCNC: 101 MMOL/L (ref 98–107)
CO2 SERPL-SCNC: 21.7 MMOL/L (ref 22–29)
CREAT SERPL-MCNC: 0.86 MG/DL (ref 0.76–1.27)
DEPRECATED RDW RBC AUTO: 41.4 FL (ref 37–54)
EGFRCR SERPLBLD CKD-EPI 2021: 93.7 ML/MIN/1.73
ERYTHROCYTE [DISTWIDTH] IN BLOOD BY AUTOMATED COUNT: 12.4 % (ref 12.3–15.4)
GLOBULIN UR ELPH-MCNC: 3 GM/DL
GLUCOSE SERPL-MCNC: 108 MG/DL (ref 65–99)
HCT VFR BLD AUTO: 43.5 % (ref 37.5–51)
HGB BLD-MCNC: 14.6 G/DL (ref 13–17.7)
MAGNESIUM SERPL-MCNC: 2.2 MG/DL (ref 1.6–2.4)
MCH RBC QN AUTO: 31.1 PG (ref 26.6–33)
MCHC RBC AUTO-ENTMCNC: 33.6 G/DL (ref 31.5–35.7)
MCV RBC AUTO: 92.8 FL (ref 79–97)
PHOSPHATE SERPL-MCNC: 2.4 MG/DL (ref 2.5–4.5)
PLATELET # BLD AUTO: 249 10*3/MM3 (ref 140–450)
PMV BLD AUTO: 10.7 FL (ref 6–12)
POTASSIUM SERPL-SCNC: 4.1 MMOL/L (ref 3.5–5.2)
PROT SERPL-MCNC: 6.7 G/DL (ref 6–8.5)
RBC # BLD AUTO: 4.69 10*6/MM3 (ref 4.14–5.8)
SODIUM SERPL-SCNC: 134 MMOL/L (ref 136–145)
WBC NRBC COR # BLD AUTO: 12.25 10*3/MM3 (ref 3.4–10.8)

## 2024-07-22 PROCEDURE — 88305 TISSUE EXAM BY PATHOLOGIST: CPT | Performed by: INTERNAL MEDICINE

## 2024-07-22 PROCEDURE — 71045 X-RAY EXAM CHEST 1 VIEW: CPT

## 2024-07-22 PROCEDURE — 99232 SBSQ HOSP IP/OBS MODERATE 35: CPT

## 2024-07-22 PROCEDURE — 25010000002 MIDAZOLAM PER 1 MG: Performed by: RADIOLOGY

## 2024-07-22 PROCEDURE — 97530 THERAPEUTIC ACTIVITIES: CPT

## 2024-07-22 PROCEDURE — 25510000001 IOPAMIDOL PER 1 ML: Performed by: HOSPITALIST

## 2024-07-22 PROCEDURE — 25010000002 FENTANYL CITRATE (PF) 50 MCG/ML SOLUTION: Performed by: RADIOLOGY

## 2024-07-22 PROCEDURE — 97116 GAIT TRAINING THERAPY: CPT

## 2024-07-22 PROCEDURE — 80053 COMPREHEN METABOLIC PANEL: CPT | Performed by: HOSPITALIST

## 2024-07-22 PROCEDURE — 88360 TUMOR IMMUNOHISTOCHEM/MANUAL: CPT | Performed by: INTERNAL MEDICINE

## 2024-07-22 PROCEDURE — 85027 COMPLETE CBC AUTOMATED: CPT | Performed by: HOSPITALIST

## 2024-07-22 PROCEDURE — 88342 IMHCHEM/IMCYTCHM 1ST ANTB: CPT | Performed by: INTERNAL MEDICINE

## 2024-07-22 PROCEDURE — 84100 ASSAY OF PHOSPHORUS: CPT | Performed by: HOSPITALIST

## 2024-07-22 PROCEDURE — 88312 SPECIAL STAINS GROUP 1: CPT | Performed by: INTERNAL MEDICINE

## 2024-07-22 PROCEDURE — 99152 MOD SED SAME PHYS/QHP 5/>YRS: CPT

## 2024-07-22 PROCEDURE — 88341 IMHCHEM/IMCYTCHM EA ADD ANTB: CPT | Performed by: INTERNAL MEDICINE

## 2024-07-22 PROCEDURE — 0BBF3ZX EXCISION OF RIGHT LOWER LUNG LOBE, PERCUTANEOUS APPROACH, DIAGNOSTIC: ICD-10-PCS | Performed by: RADIOLOGY

## 2024-07-22 PROCEDURE — 71275 CT ANGIOGRAPHY CHEST: CPT

## 2024-07-22 PROCEDURE — 83735 ASSAY OF MAGNESIUM: CPT | Performed by: HOSPITALIST

## 2024-07-22 RX ORDER — MIDAZOLAM HYDROCHLORIDE 1 MG/ML
INJECTION INTRAMUSCULAR; INTRAVENOUS AS NEEDED
Status: COMPLETED | OUTPATIENT
Start: 2024-07-22 | End: 2024-07-22

## 2024-07-22 RX ORDER — SODIUM CHLORIDE 9 MG/ML
INJECTION, SOLUTION INTRAVENOUS CONTINUOUS PRN
Status: COMPLETED | OUTPATIENT
Start: 2024-07-22 | End: 2024-07-22

## 2024-07-22 RX ORDER — LIDOCAINE HYDROCHLORIDE 10 MG/ML
20 INJECTION, SOLUTION INFILTRATION; PERINEURAL ONCE
Status: DISCONTINUED | OUTPATIENT
Start: 2024-07-22 | End: 2024-07-23 | Stop reason: HOSPADM

## 2024-07-22 RX ORDER — FENTANYL CITRATE 50 UG/ML
INJECTION, SOLUTION INTRAMUSCULAR; INTRAVENOUS AS NEEDED
Status: COMPLETED | OUTPATIENT
Start: 2024-07-22 | End: 2024-07-22

## 2024-07-22 RX ADMIN — Medication 10 ML: at 09:49

## 2024-07-22 RX ADMIN — MIDAZOLAM 1 MG: 1 INJECTION INTRAMUSCULAR; INTRAVENOUS at 16:49

## 2024-07-22 RX ADMIN — IOPAMIDOL 95 ML: 755 INJECTION, SOLUTION INTRAVENOUS at 17:18

## 2024-07-22 RX ADMIN — FENTANYL CITRATE 25 MCG: 50 INJECTION, SOLUTION INTRAMUSCULAR; INTRAVENOUS at 16:49

## 2024-07-22 RX ADMIN — Medication 10 ML: at 20:51

## 2024-07-22 RX ADMIN — FENTANYL CITRATE 25 MCG: 50 INJECTION, SOLUTION INTRAMUSCULAR; INTRAVENOUS at 16:50

## 2024-07-22 RX ADMIN — SODIUM CHLORIDE 35 ML/HR: 9 INJECTION, SOLUTION INTRAVENOUS at 16:36

## 2024-07-22 RX ADMIN — ATORVASTATIN CALCIUM 80 MG: 80 TABLET, FILM COATED ORAL at 20:51

## 2024-07-22 NOTE — CASE MANAGEMENT/SOCIAL WORK
Continued Stay Note  [facility]     Patient Name: Roby Ott  MRN: [id number]  Today's Date: 7/22/2024    Admit Date: 7/18/2024    Plan: Home with family. PCP appt sceduled and on AVS   Discharge Plan       Row Name 07/22/24 1540       Plan    Plan Comments poke with patient at bedside and he is agreeable to let [facility] set up outpatient therapy at [facility]. See appointments and instructins on AVS. Information given to patient and spouse. Both verbalize understanding to appointment and are agreeable.      Row Name 07/22/24 1248       Plan    Plan Home with family. PCP appt sceduled and on AVS    Roadmap to Recovery Yes    Patient/Family in Agreement with Plan yes    Plan Comments Spoke with spouse and patient at bedside. Patient has no PCP. Facesheet and pharmacy verified. Patient agreeanle to [facility] setting up PCP appointment. Call placed to connection hub for [facility] and appointment is scheduled and on AVS. Gave appointment information to patient and spouse. Both verbalize understanding. Patient would like outpatient PT at [facility]. Epic chat sent to MD for orders and patient states he will call [facility] and set up himself                   Discharge Codes    No documentation.                 Expected Discharge Date and Time       Expected Discharge Date Expected Discharge Time    Jul 23, 2024               Criss Jaramillo RN

## 2024-07-22 NOTE — POST-PROCEDURE NOTE
POST PROCEDURE NOTE    Procedure: lung bx    Pre-Procedure Diagnosis: nodule/mass    Post-procedure Diagnosis: same    Findings: successful bx, bloodpatch    Complications: none    Blood loss: min    Specimen Removed: 2cores    Disposition:   Transfer back to inpatient room

## 2024-07-22 NOTE — PLAN OF CARE
Goal Outcome Evaluation:              Outcome Evaluation: Patient a/o x4, L arm is equal to R arm in strength, but still  has some weakness lifting it and slight neglect is noted as pt will use R arm without attempting to move L. All meds given as ordered. Pt is expressing desire to go home, however CT has not been completed nor has the biopsy. Pt expressed understanding. Pt stated that he was placed on mechanical soft diet due to not having teeth, but he doesn't have teeth at home and eats a regular diet. Diet updated. All meds given as ordered. No new issues noted. See v/s and labs.

## 2024-07-22 NOTE — THERAPY TREATMENT NOTE
Patient Name: Roby Ott  : 1955    MRN: [id number]                              Today's Date: 2024       Admit Date: 2024    Visit Dx:     ICD-10-CM ICD-9-CM   1. Cerebrovascular accident (CVA), unspecified mechanism  I63.9 434.91     Patient Active Problem List   Diagnosis    Stroke    Right lower lobe lung mass    Borderline type 2 diabetes mellitus    Tobacco abuse     History reviewed. No pertinent past medical history.  Past Surgical History:   Procedure Laterality Date    SKIN SURGERY      removal of ingrown hairs on face      General Information       Row Name 24 1002          Physical Therapy Time and Intention    Document Type therapy note (daily note)  -     Mode of Treatment individual therapy;physical therapy  -       Row Name 24 1002          General Information    Patient Profile Reviewed yes  -     Prior Level of Function independent:;gait;transfer;bed mobility  -     Existing Precautions/Restrictions fall  -     Barriers to Rehab none identified  -[facility] Name 24 1002          Living Environment    People in Home spouse  -[facility] Name 24 1002          Cognition    Orientation Status (Cognition) oriented x 4  -[facility] Name 24 1002          Safety Issues, Functional Mobility    Impairments Affecting Function (Mobility) balance;endurance/activity tolerance;strength  -               User Key  (r) = Recorded By, (t) = Taken By, (c) = Cosigned By      Initials Name Provider Type     Eloisa Espana PT Physical Therapist                   Mobility       [facility] Name 24 1002          Bed Mobility    Bed Mobility supine-sit  -     Supine-Sit [place] (Bed Mobility) supervision  -     Assistive Device (Bed Mobility) head of bed elevated  -       Row Name 24 1002          Sit-Stand Transfer    Sit-Stand [place] (Transfers) standby assist  -[facility] Name 24 1002          Gait/Stairs  (Locomotion)    [place] Level (Gait) standby assist  -     Assistive Device (Gait) other (see comments)  No AD  -     Distance in Feet (Gait) 300  -     Deviations/Abnormal Patterns (Gait) gait speed decreased;blanca decreased;stride length decreased;scissoring  -     Comment, (Gait/Stairs) Intermittent scissoring with unsteadiness but no overt LOB  -               User Key  (r) = Recorded By, (t) = Taken By, (c) = Cosigned By      Initials Name Provider Type     Eloisa Espana PT Physical Therapist                   Obj/Interventions       Row Name 07/22/24 1005          Range of Motion Comprehensive    General Range of Motion bilateral lower extremity ROM WFL  -[facility] Name 07/22/24 1005          Strength Comprehensive (MMT)    General Manual Muscle Testing (MMT) Assessment lower extremity strength deficits identified  -     Comment, General Manual Muscle Testing (MMT) Assessment LUE weakness, BLE grossly 4/5  -[facility] Name 07/22/24 1005          Balance    Balance Assessment sitting static balance;standing static balance;standing dynamic balance;sitting dynamic balance  -     Static Sitting Balance standby assist  -     Dynamic Sitting Balance standby assist  -     Position, Sitting Balance unsupported;sitting edge of bed  -     Static Standing Balance standby assist  -     Dynamic Standing Balance standby assist  -     Position/Device Used, Standing Balance unsupported  -     Balance Interventions sitting;standing;sit to stand;static;dynamic  -[facility] Name 07/22/24 1005          Sensory Assessment (Somatosensory)    Sensory Assessment (Somatosensory) LE sensation intact  -               User Key  (r) = Recorded By, (t) = Taken By, (c) = Cosigned By      Initials Name Provider Type     Eloisa Espana PT Physical Therapist                   Goals/Plan       Row Name 07/22/24 1011          Bed Mobility Goal 1 (PT)    Activity/Assistive Device (Bed Mobility  Goal 1, PT) bed mobility activities, all  -     [place] Level/Cues Needed (Bed Mobility Goal 1, PT) independent  -     Time Frame (Bed Mobility Goal 1, PT) 1 week  -       Row Name 07/22/24 1011          Transfer Goal 1 (PT)    Activity/Assistive Device (Transfer Goal 1, PT) transfers, all  -     [place] Level/Cues Needed (Transfer Goal 1, PT) independent  -BH     Time Frame (Transfer Goal 1, PT) 1 week  -       Row Name 07/22/24 1011          Gait Training Goal 1 (PT)    Activity/Assistive Device (Gait Training Goal 1, PT) gait (walking locomotion)  -     [place] Level (Gait Training Goal 1, PT) independent  -     Distance (Gait Training Goal 1, PT) 300ft  -     Time Frame (Gait Training Goal 1, PT) 1 week  -       Row Name 07/22/24 1011          Therapy Assessment/Plan (PT)    Planned Therapy Interventions (PT) balance training;bed mobility training;gait training;home exercise program;patient/family education;strengthening;transfer training  -               User Key  (r) = Recorded By, (t) = Taken By, (c) = Cosigned By      Initials Name Provider Type     Eloisa Espana, PT Physical Therapist                   Clinical Impression       Row Name 07/22/24 1005          Pain    Pretreatment Pain Rating 0/10 - no pain  -     Posttreatment Pain Rating 0/10 - no pain  -       Row Name 07/22/24 1005          Plan of Care Review    Plan of Care Reviewed With patient  [facility]     Progress improving  -     Outcome Evaluation Pt seen for PT treatment this AM - pt was evaluated by PT on Friday and reports he is very frustrated he was not allowed out of the bed all weekend. Pt transferred to EOB with SV, STS with SBA, and ambulated 300ft with SBA and no AD. Pt remains unsteady and somewhat impulsive, but educated on safety and verbalized understanding through remains frustrated about being in hospital. Pt with intermittent scissoring especially on turns but able to correct and no overt  LOB. Pt returned to room and demo'd independence with toileting. Agreeable to sitting UIC and left with needs met - educated about note getting up unassisted. Safe to get up to bathroom with family assist, but will need to call out for assist from nsg if family not present and pt verbalized understanding. Left pt sitting on chair alarm if needed and d/w RN about mobility recommendations. PT will continue to follow peripherally, anticipate DC home with family assist. Pt's primary deficit at this time is his LUE and OT is following.  -       Row Name 07/22/24 1005          Therapy Assessment/Plan (PT)    Rehab Potential (PT) good, to achieve stated therapy goals  -     Criteria for Skilled Interventions Met (PT) yes  -     Therapy Frequency (PT) 3 times/wk  -       Row Name 07/22/24 1005          Vital Signs    O2 Delivery Pre Treatment room air  -     O2 Delivery Intra Treatment room air  -     O2 Delivery Post Treatment room air  -       Row Name 07/22/24 1005          Positioning and Restraints    Pre-Treatment Position in bed  -     Post Treatment Position chair  -     In Chair notified nsg;reclined;call light within reach;encouraged to call for assist  -               User Key  (r) = Recorded By, (t) = Taken By, (c) = Cosigned By      Initials Name Provider Type     Eloisa Espana, PT Physical Therapist                   Outcome Measures       Row Name 07/22/24 1011          How much help from another person do you currently need...    Turning from your back to your side while in flat bed without using bedrails? 4  -BH     Moving from lying on back to sitting on the side of a flat bed without bedrails? 4  -BH     Moving to and from a bed to a chair (including a wheelchair)? 4  -BH     Standing up from a chair using your arms (e.g., wheelchair, bedside chair)? 3  -BH     Climbing 3-5 steps with a railing? 3  -BH     To walk in hospital room? 3  -BH     AM-PAC 6 Clicks Score (PT) 21  -BH      Highest Level of Mobility Goal 6 --> Walk 10 steps or more  -       Row Name 07/22/24 1011          Functional Assessment    Outcome Measure Options AM-PAC 6 Clicks Basic Mobility (PT)  -               User Key  (r) = Recorded By, (t) = Taken By, (c) = Cosigned By      Initials Name Provider Type     Eloisa Espana PT Physical Therapist                                 Physical Therapy Education       Title: PT OT SLP Therapies (In Progress)       Topic: Physical Therapy (In Progress)       Point: Mobility training (Done)       Learning Progress Summary             Patient Acceptance, E,TB,D, VU,NR by  at 7/22/2024 1012                         Point: Home exercise program (Not Started)       Learner Progress:  Not documented in this visit.              Point: Body mechanics (Done)       Learning Progress Summary             Patient Acceptance, E,TB,D, VU,NR by  at 7/22/2024 1012                         Point: Precautions (Done)       Learning Progress Summary             Patient Acceptance, E,TB,D, VU,NR by  at 7/22/2024 1012                                         User Key       Initials Effective Dates Name Provider Type [facility] 04/08/22 -  Eloisa Espana PT Physical Therapist PT                  PT Recommendation and Plan  Planned Therapy Interventions (PT): balance training, bed mobility training, gait training, home exercise program, patient/family education, strengthening, transfer training  Plan of Care Reviewed With: patient  Progress: improving  Outcome Evaluation: Pt seen for PT treatment this AM - pt was evaluated by PT on Friday and reports he is very frustrated he was not allowed out of the bed all weekend. Pt transferred to EOB with SV, STS with SBA, and ambulated 300ft with SBA and no AD. Pt remains unsteady and somewhat impulsive, but educated on safety and verbalized understanding through remains frustrated about being in hospital. Pt with intermittent scissoring especially  on turns but able to correct and no overt LOB. Pt returned to room and demo'd independence with toileting. Agreeable to sitting UIC and left with needs met - educated about note getting up unassisted. Safe to get up to bathroom with family assist, but will need to call out for assist from [facility] if family not present and pt verbalized understanding. Left pt sitting on chair alarm if needed and d/w RN about mobility recommendations. PT will continue to follow peripherally, anticipate DC home with family assist. Pt's primary deficit at this time is his LUE and OT is following.     Time Calculation:         PT Charges       Row Name 07/22/24 1012             Time Calculation    Start Time 0907  -      Stop Time 0930  -      Time Calculation (min) 23 min  -      PT Received On 07/22/24  -      PT - Next Appointment 07/24/24  -         Time Calculation- PT    Total Timed Code Minutes- PT 23 minute(s)  -         Timed Charges    76398 - Gait Training Minutes  10  -      35791 - PT Therapeutic Activity Minutes 13  -         Total Minutes    Timed Charges Total Minutes 23  -       Total Minutes 23  -                User Key  (r) = Recorded By, (t) = Taken By, (c) = Cosigned By      Initials Name Provider Type     Eloisa Espana, PT Physical Therapist                  Therapy Charges for Today       Code Description Service Date Service Provider Modifiers Qty    42262691047 HC GAIT TRAINING EA 15 MIN 7/22/2024 Eloisa Espana, PT GP 1    [id number]  PT THERAPEUTIC ACT EA 15 MIN 7/22/2024 Eloisa Espana, PT GP 1            PT G-Codes  Outcome Measure Options: AM-PAC 6 Clicks Basic Mobility (PT)  AM-PAC 6 Clicks Score (PT): 21  PT Discharge Summary  Anticipated Discharge Disposition (PT): home with assist    Eloisa Espana PT  7/22/2024

## 2024-07-22 NOTE — DISCHARGE INSTR - APPOINTMENTS
An appointment has been made with:  [facility]   on 7/30/2024 at 8am    (Next to [employer] [facility] [place]) at [address] [place] [address] [phone]

## 2024-07-22 NOTE — CONSULTS
"Nutrition Services    Patient Name:  Roby Ott  YOB: 1955  MRN: [id number]  Admit Date:  7/18/2024  Assessment Date:  07/22/24    CLINICAL NUTRITION - EDUCATION     ASSESSMENT  Encounter Information         Consult from Physician    Education topic Diabetes    Learner Patient, Spouse, Child or Children    Learning readiness Motivated to learn    Pertinent nutrition history Drinks lots of regular coke     Anthropometrics         Height Height: 185.4 cm (73\")    Weight Weight: 83.5 kg (184 lb) (07/19/24 1911)    BMI  Body mass index is 24.28 kg/m².   Normal/Healthy (18.4 - 24.9)    Comments      Medication/Biochemical                Pertinent lab values Results from last 7 days   Lab Units 07/22/24  0552 07/21/24  0502 07/20/24  0429 07/18/24 1951 07/18/24 1948   SODIUM mmol/L 134* 135* 138   < > 139   POTASSIUM mmol/L 4.1 4.0 4.4   < > 3.7   CHLORIDE mmol/L 101 104 104   < > 103   CO2 mmol/L 21.7* 20.7* 27.1   < > 27.9   BUN mg/dL 12 11 9   < > 9   CREATININE mg/dL 0.86 0.86 0.87   < > 1.03   CALCIUM mg/dL 8.8 8.3* 8.8   < > 9.1   BILIRUBIN mg/dL 0.8  --   --   --  0.3   ALK PHOS U/L 101  --   --   --  113   ALT (SGPT) U/L 8  --   --   --  6   AST (SGOT) U/L 21  --   --   --  17   GLUCOSE mg/dL 108* 92 109*   < > 94    < > = values in this interval not displayed.       Lab Results   Component Value Date    HGBA1C 6.20 (H) 07/19/2024        DIAGNOSIS  PES Statement         Problem  Food and nutrition knowledge deficit    Etiology Medical Diagnosis - stroke    Signs/Symptoms Information deficit, Report/observation     INTERVENTION  Intervention/Evaluation         Goal   Disease management/therapy, Improved nutrition related labs, Provide information     Educated regarding Beverage choices, Food choices, Snacks    Resources given Discussion only, Printed materials    Monitor/evaluation  Per protocol    Discharge planning Contact RD if questions/concerns     RD to follow per protocol. "     Electronically signed by:  Chandrika Laura RD  07/22/24 11:28 EDT

## 2024-07-22 NOTE — PROGRESS NOTES
Name: Roby Ott ADMIT: 2024   : 1955  PCP: Provider, No Known    MRN: [id number] LOS: 4 days   AGE/SEX: 68 y.o. male  ROOM: [facility]     Subjective   Subjective   Pt feeling better since admission. Still weak on left side. No changes today. Up ad neptali in room. No swallowing issues. No HA or vis changes. No speech issues. Voiding well. Tolerating diet. No N/V/D/abd pain. No F/C/NS. No SOA or CP.       Objective   Objective   Vital Signs  Temp:  [98 °F (36.7 °C)-98.4 °F (36.9 °C)] 98 °F (36.7 °C)  Heart Rate:  [53-71] 70  Resp:  [16-20] 16  BP: (107-134)/(59-88) 128/66  SpO2:  [92 %-96 %] 96 %  on   ;   Device (Oxygen Therapy): room air  Body mass index is 24.28 kg/m².    (No change in exam today)    Physical Exam  Vitals and nursing note reviewed. Exam conducted with a chaperone present (Wife, wife's son, and Dr. Dupree).   Constitutional:       General: He is not in acute distress.     Appearance: He is ill-appearing (chronically). He is not toxic-appearing or diaphoretic.   HENT:      Head: Normocephalic.      Nose: Nose normal.      Mouth/Throat:      Mouth: Mucous membranes are moist.      Pharynx: Oropharynx is clear.   Eyes:      General: No scleral icterus.        Right eye: No discharge.         Left eye: No discharge.      Extraocular Movements: Extraocular movements intact.      Conjunctiva/sclera: Conjunctivae normal.   Cardiovascular:      Rate and Rhythm: Normal rate and regular rhythm.      Pulses: Normal pulses.   Pulmonary:      Effort: Pulmonary effort is normal. No respiratory distress.      Breath sounds: Normal breath sounds. No wheezing or rales.   Abdominal:      General: Bowel sounds are normal. There is no distension.      Palpations: Abdomen is soft.      Tenderness: There is no abdominal tenderness.   Musculoskeletal:         General: No swelling. Normal range of motion.      Cervical back: Neck supple.   Skin:     General: Skin is warm and dry.      Capillary Refill:  Capillary refill takes less than 2 seconds.      Coloration: Skin is not jaundiced.   Neurological:      Mental Status: He is alert and oriented to person, place, and time.      Cranial Nerves: No cranial nerve deficit.      Motor: Weakness (left-sided) present.      Coordination: Coordination abnormal (dysmetria of LUE).   Psychiatric:         Mood and Affect: Mood normal.         Behavior: Behavior normal.       Results Review     I reviewed the patient's new clinical results.  Results from last 7 days   Lab Units 07/22/24  0552 07/21/24  0502 07/20/24 0429 07/19/24 0401   WBC 10*3/mm3 12.25* 11.58* 12.28* 11.15*   HEMOGLOBIN g/dL 14.6 13.9 14.8 13.8   PLATELETS 10*3/mm3 249 214 229 221     Results from last 7 days   Lab Units 07/22/24  0552 07/21/24  0502 07/20/24 0429 07/19/24  0401   SODIUM mmol/L 134* 135* 138 139   POTASSIUM mmol/L 4.1 4.0 4.4 3.7   CHLORIDE mmol/L 101 104 104 106   CO2 mmol/L 21.7* 20.7* 27.1 26.7   BUN mg/dL 12 11 9 8   CREATININE mg/dL 0.86 0.86 0.87 0.93   GLUCOSE mg/dL 108* 92 109* 106*   EGFR mL/min/1.73 93.7 93.7 93.4 88.9     Results from last 7 days   Lab Units 07/22/24  0552 07/18/24 1948   ALBUMIN g/dL 3.7 3.9   BILIRUBIN mg/dL 0.8 0.3   ALK PHOS U/L 101 113   AST (SGOT) U/L 21 17   ALT (SGPT) U/L 8 6     Results from last 7 days   Lab Units 07/22/24  0552 07/21/24  0502 07/20/24 0429 07/19/24 0401 07/18/24 1948   CALCIUM mg/dL 8.8 8.3* 8.8 8.8 9.1   ALBUMIN g/dL 3.7  --   --   --  3.9   MAGNESIUM mg/dL 2.2  --   --   --   --    PHOSPHORUS mg/dL 2.4*  --   --   --   --        Glucose   Date/Time Value Ref Range Status   07/21/2024 0107 111 70 - 130 mg/dL Final   07/20/2024 1758 185 (H) 70 - 130 mg/dL Final   07/20/2024 0955 170 (H) 70 - 130 mg/dL Final   07/20/2024 0723 102 70 - 130 mg/dL Final   07/19/2024 1558 102 70 - 130 mg/dL Final       No radiology results for the last day    I have personally reviewed all medications:  Scheduled Medications  aspirin, 325 mg, Oral,  Daily   Or  aspirin, 300 mg, Rectal, Daily  atorvastatin, 80 mg, Oral, Nightly  sodium chloride, 10 mL, Intravenous, Once  sodium chloride, 10 mL, Intravenous, Q12H    Infusions   Diet  Diet: Cardiac; Healthy Heart (2-3 Na+); Texture: Regular (IDDSI 7); Fluid Consistency: Thin (IDDSI 0)    I have personally reviewed:  [x]  Laboratory   []  Microbiology   [x]  Radiology   [x]  EKG/Telemetry  []  Cardiology/Vascular   []  Pathology    []  Records       Assessment/Plan     Active Hospital Problems    Diagnosis  POA    **Stroke [I63.9]  Yes    Borderline type 2 diabetes mellitus [R73.03]  Yes    Tobacco abuse [Z72.0]  Yes    Right lower lobe lung mass [R91.8]  Yes      Resolved Hospital Problems   No resolved problems to display.       67yo smoker w/o significant medical history who presented to ER via EMS with left-sided weakness and was treated with TNK. He was admitted to ICU and we were asked to assume care when he transferred out. He was also noted to have a new RLL lung mass concerning for primary lung CA.    Bilateral acute CVAs (R>L)  S/p TNK in ER  Left-sided weakness improving  Echo reassuring  Neuro recommends ASA 325mg and Lipitor 80mg  Consider AC if he has any further embolic events--at high risk given new cancer diagnosis (see below)  BPs at goal  Tobacco cessation  ZioPatch at dc  PT okay with home and outpt PT (pt would prefer [facility]--have placed ambulatory referral in [facility])  F/u with Neuro in 1 month (Penny)    RLL lung mass  CT-guided needle biopsy today  Pulm and IR recommend pt stay overnight to insure no postprocedure PTX--pt unsure whether he will comply with this    Elevated DDimer  Mildly elevated at 0.83  CTA Chest ordered by Pulm    Hyperglycemia/Pre-diabetes  A1c 6.2  Sugars acceptable  Will need to f/u with PCP for monitoring    Tobacco abuse  Recommend cessation      SCDs for DVT prophylaxis.  Full code.  Discussed with patient, wife, Dr. Dupree, and CCP.  Anticipate discharge home with  family  tomorrow.  Expected Discharge Date: 7/23/2024; Expected Discharge Time:       Umberto Wright MD  [facility]  07/22/24  13:42 EDT

## 2024-07-22 NOTE — PROGRESS NOTES
"DOS: 2024  NAME: Roby Ott   : 1955  PCP: Provider, No Known  Chief Complaint   Patient presents with    Numbness     Stroke    Subjective: Patient sitting up in chair watching TV when entered the room. Patient states that strength is much improved from yesterday. He denies any other focal deficits. Patient with no acute events overnight. Pulmonology planning to do biopsy of lung mass today. No family currently at bedside.     Interval History  History taken from: patient    Objective:  Vital signs: /66 (BP Location: Left arm, Patient Position: Sitting)   Pulse 70   Temp 98 °F (36.7 °C) (Oral)   Resp 16   Ht 185.4 cm (73\")   Wt 83.5 kg (184 lb)   SpO2 96%   BMI 24.28 kg/m²       Physical Exam:  GENERAL: NAD  MS: AOx4. Language normal. No neglect. Higher integrative function normal  CN: II-XII normal  Motor: 4+/5 left upper extremtiy, 5/5 right upper extremity and bilateral lower extremities  Sensory: Intact to cold temperature throughout  Coordination: Normal finger to nose test bilaterally    Results Review:     I reviewed the patient's new clinical results.    Current Medications:  Scheduled Medications:aspirin, 325 mg, Oral, Daily   Or  aspirin, 300 mg, Rectal, Daily  atorvastatin, 80 mg, Oral, Nightly  sodium chloride, 10 mL, Intravenous, Once  sodium chloride, 10 mL, Intravenous, Q12H      Infusions:    PRN Medications:    sodium chloride    sodium chloride    sodium chloride    Medications Reviewed:     Laboratory results:  Lab Results   Component Value Date    GLUCOSE 108 (H) 2024    CALCIUM 8.8 2024     (L) 2024    K 4.1 2024    CO2 21.7 (L) 2024     2024    BUN 12 2024    CREATININE 0.86 2024    BCR 14.0 2024    ANIONGAP 11.3 2024     Lab Results   Component Value Date    WBC 12.25 (H) 2024    HGB 14.6 2024    HCT 43.5 2024    MCV 92.8 2024     2024      Results from " "last 7 days   Lab Units 07/19/24  0401   CHOLESTEROL mg/dL 185     Lab Results   Component Value Date    INR 1.10 07/21/2024    INR 0.98 07/18/2024    PROTIME 14.4 (H) 07/21/2024    PROTIME 13.2 07/18/2024     No results found for: \"TSH\"  No components found for: \"LDLCALC\"  Lab Results   Component Value Date    HGBA1C 6.20 (H) 07/19/2024     No components found for: \"B12\"    Review and interpretation of imaging:  D-dimer 0.83  WBC 12.25    CT head no acute hypodensity or hypodensity, benign lesion on the right parietal bone     CTA head and neck no large vessel occlusion or stenosis mild athero of the left ICA     MRI Brain MRI showed acute diffusion restriction along the right MCA left frontal and also right PCA, just infarct along the right MCA no SWI changes     TTE 80% EF normal LAE no clot no thrombus no PFO      Diagnosis:  Multiple territory infarction s/p TNK, likely embolic  Lung mass on CXR and CT chest  Tobacco abuse    Impression: Patient is 69 y/o male with history of tobacco abuse, lung mass presented to the ED with on 7/18 with acute left sided weakness. Last night around 6:50 pm he had acute left sided weakness/numbness so he decided to come to the ER. CT head did not show hemorrhage or hypodensity and CTA was negative for LVO. TNK was given at 2025. MRI brain showed acute infraction in the right MCA, left frontal and right PCA.  TTE done demonstrates 80% EF, normal LAE with no thrombus or PFO. CXR demonstrated lung mass. CT chest showed multi lobulated right lower lobe probable lung malignancy with pulmonary nodules bilateral lower lungs concerning for malignancy. Plan is to have patient have lung biopsy today and concerning carcinoma placing patient in hypercoaguable state. D-dimer 0.83. Plan is ton continue patient on ASA/statin and zio patch at discharge. Patient has been smoking 2 PPD for 54.6 years and is eager to quit and discussed smoking cessation with patient and seemed receptive. Patient " wife is supportive of him quitting as well.     Plan:  -BP goal less than 150 mmHg  - mg, Lipitor 80 mg daily  -Pulmonology planning to do biopsy of lung mass today  -Tobacco cessation counseling given  -Zio patch at discharge x 14 days  -Neurology outpatient follow up in 1-2 months    I have discussed the above with the patient and family.  Amy Tafoya PA-C  07/22/24  14:17 EDT        Stroke    Right lower lobe lung mass    Borderline type 2 diabetes mellitus    Tobacco abuse

## 2024-07-22 NOTE — CASE MANAGEMENT/SOCIAL WORK
Discharge Planning Assessment  [facility]     Patient Name: Roby Ott  MRN: [id number]  Today's Date: 7/22/2024    Admit Date: 7/18/2024    Plan: Home with family. PCP appt sceduled and on AVS   Discharge Needs Assessment       Row Name 07/22/24 1247       Living Environment    People in Home spouse    Current Living Arrangements home    Potentially Unsafe Housing Conditions none    In the past 12 months has the electric, gas, oil, or water company threatened to shut off services in your home? No    Primary Care Provided by self    Provides Primary Care For no one    Family Caregiver if Needed spouse    Quality of Family Relationships helpful    Able to Return to Prior Arrangements yes       Resource/Environmental Concerns    Resource/Environmental Concerns none    Transportation Concerns none       Transportation Needs    In the past 12 months, has lack of transportation kept you from medical appointments or from getting medications? no    In the past 12 months, has lack of transportation kept you from meetings, work, or from getting things needed for daily living? No       Food Insecurity    Within the past 12 months, you worried that your food would run out before you got the money to buy more. Never true    Within the past 12 months, the food you bought just didn't last and you didn't have money to get more. Never true       Transition Planning    Patient/Family Anticipates Transition to home    Patient/Family Anticipated Services at Transition rehabilitation services    Transportation Anticipated family or friend will provide       Discharge Needs Assessment    Readmission Within the Last 30 Days no previous admission in last 30 days    Equipment Currently Used at Home none    Concerns to be Addressed discharge planning    Anticipated Changes Related to Illness none    Equipment Needed After Discharge none    Provided Post Acute Provider List? Yes    Post Acute Provider List Outpatient Therapy     Provided Post Acute Provider Quality & Resource List? N/A    Delivered To Patient    Method of Delivery In person                   Discharge Plan       Row Name 07/22/24 1248       Plan    Plan Home with family. PCP appt sceduled and on AVS    Roadmap to Recovery Yes    Patient/Family in Agreement with Plan yes    Plan Comments Spoke with spouse and patient at bedside. Patient has no PCP. Facesheet and pharmacy verified. Patient agreeanle to CCP setting up PCP appointment. Call placed to connection hub for [facility] and appointment is scheduled and on AVS. Gave appointment information to patient and spouse. Both verbalize understanding. Patient would like outpatient PT at [facility]. Epic chat sent to MD for orders and patient states he will call [facility] and set up himself                  Continued Care and Services - Admitted Since 7/18/2024    No active coordination exists for this encounter.       Expected Discharge Date and Time       Expected Discharge Date Expected Discharge Time    Jul 25, 2024            Demographic Summary       Row Name 07/22/24 1247       General Information    Admission Type inpatient    Arrived From emergency department    Required Notices Provided Important Message from Medicare    Referral Source admission list    Reason for Consult discharge planning    Preferred Language English                   Functional Status       Row Name 07/22/24 1247       Functional Status    Usual Activity Tolerance good    Current Activity Tolerance moderate       Functional Status, IADL    Medications independent    Meal Preparation independent    Housekeeping independent    Laundry independent    Shopping independent       Mental Status    General Appearance WDL WDL       Mental Status Summary    Recent Changes in Mental Status/Cognitive Functioning no changes                               Criss Jaramillo RN

## 2024-07-22 NOTE — PLAN OF CARE
Patient's vital signs have been stable throughout the day. Patient has been WDL for vital signs with nothing standing out. CT of the chest and biopsy was performed this afternoon. Chest Xray also performed post-CT. Diet orders have been put in for a cardiac diet. Smoking cessation and stroke education was performed with patient and family.     Goal Outcome Evaluation:  Plan of Care Reviewed With: patient, spouse        Progress: improving

## 2024-07-22 NOTE — NURSING NOTE
Diabetes Education    Patient Name:  Roby Ott  YOB: 1955  MRN: [id number]  Admit Date:  7/18/2024        Diabetes educator consulted for prediabetes. A1c 6.2. RD consulted for nutritional counseling.      Electronically signed by:  Demetri Gambino RN  07/22/24 07:52 EDT

## 2024-07-22 NOTE — PROGRESS NOTES
"  PROGRESS NOTE  Patient Name: Roby Ott  Age/Sex: 68 y.o. male  : 1955  MRN: [id number]    Date of Admission: 2024  Date of Encounter Visit: 24   LOS: 4 days   Patient Care Team:  Provider, No Known as PCP - General    Chief Complaint: CVA, lung mass    Hospital course: Patient had significant neurological recovery with minimal residual deficit, his NIH score is 2  He is on aspirin and Lipitor  He will have to go home on Zio patch, there is some concern about a possible embolic origin given the finding on the MRI and neurology is following  Patient was frustrated about the lack of bathroom privileges and he wanted to go home AGAINST MEDICAL ADVICE at some point however we had a detailed discussion today and he may consider the biopsy and to stay overnight since interventional radiology would not perform the procedure while on aspirin if the patient is not willing to stay overnight to monitor for any possible bleeding.      REVIEW OF SYSTEMS:   CONSTITUTIONAL: no fever or chills  CARDIOVASCULAR: No chest pain, chest pressure or chest discomfort. No palpitations or edema.   RESPIRATORY: No shortness of breath, cough or sputum.   GASTROINTESTINAL: No anorexia, nausea, vomiting or diarrhea. No abdominal pain or blood.   HEMATOLOGIC: No bleeding or bruising.     Ventilator/Non-Invasive Ventilation Settings (From admission, onward)      None              Vital Signs  Temp:  [98 °F (36.7 °C)-98.4 °F (36.9 °C)] 98.4 °F (36.9 °C)  Heart Rate:  [53-71] 70  Resp:  [16-20] 20  BP: (107-134)/(59-88) 132/73  SpO2:  [92 %-96 %] 96 %  on    Device (Oxygen Therapy): room air    Intake/Output Summary (Last 24 hours) at 2024 0904  Last data filed at 2024 1100  Gross per 24 hour   Intake --   Output 400 ml   Net -400 ml     Flowsheet Rows      Flowsheet Row First Filed Value   Admission Height 185.4 cm (73\") Documented at 2024   Admission Weight 83.7 kg (184 lb 8.4 oz) Documented at " 07/18/2024 1945          Body mass index is 24.28 kg/m².      07/18/24 1945 07/19/24 1911   Weight: 83.7 kg (184 lb 8.4 oz) 83.5 kg (184 lb)       Physical Exam:  GEN:  No acute distress, alert, cooperative, well developed   EYES:   Sclerae clear. No icterus. PERRL. Normal EOM  ENT:   External ears/nose normal, no oral lesions, no thrush, mucous membranes moist  NECK:  Supple, midline trachea, no JVD  LUNGS: Normal chest on inspection, CTAB, no wheezes. No rhonchi. No crackles. Respirations regular, even and unlabored.   CV:  Regular rhythm and rate. Normal S1/S2. No murmurs, gallops, or rubs noted.  ABD:  Soft, nontender and nondistended. Normal bowel sounds. No guarding  EXT:  Moves all extremities well.  With minimal residual weakness of the left lower and left upper extremity 4/5.  No cyanosis. No redness. No edema.   Skin: Dry, intact, no bleeding    Results Review:    Results From Last 14 Days   Lab Units 07/21/24  0501 07/19/24  0401   D DIMER QUANT MCGFEU/mL 0.83*  --    TRIGLYCERIDES mg/dL  --  90     Results from last 7 days   Lab Units 07/22/24  0552 07/21/24  0502 07/20/24  0429 07/19/24  0401 07/18/24 1951 07/18/24 1948   SODIUM mmol/L 134* 135* 138 139  --  139   POTASSIUM mmol/L 4.1 4.0 4.4 3.7  --  3.7   CHLORIDE mmol/L 101 104 104 106  --  103   CO2 mmol/L 21.7* 20.7* 27.1 26.7  --  27.9   BUN mg/dL 12 11 9 8  --  9   CREATININE mg/dL 0.86 0.86 0.87 0.93 1.10 1.03   CALCIUM mg/dL 8.8 8.3* 8.8 8.8  --  9.1   AST (SGOT) U/L 21  --   --   --   --  17   ALT (SGPT) U/L 8  --   --   --   --  6   ANION GAP mmol/L 11.3 10.3 6.9 6.3  --  8.1   ALBUMIN g/dL 3.7  --   --   --   --  3.9     Results from last 7 days   Lab Units 07/21/24  0501 07/18/24 1948   HSTROP T ng/L  --  13   D DIMER QUANT MCGFEU/mL 0.83*  --              Results from last 7 days   Lab Units 07/22/24  0552 07/21/24  0502 07/20/24  0429 07/19/24  0401 07/18/24 1948   WBC 10*3/mm3 12.25* 11.58* 12.28* 11.15* 10.22   HEMOGLOBIN g/dL  14.6 13.9 14.8 13.8 14.0   HEMATOCRIT % 43.5 41.7 45.2 42.1 42.3   PLATELETS 10*3/mm3 249 214 229 221 226   MCV fL 92.8 94.1 94.6 95.9 94.4   NEUTROPHIL % %  --  60.9 65.6 62.1 55.4   LYMPHOCYTE % %  --  25.1 21.6 25.2 30.7   MONOCYTES % %  --  9.7 8.7 8.7 9.2   EOSINOPHIL % %  --  3.5 3.3 3.3 4.0   BASOPHIL % %  --  0.5 0.3 0.4 0.5   IMM GRAN % %  --  0.3 0.5 0.3 0.2     Results from last 7 days   Lab Units 07/21/24  0501 07/18/24  1948   INR  1.10 0.98   APTT seconds  --  26.9     Results from last 7 days   Lab Units 07/22/24  0552   MAGNESIUM mg/dL 2.2     Results from last 7 days   Lab Units 07/19/24  0401   CHOLESTEROL mg/dL 185   TRIGLYCERIDES mg/dL 90   HDL CHOL mg/dL 31*         Results from last 7 days   Lab Units 07/19/24  0401   HEMOGLOBIN A1C % 6.20*     Glucose   Date/Time Value Ref Range Status   07/21/2024 0107 111 70 - 130 mg/dL Final   07/20/2024 1758 185 (H) 70 - 130 mg/dL Final   07/20/2024 0955 170 (H) 70 - 130 mg/dL Final   07/20/2024 0723 102 70 - 130 mg/dL Final   07/19/2024 1558 102 70 - 130 mg/dL Final   07/19/2024 1124 138 (H) 70 - 130 mg/dL Final                               Imaging:   Imaging Results (All)       Procedure Component Value Units Date/Time    MRI Brain With & Without Contrast [[id number]] Collected: 07/20/24 0049     Updated: 07/20/24 0855    Narrative:      MRI OF THE BRAIN WITH AND WITHOUT CONTRAST ON 07/19/2024     CLINICAL HISTORY: This is an 88-year-old male patient with history of  stroke and also a right parietal bone osseous lesion seen on CT angio of  the head and neck 07/18/2024.     TECHNIQUE: Axial T1, FLAIR, fat-suppressed T2, axial diffusion and  gradient echo T2, sagittal T1 and postcontrast axial fat-suppressed T1  sagittal and coronal T1 and thin cut 1.5 mm thick axial postcontrast  spoiled gradient echo T1-weighted images were obtained of the entire  head.     This is correlated to a contrast-enhanced CT angiogram of the head and  neck and CT perfusion  study of the brain yesterday on 07/18/2024 at 7:52  p.m.     FINDINGS: There are clustered adjacent patchy nodular foci of diffusion  hyperintensity involving the pre- and postcentral gyri-right superior  frontoparietal region, the combined dimensions of which measured up to  2.7 x 1.7 cm in size compatible with acute superior right frontal  parietal infarct in the right MCA territory. There are 5 separate small  2 to 9 mm diffusion hyperintensities compatible with acute infarcts in  the superior lateral left frontal lobe in the left MCA territory. There  is a tiny 5 mm diffusion hyperintensity in the posterior superior right  occipital cortex compatible with a tiny acute infarct likely in the  right PCA territory. There are scattered tiny nodular foci of T2 high  signal in the cerebral white matter consistent with mild small vessel  disease. There is a 7 x 3 mm old posterior medial right cerebellar  infarct in the right PICA territory, tiny 5 mm old lacunar infarct in  the head of the left caudate nucleus. The remainder of the brain  parenchyma is normal in signal intensity. The ventricles are normal in  size. I see no midline shift. No extra-axial fluid collections are  identified. Following contrast, no abnormal areas of enhancement are  seen in the brain. Reidentified is a lesion involving the superior right  parietal bone that measures 4 x 3.6 cm in mediolateral, anterior  posterior dimension that involves the medullary cavity and remodels the  external table of the superior right parietal bone and maximally  measures 13 mm in cranial caudal dimension. Has some T1 low signal, T2  high signal and increased enhancement within it. There is an  indeterminate osseous lesion. It could represent a benign calvarial  hemangioma.       Impression:      1. There are clusters of small acute infarcts involving the posterior  superior right frontal and anterior superior right parietal cortex  involving both the pre- and  postcentral gyri. The combined dimensions of  the clustered small superior right frontal parietal cortical infarcts  measures up to 2.7 x 1.7 x 2 cm in size and it is in the right MCA  territory. Furthermore, there is an additional cluster of 5 separate 2  to 9 mm acute infarcts involving the superior lateral left frontal  cortex in the left MCA territory and there is a 5 mm nodular acute  infarct involving the mid-to-posterior superior right occipital cortex  in the right PCA territory and the different vascular territories of  these acute infarcts suggests the patient may have a central or cardiac  embolic source.     2. There is mild small vessel disease in cerebral white matter and 7 x 3  mm old posterior medial right cerebellar infarct in right PICA  territory, 5 mm old lacunar infarct in the head of the left caudate  nucleus.     3. Reidentified is an indeterminate T1 hypointense, T2 hyperintense  enhancing calvarial lesion expanding the superior right parietal bone.  It maximally measures 4 x 3.6 cm in mediolateral and anterior posterior  dimension, 13 mm in craniocaudal dimension, extends from the outer  medullary cavity and expands and remodels the external table of the  superior right parietal bone. Its matrix on recent CT yesterday, on  07/18/2024 suggests that it is probably benign. It may be a benign  calvarial hemangioma. I think is unlikely to be an osseous met but an  osseous metastasis is not entirely excluded. A bone scan could be used  to further evaluate the remainder of the skeleton and this lesion.. The  remainder the MRI of the brain is normal. The results were discussed  with Dr. Francis from Stroke Neurology by telephone on 07/19/2024 at 9  p.m.     This report was finalized on 7/20/2024 8:52 AM by Dr. Daniel Sandoval M.D  on Workstation: [id number]       CT Angiogram Head w AI Analysis of LVO [[id number]] Collected: 07/18/24 2138     Updated: 07/19/24 1704    Narrative:      CT ANGIOGRAM NECK  AND HEAD WITH CONTRAST AND CT PERFUSION WITH CONTRAST     HISTORY: Stroke, right arm arm weakness.     COMPARISON: None.     Initially, a noncontrasted CT examination of the brain was performed.  There is no evidence of acute infarction or of intracranial hemorrhage.  There is a calcified lesion involving the right parietal bone  superolaterally, well demarcated with central calcification measuring  approximately 3 cm in the AP dimension, 4 cm in the transverse dimension  and approximately 1 cm in the craniocaudal dimension.     CT PERFUSION: There was no evidence of abnormal perfusion.     CT angiogram of the neck and head with contrast: The great vessels are  arranged in a classic configuration. There is eccentric noncalcified  plaque appreciated involving in the left common carotid artery  proximally. This results in mild stenosis. Atherosclerotic disease is  appreciated involving the carotid bifurcations bilaterally but with 0%  stenosis on the right. On the left there is mild irregularity and mild  stenosis (estimated to be approximately 10 to 15% maximally using NASCET  criteria. Vascular calcification involving the carotid siphons are  appreciated bilaterally without high-grade stenosis. The left A1 segment  is mildly hypoplastic. The proximal aspects of the anterior and middle  cerebral arteries appear otherwise unremarkable.     The right vertebral artery is dominant. The left vertebral artery arises  directly from the aortic arch. The cervical segments are of relatively  uniform caliber. The basilar artery and left posterior cerebral artery  appear unremarkable. There is a fetal origin right posterior cerebral  artery.     Sagittal reconstructions demonstrate reversal of the normal cervical  lordosis. There is moderate to severe loss of disc height at C5-6 and  C6-7. Moderate to severe foraminal stenosis is present on the right at  C5-6 and C6-7 secondary to loss of disc height and  uncovertebral  degenerative disease.       Impression:      There is no evidence of acute infarction, intracranial  hemorrhage or of hydrocephalus. Further evaluation could be performed  with a MRI examination of the brain with and without contrast. A  benign-appearing calcified lesion noted involving the right parietal  bone superolaterally measuring approximately 3 x 4 x 1 cm in size with  benign characteristics. Although nonspecific, this likely represents a  calcified hematoma. Clinical correlation suggested. There is  atherosclerotic disease appreciated involving the carotid bifurcations  bilaterally with 0% stenosis on the right and 10 to 15% stenosis on the  left. There is mild stenosis involving the origin of the left common  carotid artery. There is no evidence of proximal intracranial arterial  high-grade stenosis or occlusion.        AI analysis of LVO was utilized.     Radiation dose reduction techniques were utilized, including automated  exposure control and exposure modulation based on body size.        This report was finalized on 7/19/2024 5:01 PM by Dr. Raymond Rea M.D  on Workstation: [id number]       CT Angiogram Neck [[id number]] Collected: 07/18/24 2138     Updated: 07/19/24 1704    Narrative:      CT ANGIOGRAM NECK AND HEAD WITH CONTRAST AND CT PERFUSION WITH CONTRAST     HISTORY: Stroke, right arm arm weakness.     COMPARISON: None.     Initially, a noncontrasted CT examination of the brain was performed.  There is no evidence of acute infarction or of intracranial hemorrhage.  There is a calcified lesion involving the right parietal bone  superolaterally, well demarcated with central calcification measuring  approximately 3 cm in the AP dimension, 4 cm in the transverse dimension  and approximately 1 cm in the craniocaudal dimension.     CT PERFUSION: There was no evidence of abnormal perfusion.     CT angiogram of the neck and head with contrast: The great vessels are  arranged in a  classic configuration. There is eccentric noncalcified  plaque appreciated involving in the left common carotid artery  proximally. This results in mild stenosis. Atherosclerotic disease is  appreciated involving the carotid bifurcations bilaterally but with 0%  stenosis on the right. On the left there is mild irregularity and mild  stenosis (estimated to be approximately 10 to 15% maximally using NASCET  criteria. Vascular calcification involving the carotid siphons are  appreciated bilaterally without high-grade stenosis. The left A1 segment  is mildly hypoplastic. The proximal aspects of the anterior and middle  cerebral arteries appear otherwise unremarkable.     The right vertebral artery is dominant. The left vertebral artery arises  directly from the aortic arch. The cervical segments are of relatively  uniform caliber. The basilar artery and left posterior cerebral artery  appear unremarkable. There is a fetal origin right posterior cerebral  artery.     Sagittal reconstructions demonstrate reversal of the normal cervical  lordosis. There is moderate to severe loss of disc height at C5-6 and  C6-7. Moderate to severe foraminal stenosis is present on the right at  C5-6 and C6-7 secondary to loss of disc height and uncovertebral  degenerative disease.       Impression:      There is no evidence of acute infarction, intracranial  hemorrhage or of hydrocephalus. Further evaluation could be performed  with a MRI examination of the brain with and without contrast. A  benign-appearing calcified lesion noted involving the right parietal  bone superolaterally measuring approximately 3 x 4 x 1 cm in size with  benign characteristics. Although nonspecific, this likely represents a  calcified hematoma. Clinical correlation suggested. There is  atherosclerotic disease appreciated involving the carotid bifurcations  bilaterally with 0% stenosis on the right and 10 to 15% stenosis on the  left. There is mild stenosis  involving the origin of the left common  carotid artery. There is no evidence of proximal intracranial arterial  high-grade stenosis or occlusion.        AI analysis of LVO was utilized.     Radiation dose reduction techniques were utilized, including automated  exposure control and exposure modulation based on body size.        This report was finalized on 7/19/2024 5:01 PM by Dr. Raymond Rea M.D  on Workstation: [id number]       CT CEREBRAL PERFUSION WITH & WITHOUT CONTRAST [[id number]] Collected: 07/18/24 2138     Updated: 07/19/24 1704    Narrative:      CT ANGIOGRAM NECK AND HEAD WITH CONTRAST AND CT PERFUSION WITH CONTRAST     HISTORY: Stroke, right arm arm weakness.     COMPARISON: None.     Initially, a noncontrasted CT examination of the brain was performed.  There is no evidence of acute infarction or of intracranial hemorrhage.  There is a calcified lesion involving the right parietal bone  superolaterally, well demarcated with central calcification measuring  approximately 3 cm in the AP dimension, 4 cm in the transverse dimension  and approximately 1 cm in the craniocaudal dimension.     CT PERFUSION: There was no evidence of abnormal perfusion.     CT angiogram of the neck and head with contrast: The great vessels are  arranged in a classic configuration. There is eccentric noncalcified  plaque appreciated involving in the left common carotid artery  proximally. This results in mild stenosis. Atherosclerotic disease is  appreciated involving the carotid bifurcations bilaterally but with 0%  stenosis on the right. On the left there is mild irregularity and mild  stenosis (estimated to be approximately 10 to 15% maximally using NASCET  criteria. Vascular calcification involving the carotid siphons are  appreciated bilaterally without high-grade stenosis. The left A1 segment  is mildly hypoplastic. The proximal aspects of the anterior and middle  cerebral arteries appear otherwise unremarkable.      The right vertebral artery is dominant. The left vertebral artery arises  directly from the aortic arch. The cervical segments are of relatively  uniform caliber. The basilar artery and left posterior cerebral artery  appear unremarkable. There is a fetal origin right posterior cerebral  artery.     Sagittal reconstructions demonstrate reversal of the normal cervical  lordosis. There is moderate to severe loss of disc height at C5-6 and  C6-7. Moderate to severe foraminal stenosis is present on the right at  C5-6 and C6-7 secondary to loss of disc height and uncovertebral  degenerative disease.       Impression:      There is no evidence of acute infarction, intracranial  hemorrhage or of hydrocephalus. Further evaluation could be performed  with a MRI examination of the brain with and without contrast. A  benign-appearing calcified lesion noted involving the right parietal  bone superolaterally measuring approximately 3 x 4 x 1 cm in size with  benign characteristics. Although nonspecific, this likely represents a  calcified hematoma. Clinical correlation suggested. There is  atherosclerotic disease appreciated involving the carotid bifurcations  bilaterally with 0% stenosis on the right and 10 to 15% stenosis on the  left. There is mild stenosis involving the origin of the left common  carotid artery. There is no evidence of proximal intracranial arterial  high-grade stenosis or occlusion.        AI analysis of LVO was utilized.     Radiation dose reduction techniques were utilized, including automated  exposure control and exposure modulation based on body size.        This report was finalized on 7/19/2024 5:01 PM by Dr. Raymond Rea M.D  on Workstation: [id number]       CT Chest Without Contrast Diagnostic [[id number]] Collected: 07/18/24 2131     Updated: 07/18/24 2148    Narrative:      CT CHEST WO CONTRAST DIAGNOSTIC-     DATE OF EXAM: 7/18/2024 8:50 PM     INDICATION: Abnormal finding on CT of the neck  in the right hilum of the  lung.     COMPARISON: CTA neck 7/18/2024. Chest radiograph 7/18/2024.     TECHNIQUE: Multiple contiguous axial images were acquired through the  chest without the intravenous administration of contrast. Reformatted  coronal and sagittal sequences were also reviewed. Radiation dose  reduction techniques were utilized, including automated exposure control  and exposure modulation based on body size.     FINDINGS:  Multilobulated solid mass in the right lower lobe measuring 38 mm x 32  mm (axial series 3 image 78), with mild adjacent patchy groundglass  opacities and interstitial thickening. An adjacent sub-6 mm satellite  nodule is seen lateral and inferior to the mass (axial series 3 image  83). Subtle nodular groundglass opacities in the right upper lobe (axial  series 3 image 48), the right middle lobe (axial series 3 image 72), and  the left upper lobe (axial series 3 image 56), likely  infectious/inflammatory. Additional sub-6 mm pulmonary nodule in the  lateral base of the left lower lobe (axial series 3 image 87). Benign  calcified granulomas in the lingula. Mild apical predominant chronic  emphysematous changes in both lungs with biapical pleural-parenchymal  scarring. The central airways are widely patent. No pneumothorax or  pleural effusion.     The heart and great vessels of the chest are normal in size. Moderate  calcified coronary artery disease. No pericardial effusion. No definite  pathologically enlarged intrathoracic lymph nodes are identified,  although evaluation is mildly limited by the lack of intravenous  contrast.     Limited imaging of the upper abdomen is unremarkable.     Large 50 mm x 30 mm cystic mass in the subcutaneous fat immediately deep  to the skin of the posterior upper right chest wall (axial series 2  image 13), possible benign epidermal inclusion cyst. No acute osseous  abnormality or concerning osseous lesion.       Impression:         1.  Multilobulated 38 mm x 32 mm mass in the right lower lobe, probable  primary lung malignancy.  2. Additional sub-6 mm pulmonary nodules in each lower lobe, which are  nonspecific but metastatic disease is not excluded.  3. Scattered groundglass opacities in both lungs, likely  infectious/inflammatory.  4. Moderate calcified coronary artery disease.  5. Large 5 cm x 3 cm cystic mass in the subcutaneous fat immediately  deep to the skin of the posterior upper right chest wall, possible  benign epidermal inclusion cyst.     This report was finalized on 7/18/2024 9:45 PM by Josué Motron MD on  Workstation: [id number]       XR Chest 1 View [[id number]] Collected: 07/18/24 2039     Updated: 07/18/24 2044    Narrative:      XR CHEST 1 VW-     CLINICAL HISTORY:  Acute Stroke Protocol (onset < 12 hrs);  I63.9-Cerebral infarction, unspecified     COMPARISON:  None     FINDINGS: A single view of the chest demonstrates the heart to be within  normal limits in size. There is an area of increased density present  inferior lateral to the right hilum, nonspecific. This may represent a  focal area of infiltrate but is suspicious for an underlying mass. It  measures approximately 4.5 cm in diameter. A CT examination of the chest  is recommended. There is no evidence of effusion or of congestive  failure.     The above information was called and discussed with Dr. Thomas.     This report was finalized on 7/18/2024 8:41 PM by Dr. Raymond Rea M.D  on Workstation: [id number]                 I reviewed the patient's new clinical results.  I personally viewed and interpreted the patient's imaging results:        Medication Review:   aspirin, 325 mg, Oral, Daily   Or  aspirin, 300 mg, Rectal, Daily  atorvastatin, 80 mg, Oral, Nightly  sodium chloride, 10 mL, Intravenous, Once  sodium chloride, 10 mL, Intravenous, Q12H             ASSESSMENT:     Stroke status post TNKase with good neurological recovery    Right lower lobe lung  mass    Borderline type 2 diabetes mellitus    Tobacco abuse    PLAN:  Patient had multiple strokes with concern about a possible embolic process and workup is in progress including a Zio patch after discharge.  The finding on the CT of the chest are concerning for lung cancer, on independent review of the CAT scan patient has few other lesions that needs to be followed, patient needs to have eventually a PET scan after discharge but a lung biopsy is essential to help with further diagnostic testing and future planning.  Patient is on aspirin which is recommended to be continued, which slightly increased risk of bleeding and to have a CT-guided biopsy, interventional radiology recommended to monitor the patient overnight in order to detect and intervene in case of any significant bleeding.  Patient on the other hand was eager to go home and was even considering going AGAINST MEDICAL ADVICE  I had a detailed discussion with the patient about the rationale for the need for hospital stay overnight and the indication and the risk of delaying the diagnosis of a lung cancer that could be potentially cured if detected early.  He will be discussing in further details with his family and he will let me know if he is agreeable to stay overnight in that case patient can have the biopsy today and will discharge home tomorrow morning  if no bleeding occurs.  If biopsy is being done patient is to follow-up with us in a short interval to discuss the biopsy results and further workup.  Will get the physical therapy to assess, patient needs to have bathroom privileges if no restrictions from the PT recommendation, that was discussed with the nursing staff as well  Patient will go home on Lipitor and aspirin and Zio patch  Patient strongly advised to abstain from smoking  Patient was supposed to have CT angiogram of the chest, even though it was ordered stat but the problem was with the CAT scan machine and we will try to get that  done as soon as possible.  He did have elevated D-dimer.  Discussed with patient and family  Discussed with interventional radiology  Discussed with Dr. Kirkpatrick  Neurology note reviewed  Labs/Notes/films were independently reviewed and pertinent results are summarized above  The copied texts in this note were reviewed and they are accurate as of 07/22/24    Disposition: Continue to monitor for 1 more day  Patient is a telemetry overflow and does not require to stay in the CCU for the above procedure    Addendum:  Patient agreed for the procedure, will go and proceed    Maryan Dupree MD  07/22/24  09:04 EDT          Dictated utilizing Dragon dictation

## 2024-07-22 NOTE — PLAN OF CARE
Goal Outcome Evaluation:  Plan of Care Reviewed With: patient        Progress: improving  Outcome Evaluation: Pt seen for PT treatment this AM - pt was evaluated by PT on Friday and reports he is very frustrated he was not allowed out of the bed all weekend. Pt transferred to EOB with SV, STS with SBA, and ambulated 300ft with SBA and no AD. Pt remains unsteady and somewhat impulsive, but educated on safety and verbalized understanding through remains frustrated about being in hospital. Pt with intermittent scissoring especially on turns but able to correct and no overt LOB. Pt returned to room and demo'd independence with toileting. Agreeable to sitting UIC and left with needs met - educated about note getting up unassisted. Safe to get up to bathroom with family assist, but will need to call out for assist from nsg if family not present and pt verbalized understanding. Left pt sitting on chair alarm if needed and d/w RN about mobility recommendations. PT will continue to follow peripherally, anticipate DC home with family assist. Pt's primary deficit at this time is his LUE and OT is following.      Anticipated Discharge Disposition (PT): home with assist

## 2024-07-23 ENCOUNTER — APPOINTMENT (OUTPATIENT)
Dept: CARDIOLOGY | Facility: HOSPITAL | Age: 69
DRG: 062 | End: 2024-07-23
Payer: MEDICARE

## 2024-07-23 ENCOUNTER — READMISSION MANAGEMENT (OUTPATIENT)
Dept: CALL CENTER | Facility: HOSPITAL | Age: 69
End: 2024-07-23
Payer: MEDICARE

## 2024-07-23 ENCOUNTER — NURSE TRIAGE (OUTPATIENT)
Dept: CALL CENTER | Facility: HOSPITAL | Age: 69
End: 2024-07-23
Payer: MEDICARE

## 2024-07-23 VITALS
HEART RATE: 70 BPM | DIASTOLIC BLOOD PRESSURE: 77 MMHG | SYSTOLIC BLOOD PRESSURE: 130 MMHG | TEMPERATURE: 98.2 F | OXYGEN SATURATION: 93 % | RESPIRATION RATE: 15 BRPM | HEIGHT: 73 IN | WEIGHT: 184 LBS | BODY MASS INDEX: 24.39 KG/M2

## 2024-07-23 PROBLEM — I63.9 STROKE: Status: RESOLVED | Noted: 2024-07-18 | Resolved: 2024-07-23

## 2024-07-23 LAB
ALBUMIN SERPL-MCNC: 3.6 G/DL (ref 3.5–5.2)
ALBUMIN/GLOB SERPL: 1.2 G/DL
ALP SERPL-CCNC: 105 U/L (ref 39–117)
ALT SERPL W P-5'-P-CCNC: 9 U/L (ref 1–41)
ANION GAP SERPL CALCULATED.3IONS-SCNC: 8 MMOL/L (ref 5–15)
AST SERPL-CCNC: 34 U/L (ref 1–40)
BILIRUB SERPL-MCNC: 0.7 MG/DL (ref 0–1.2)
BUN SERPL-MCNC: 13 MG/DL (ref 8–23)
BUN/CREAT SERPL: 15.1 (ref 7–25)
CALCIUM SPEC-SCNC: 8.7 MG/DL (ref 8.6–10.5)
CHLORIDE SERPL-SCNC: 104 MMOL/L (ref 98–107)
CO2 SERPL-SCNC: 23 MMOL/L (ref 22–29)
CREAT SERPL-MCNC: 0.86 MG/DL (ref 0.76–1.27)
DEPRECATED RDW RBC AUTO: 43.7 FL (ref 37–54)
EGFRCR SERPLBLD CKD-EPI 2021: 93.7 ML/MIN/1.73
ERYTHROCYTE [DISTWIDTH] IN BLOOD BY AUTOMATED COUNT: 12.8 % (ref 12.3–15.4)
GLOBULIN UR ELPH-MCNC: 2.9 GM/DL
GLUCOSE SERPL-MCNC: 100 MG/DL (ref 65–99)
HCT VFR BLD AUTO: 44.7 % (ref 37.5–51)
HGB BLD-MCNC: 14.8 G/DL (ref 13–17.7)
MAGNESIUM SERPL-MCNC: 2.5 MG/DL (ref 1.6–2.4)
MCH RBC QN AUTO: 31.2 PG (ref 26.6–33)
MCHC RBC AUTO-ENTMCNC: 33.1 G/DL (ref 31.5–35.7)
MCV RBC AUTO: 94.1 FL (ref 79–97)
PHOSPHATE SERPL-MCNC: 3 MG/DL (ref 2.5–4.5)
PLATELET # BLD AUTO: 238 10*3/MM3 (ref 140–450)
PMV BLD AUTO: 10.6 FL (ref 6–12)
POTASSIUM SERPL-SCNC: 4.4 MMOL/L (ref 3.5–5.2)
PROT SERPL-MCNC: 6.5 G/DL (ref 6–8.5)
RBC # BLD AUTO: 4.75 10*6/MM3 (ref 4.14–5.8)
SODIUM SERPL-SCNC: 135 MMOL/L (ref 136–145)
WBC NRBC COR # BLD AUTO: 12.51 10*3/MM3 (ref 3.4–10.8)

## 2024-07-23 PROCEDURE — 93246 EXT ECG>7D<15D RECORDING: CPT

## 2024-07-23 PROCEDURE — 80053 COMPREHEN METABOLIC PANEL: CPT | Performed by: HOSPITALIST

## 2024-07-23 PROCEDURE — 83735 ASSAY OF MAGNESIUM: CPT | Performed by: HOSPITALIST

## 2024-07-23 PROCEDURE — 84100 ASSAY OF PHOSPHORUS: CPT | Performed by: HOSPITALIST

## 2024-07-23 PROCEDURE — 85027 COMPLETE CBC AUTOMATED: CPT | Performed by: HOSPITALIST

## 2024-07-23 RX ORDER — ASPIRIN 325 MG
325 TABLET ORAL DAILY
Qty: 30 TABLET | Refills: 5 | Status: SHIPPED | OUTPATIENT
Start: 2024-07-23 | End: 2025-01-19

## 2024-07-23 RX ORDER — ATORVASTATIN CALCIUM 80 MG/1
80 TABLET, FILM COATED ORAL NIGHTLY
Qty: 90 TABLET | Refills: 0 | Status: SHIPPED | OUTPATIENT
Start: 2024-07-23 | End: 2024-10-21

## 2024-07-23 RX ADMIN — ASPIRIN 325 MG: 325 TABLET ORAL at 07:17

## 2024-07-23 NOTE — CASE MANAGEMENT/SOCIAL WORK
Case Management Discharge Note      Final Note: home    Provided Post Acute Provider List?: Yes  Post Acute Provider List: Outpatient Therapy  Provided Post Acute Provider Quality & Resource List?: N/A  Delivered To: Patient  Method of Delivery: In person    Selected Continued Care - Admitted Since 7/18/2024                   Transportation Services  Private: Car    Final Discharge Disposition Code: 01 - home or self-care

## 2024-07-23 NOTE — OUTREACH NOTE
Prep Survey      Flowsheet Row Responses   [facility] patient discharged from? [place]   Is LACE score < 7 ? No   Eligibility [facility]   Date of Admission 07/18/24   Date of Discharge 07/23/24   Discharge Disposition Home or Self Care   Discharge diagnosis Stroke   Does the patient have one of the following disease processes/diagnoses(primary or secondary)? Stroke   Does the patient have Home health ordered? No   Is there a DME ordered? No   Comments regarding appointments Ambulatory Referral to Physical Therapy/  NEW PCP APPT:  RODNEY GASCA  on 7/29/24 at 10:45 AM.   Medication alerts for this patient see AVS   Prep survey completed? Yes            Gladys DANG - Registered Nurse

## 2024-07-23 NOTE — DISCHARGE SUMMARY
"  DISCHARGE SUMMARY    Patient Name: Roby Ott  Age/Sex: 68 y.o. male  : 1955  MRN: [id number]  Patient Care Team:  Provider, No Known as PCP - General       Date of Admit: 2024  Date of Discharge:  24  Discharge Condition: Stable    Discharge Diagnoses:    Stroke status post TNKase with good neurological recovery    Right lower lobe lung mass    Borderline type 2 diabetes mellitus    Tobacco abuse, 2 ppd   Superior Posterior right  chest wall mass, likely benign cyst   Copd     History of present illness from H&P from 2024:   Roby Ott is a 68 year old male who presented to the ED with acute onset LUE, LLE weakness with numbness that started after dinner tonight.  EMS was called immediately, presented to the ED with flaccid LUE and slight drift LLE with tingling and numbness.  NIH 6.  Per wife's advice patient took an ASA prior to arrival \"in case it was my heart.\"  Was noted to be hypertensive upon arrival, /92.  CT head without hemorrhage, Dr Allen with Neurology was consulted in the ED who reviewed images and recommended TNK administration, given at .  At this time radiologist read of CTA head/neck and CTP are still pending to my view.  CXR with area of increased density lateral to the right hilum which could represent focal area of infiltrate but is suspicious for underlying mass measuring approximately 4.5 cm in diameter.  CBC, CMP, coags all WNL.  EKG noted NSR, no acute changes, borderline QT interval. He will be admitted to ICU for further management.     Upon exam in the ED patient denies any symptoms prior to acute onset weakness.  Was in his usual state of health.  No recent illness or hospitalization.  No chest pain, denies any known cardiac or pulmonary issues, does not take any medications.  When questioned if he has a PCP patient states \"it has been a long time since I've been to the doctor.\"  He is a current cigarette smoker, approximately 2ppd since he was " "14 years old.  He states he had 1 operation \"a long time ago\" which involved what sounds like a cyst on his neck which was removed.  He states his father had history of skin cancer which he believes was caused by the BP medications he took.       Hospital Course:   [facility] of right sided weakness   Patient is 67 y/o male with history of tobacco abuse, lung mass presented to the ED with on 7/18 with acute left sided weakness. Last night around 6:50 pm he had acute left sided weakness/numbness so he decided to come to the ER. CT head did not show hemorrhage or hypodensity and CTA was negative for LVO. TNK was given at 2025. MRI brain showed acute infraction in the right MCA, left frontal and right PCA. TTE done demonstrates 80% EF, normal LAE with no thrombus or PFO. CXR demonstrated lung mass. CT chest showed multi lobulated right lower lobe probable lung malignancy with pulmonary nodules bilateral lower lungs concerning for malignancy. Plan had lung biopsy 7/22/2024 and concerning carcinoma placing patient in hypercoaguable state. D-dimer 0.83, CT angiogram was done and patient was ruled out for any pulmonary embolus . Plan is ton continue patient on ASA/statin and zio patch at discharge. Patient has been smoking 2 PPD for 54.6 years and is eager to quit and discussed smoking cessation with patient and seemed receptive. Patient wife is supportive of him quitting as well  the biopsy was uneventful   He is to follow up in 1 week to discuss the biopsy results     Consults:   IP CONSULT TO NEUROLOGY  IP CONSULT TO NEUROLOGY  IP CONSULT TO PULMONOLOGY  IP CONSULT TO NEURO CLINICAL SPECIALIST  IP CONSULT TO CASE MANAGEMENT   IP CONSULT TO NEUROLOGY  IP CONSULT TO HOSPITALIST  IP CONSULT TO CASE MANAGEMENT   IP CONSULT TO NUTRITION SERVICES    Significant Discharge Diagnostics   Procedures Performed:         Pertinent Lab " Results:  Results from last 7 days   Lab Units 07/22/24  0552 07/21/24  0502 07/20/24  0429 07/19/24  0401 07/18/24 1951 07/18/24 1948   SODIUM mmol/L 134* 135* 138 139  --  139   POTASSIUM mmol/L 4.1 4.0 4.4 3.7  --  3.7   CHLORIDE mmol/L 101 104 104 106  --  103   CO2 mmol/L 21.7* 20.7* 27.1 26.7  --  27.9   BUN mg/dL 12 11 9 8  --  9   CREATININE mg/dL 0.86 0.86 0.87 0.93 1.10 1.03   GLUCOSE mg/dL 108* 92 109* 106*  --  94   CALCIUM mg/dL 8.8 8.3* 8.8 8.8  --  9.1   AST (SGOT) U/L 21  --   --   --   --  17   ALT (SGPT) U/L 8  --   --   --   --  6     Results from last 7 days   Lab Units 07/18/24 1948   HSTROP T ng/L 13     Results from last 7 days   Lab Units 07/22/24  0552 07/21/24  0502 07/20/24  0429 07/19/24 0401 07/18/24 1948   WBC 10*3/mm3 12.25* 11.58* 12.28* 11.15* 10.22   HEMOGLOBIN g/dL 14.6 13.9 14.8 13.8 14.0   HEMATOCRIT % 43.5 41.7 45.2 42.1 42.3   PLATELETS 10*3/mm3 249 214 229 221 226   MCV fL 92.8 94.1 94.6 95.9 94.4   MCH pg 31.1 31.4 31.0 31.4 31.3   MCHC g/dL 33.6 33.3 32.7 32.8 33.1   RDW % 12.4 12.7 12.9 13.0 13.0   RDW-SD fl 41.4 43.8 44.6 46.7 44.3   MPV fL 10.7 10.7 10.1 10.6 10.1   NEUTROPHIL % %  --  60.9 65.6 62.1 55.4   LYMPHOCYTE % %  --  25.1 21.6 25.2 30.7   MONOCYTES % %  --  9.7 8.7 8.7 9.2   EOSINOPHIL % %  --  3.5 3.3 3.3 4.0   BASOPHIL % %  --  0.5 0.3 0.4 0.5   IMM GRAN % %  --  0.3 0.5 0.3 0.2   NEUTROS ABS 10*3/mm3  --  7.05* 8.05* 6.92 5.66   LYMPHS ABS 10*3/mm3  --  2.91 2.65 2.81 3.14*   MONOS ABS 10*3/mm3  --  1.12* 1.07* 0.97* 0.94*   EOS ABS 10*3/mm3  --  0.40 0.41* 0.37 0.41*   BASOS ABS 10*3/mm3  --  0.06 0.04 0.05 0.05   IMMATURE GRANS (ABS) 10*3/mm3  --  0.04 0.06* 0.03 0.02   NRBC /100 WBC  --  0.0 0.0 0.0 0.0     Results from last 7 days   Lab Units 07/21/24  0501 07/18/24  1948   INR  1.10 0.98   APTT seconds  --  26.9     Results from last 7 days   Lab Units 07/22/24  0552   MAGNESIUM mg/dL 2.2     Results from last 7 days   Lab Units 07/19/24  0401    CHOLESTEROL mg/dL 185   TRIGLYCERIDES mg/dL 90   HDL CHOL mg/dL 31*   LDL CHOL mg/dL 137*                                               Imaging Results:  Imaging Results (All)       Procedure Component Value Units Date/Time    XR Chest 1 View [[id number]] Collected: 07/22/24 2123     Updated: 07/22/24 2128    Narrative:      SINGLE VIEW OF THE CHEST     HISTORY: Post lung biopsy     COMPARISON: 12/27/2024     FINDINGS:  Heart size is within normal limits. No obvious pneumothorax is seen.  Nodular density abutting the right hilum is again seen. There is  calcification of the aorta. No acute infiltrates are seen.          Impression:      No pneumothorax identified.     This report was finalized on 7/22/2024 9:25 PM by Dr. Yovana Varela M.D on Workstation: [id number]       CT Angiogram Chest [[id number]] Collected: 07/22/24 1903     Updated: 07/22/24 2033    Narrative:      CT ANGIOGRAM CHEST-     Radiation dose reduction techniques were utilized, including automated  exposure control and exposure modulation based on body size.     CT scan of the chest performed, angiogram/PE protocol include coronal,  sagittal and three-dimensional reconstruction.     COMPARISON CT images earlier in the day at 4:40 p.m. as well as chest CT  from 07/18/2024     FINDINGS:  1. No pulmonary embolus. No aortic aneurysm nor dissection, there is  atherosclerotic plaque formation. Cardiac size within normal limits, no  pericardial effusion. The esophagus is satisfactory in course and  caliber. There is some mural thrombus formation along the aortic arch  which is extending into the origin of the left common carotid artery  with approximately 50% luminal compromise.     2. Biapical bleb formation and scarring. No pneumothorax. Right lower  lobe tumor reidentified. No pleural effusion or acute airspace disease  is demonstrated. Mild bronchial wall thickening and mucous plugging  within the lower lobes. There are other areas of bronchial  wall  thickening involving both lungs. Old granulomatous disease left upper  lobe. Limited images through the upper abdomen are unremarkable. There  is a sizable subcutaneous mass along the right posterior back, seen on  the previous CT, likely sebaceous cyst. The remainder is unremarkable.              This report was finalized on 7/22/2024 8:30 PM by Dr. Dakota Flaherty M.D  on Workstation: [id number]       XR Chest 1 View [[id number]] Collected: 07/22/24 2028     Updated: 07/22/24 2032    Narrative:      XR CHEST 1 VW-     Clinical: Follow-up right lung biopsy     FINDINGS: Right lower lobe tumor is again demonstrated. No pneumothorax  status post lung biopsy. Cardiomediastinal silhouette is stable. No new  area of infiltrate, no effusion seen. Remainder is unremarkable.     This report was finalized on 7/22/2024 8:29 PM by Dr. Dakota Flaherty M.D  on Workstation: [id number]       CT Needle Biopsy Lung [[id number]] Collected: 07/22/24 1748     Updated: 07/22/24 1754    Narrative:      CT GUIDED LUNG BIOPSY     HISTORY:  3.8 cm right lower lobe lung mass; I63.9-Cerebral infarction,  unspecified.     COMPARISON: 7/18/2024 CT chest     FINDINGS:   After informed written consent was obtained with a witness present, the  patient was placed in right decubitus position. A planning CT scan was  performed and an approach chosen. The skin was prepped and draped in the  usual sterile fashion. A nurse was continuously present for monitoring  and to administer moderate sedation from 1649 to 1700 hours under  physician supervision utilizing a total of 1 mg Versed and 50 mcg  fentanyl. 1% lidocaine solution was injected into the soft tissues for  local anesthesia. Under CT guidance, a 19-gauge introducer needle was  advanced into the right lower lobe mass. A total of 2 passes were  performed (partially under CT visualization) using a 20-gauge core  biopsy needle with appropriate appearing tissue specimens placed in  formalin.  The introducer needle was then removed under injection of a  few milliliters of freshly drawn autologous blood to serve as  parenchymal blood patch. Manual pressure was held. A sterile dressing  was applied. The patient tolerated the procedure well. There were no  immediate complications. All elements of maximal sterile technique were  followed. Radiation dose reduction techniques were utilized, including  automated exposure control and exposure modulation based on body size.          Impression:      Successful right lung mass biopsy, as described above. Please see  pathology report for tissue diagnosis.      This report was finalized on 7/22/2024 5:51 PM by Dr. Matt Leblanc M.D  on Workstation: [id number]       MRI Brain With & Without Contrast [[id number]] Collected: 07/20/24 0049     Updated: 07/20/24 0855    Narrative:      MRI OF THE BRAIN WITH AND WITHOUT CONTRAST ON 07/19/2024     CLINICAL HISTORY: This is an 88-year-old male patient with history of  stroke and also a right parietal bone osseous lesion seen on CT angio of  the head and neck 07/18/2024.     TECHNIQUE: Axial T1, FLAIR, fat-suppressed T2, axial diffusion and  gradient echo T2, sagittal T1 and postcontrast axial fat-suppressed T1  sagittal and coronal T1 and thin cut 1.5 mm thick axial postcontrast  spoiled gradient echo T1-weighted images were obtained of the entire  head.     This is correlated to a contrast-enhanced CT angiogram of the head and  neck and CT perfusion study of the brain yesterday on 07/18/2024 at 7:52  p.m.     FINDINGS: There are clustered adjacent patchy nodular foci of diffusion  hyperintensity involving the pre- and postcentral gyri-right superior  frontoparietal region, the combined dimensions of which measured up to  2.7 x 1.7 cm in size compatible with acute superior right frontal  parietal infarct in the right MCA territory. There are 5 separate small  2 to 9 mm diffusion hyperintensities compatible with acute infarcts  in  the superior lateral left frontal lobe in the left MCA territory. There  is a tiny 5 mm diffusion hyperintensity in the posterior superior right  occipital cortex compatible with a tiny acute infarct likely in the  right PCA territory. There are scattered tiny nodular foci of T2 high  signal in the cerebral white matter consistent with mild small vessel  disease. There is a 7 x 3 mm old posterior medial right cerebellar  infarct in the right PICA territory, tiny 5 mm old lacunar infarct in  the head of the left caudate nucleus. The remainder of the brain  parenchyma is normal in signal intensity. The ventricles are normal in  size. I see no midline shift. No extra-axial fluid collections are  identified. Following contrast, no abnormal areas of enhancement are  seen in the brain. Reidentified is a lesion involving the superior right  parietal bone that measures 4 x 3.6 cm in mediolateral, anterior  posterior dimension that involves the medullary cavity and remodels the  external table of the superior right parietal bone and maximally  measures 13 mm in cranial caudal dimension. Has some T1 low signal, T2  high signal and increased enhancement within it. There is an  indeterminate osseous lesion. It could represent a benign calvarial  hemangioma.       Impression:      1. There are clusters of small acute infarcts involving the posterior  superior right frontal and anterior superior right parietal cortex  involving both the pre- and postcentral gyri. The combined dimensions of  the clustered small superior right frontal parietal cortical infarcts  measures up to 2.7 x 1.7 x 2 cm in size and it is in the right MCA  territory. Furthermore, there is an additional cluster of 5 separate 2  to 9 mm acute infarcts involving the superior lateral left frontal  cortex in the left MCA territory and there is a 5 mm nodular acute  infarct involving the mid-to-posterior superior right occipital cortex  in the right PCA  territory and the different vascular territories of  these acute infarcts suggests the patient may have a central or cardiac  embolic source.     2. There is mild small vessel disease in cerebral white matter and 7 x 3  mm old posterior medial right cerebellar infarct in right PICA  territory, 5 mm old lacunar infarct in the head of the left caudate  nucleus.     3. Reidentified is an indeterminate T1 hypointense, T2 hyperintense  enhancing calvarial lesion expanding the superior right parietal bone.  It maximally measures 4 x 3.6 cm in mediolateral and anterior posterior  dimension, 13 mm in craniocaudal dimension, extends from the outer  medullary cavity and expands and remodels the external table of the  superior right parietal bone. Its matrix on recent CT yesterday, on  07/18/2024 suggests that it is probably benign. It may be a benign  calvarial hemangioma. I think is unlikely to be an osseous met but an  osseous metastasis is not entirely excluded. A bone scan could be used  to further evaluate the remainder of the skeleton and this lesion.. The  remainder the MRI of the brain is normal. The results were discussed  with Dr. Francis from Stroke Neurology by telephone on 07/19/2024 at 9  p.m.     This report was finalized on 7/20/2024 8:52 AM by Dr. Daniel Sandoval M.D  on Workstation: [id number]       CT Angiogram Head w AI Analysis of LVO [[id number]] Collected: 07/18/24 2138     Updated: 07/19/24 1704    Narrative:      CT ANGIOGRAM NECK AND HEAD WITH CONTRAST AND CT PERFUSION WITH CONTRAST     HISTORY: Stroke, right arm arm weakness.     COMPARISON: None.     Initially, a noncontrasted CT examination of the brain was performed.  There is no evidence of acute infarction or of intracranial hemorrhage.  There is a calcified lesion involving the right parietal bone  superolaterally, well demarcated with central calcification measuring  approximately 3 cm in the AP dimension, 4 cm in the transverse dimension  and  approximately 1 cm in the craniocaudal dimension.     CT PERFUSION: There was no evidence of abnormal perfusion.     CT angiogram of the neck and head with contrast: The great vessels are  arranged in a classic configuration. There is eccentric noncalcified  plaque appreciated involving in the left common carotid artery  proximally. This results in mild stenosis. Atherosclerotic disease is  appreciated involving the carotid bifurcations bilaterally but with 0%  stenosis on the right. On the left there is mild irregularity and mild  stenosis (estimated to be approximately 10 to 15% maximally using NASCET  criteria. Vascular calcification involving the carotid siphons are  appreciated bilaterally without high-grade stenosis. The left A1 segment  is mildly hypoplastic. The proximal aspects of the anterior and middle  cerebral arteries appear otherwise unremarkable.     The right vertebral artery is dominant. The left vertebral artery arises  directly from the aortic arch. The cervical segments are of relatively  uniform caliber. The basilar artery and left posterior cerebral artery  appear unremarkable. There is a fetal origin right posterior cerebral  artery.     Sagittal reconstructions demonstrate reversal of the normal cervical  lordosis. There is moderate to severe loss of disc height at C5-6 and  C6-7. Moderate to severe foraminal stenosis is present on the right at  C5-6 and C6-7 secondary to loss of disc height and uncovertebral  degenerative disease.       Impression:      There is no evidence of acute infarction, intracranial  hemorrhage or of hydrocephalus. Further evaluation could be performed  with a MRI examination of the brain with and without contrast. A  benign-appearing calcified lesion noted involving the right parietal  bone superolaterally measuring approximately 3 x 4 x 1 cm in size with  benign characteristics. Although nonspecific, this likely represents a  calcified hematoma. Clinical  correlation suggested. There is  atherosclerotic disease appreciated involving the carotid bifurcations  bilaterally with 0% stenosis on the right and 10 to 15% stenosis on the  left. There is mild stenosis involving the origin of the left common  carotid artery. There is no evidence of proximal intracranial arterial  high-grade stenosis or occlusion.        AI analysis of LVO was utilized.     Radiation dose reduction techniques were utilized, including automated  exposure control and exposure modulation based on body size.        This report was finalized on 7/19/2024 5:01 PM by Dr. Raymond Rea M.D  on Workstation: [id number]       CT Angiogram Neck [[id number]] Collected: 07/18/24 2138     Updated: 07/19/24 1704    Narrative:      CT ANGIOGRAM NECK AND HEAD WITH CONTRAST AND CT PERFUSION WITH CONTRAST     HISTORY: Stroke, right arm arm weakness.     COMPARISON: None.     Initially, a noncontrasted CT examination of the brain was performed.  There is no evidence of acute infarction or of intracranial hemorrhage.  There is a calcified lesion involving the right parietal bone  superolaterally, well demarcated with central calcification measuring  approximately 3 cm in the AP dimension, 4 cm in the transverse dimension  and approximately 1 cm in the craniocaudal dimension.     CT PERFUSION: There was no evidence of abnormal perfusion.     CT angiogram of the neck and head with contrast: The great vessels are  arranged in a classic configuration. There is eccentric noncalcified  plaque appreciated involving in the left common carotid artery  proximally. This results in mild stenosis. Atherosclerotic disease is  appreciated involving the carotid bifurcations bilaterally but with 0%  stenosis on the right. On the left there is mild irregularity and mild  stenosis (estimated to be approximately 10 to 15% maximally using NASCET  criteria. Vascular calcification involving the carotid siphons are  appreciated  bilaterally without high-grade stenosis. The left A1 segment  is mildly hypoplastic. The proximal aspects of the anterior and middle  cerebral arteries appear otherwise unremarkable.     The right vertebral artery is dominant. The left vertebral artery arises  directly from the aortic arch. The cervical segments are of relatively  uniform caliber. The basilar artery and left posterior cerebral artery  appear unremarkable. There is a fetal origin right posterior cerebral  artery.     Sagittal reconstructions demonstrate reversal of the normal cervical  lordosis. There is moderate to severe loss of disc height at C5-6 and  C6-7. Moderate to severe foraminal stenosis is present on the right at  C5-6 and C6-7 secondary to loss of disc height and uncovertebral  degenerative disease.       Impression:      There is no evidence of acute infarction, intracranial  hemorrhage or of hydrocephalus. Further evaluation could be performed  with a MRI examination of the brain with and without contrast. A  benign-appearing calcified lesion noted involving the right parietal  bone superolaterally measuring approximately 3 x 4 x 1 cm in size with  benign characteristics. Although nonspecific, this likely represents a  calcified hematoma. Clinical correlation suggested. There is  atherosclerotic disease appreciated involving the carotid bifurcations  bilaterally with 0% stenosis on the right and 10 to 15% stenosis on the  left. There is mild stenosis involving the origin of the left common  carotid artery. There is no evidence of proximal intracranial arterial  high-grade stenosis or occlusion.        AI analysis of LVO was utilized.     Radiation dose reduction techniques were utilized, including automated  exposure control and exposure modulation based on body size.        This report was finalized on 7/19/2024 5:01 PM by Dr. Raymond Rea M.D  on Workstation: [id number]       CT CEREBRAL PERFUSION WITH & WITHOUT CONTRAST  [[id number]] Collected: 07/18/24 2138     Updated: 07/19/24 1704    Narrative:      CT ANGIOGRAM NECK AND HEAD WITH CONTRAST AND CT PERFUSION WITH CONTRAST     HISTORY: Stroke, right arm arm weakness.     COMPARISON: None.     Initially, a noncontrasted CT examination of the brain was performed.  There is no evidence of acute infarction or of intracranial hemorrhage.  There is a calcified lesion involving the right parietal bone  superolaterally, well demarcated with central calcification measuring  approximately 3 cm in the AP dimension, 4 cm in the transverse dimension  and approximately 1 cm in the craniocaudal dimension.     CT PERFUSION: There was no evidence of abnormal perfusion.     CT angiogram of the neck and head with contrast: The great vessels are  arranged in a classic configuration. There is eccentric noncalcified  plaque appreciated involving in the left common carotid artery  proximally. This results in mild stenosis. Atherosclerotic disease is  appreciated involving the carotid bifurcations bilaterally but with 0%  stenosis on the right. On the left there is mild irregularity and mild  stenosis (estimated to be approximately 10 to 15% maximally using NASCET  criteria. Vascular calcification involving the carotid siphons are  appreciated bilaterally without high-grade stenosis. The left A1 segment  is mildly hypoplastic. The proximal aspects of the anterior and middle  cerebral arteries appear otherwise unremarkable.     The right vertebral artery is dominant. The left vertebral artery arises  directly from the aortic arch. The cervical segments are of relatively  uniform caliber. The basilar artery and left posterior cerebral artery  appear unremarkable. There is a fetal origin right posterior cerebral  artery.     Sagittal reconstructions demonstrate reversal of the normal cervical  lordosis. There is moderate to severe loss of disc height at C5-6 and  C6-7. Moderate to severe foraminal stenosis is  present on the right at  C5-6 and C6-7 secondary to loss of disc height and uncovertebral  degenerative disease.       Impression:      There is no evidence of acute infarction, intracranial  hemorrhage or of hydrocephalus. Further evaluation could be performed  with a MRI examination of the brain with and without contrast. A  benign-appearing calcified lesion noted involving the right parietal  bone superolaterally measuring approximately 3 x 4 x 1 cm in size with  benign characteristics. Although nonspecific, this likely represents a  calcified hematoma. Clinical correlation suggested. There is  atherosclerotic disease appreciated involving the carotid bifurcations  bilaterally with 0% stenosis on the right and 10 to 15% stenosis on the  left. There is mild stenosis involving the origin of the left common  carotid artery. There is no evidence of proximal intracranial arterial  high-grade stenosis or occlusion.        AI analysis of LVO was utilized.     Radiation dose reduction techniques were utilized, including automated  exposure control and exposure modulation based on body size.        This report was finalized on 7/19/2024 5:01 PM by Dr. Raymond Rea M.D  on Workstation: [id number]       CT Chest Without Contrast Diagnostic [[id number]] Collected: 07/18/24 2131     Updated: 07/18/24 2148    Narrative:      CT CHEST WO CONTRAST DIAGNOSTIC-     DATE OF EXAM: 7/18/2024 8:50 PM     INDICATION: Abnormal finding on CT of the neck in the right hilum of the  lung.     COMPARISON: CTA neck 7/18/2024. Chest radiograph 7/18/2024.     TECHNIQUE: Multiple contiguous axial images were acquired through the  chest without the intravenous administration of contrast. Reformatted  coronal and sagittal sequences were also reviewed. Radiation dose  reduction techniques were utilized, including automated exposure control  and exposure modulation based on body size.     FINDINGS:  Multilobulated solid mass in the right lower  lobe measuring 38 mm x 32  mm (axial series 3 image 78), with mild adjacent patchy groundglass  opacities and interstitial thickening. An adjacent sub-6 mm satellite  nodule is seen lateral and inferior to the mass (axial series 3 image  83). Subtle nodular groundglass opacities in the right upper lobe (axial  series 3 image 48), the right middle lobe (axial series 3 image 72), and  the left upper lobe (axial series 3 image 56), likely  infectious/inflammatory. Additional sub-6 mm pulmonary nodule in the  lateral base of the left lower lobe (axial series 3 image 87). Benign  calcified granulomas in the lingula. Mild apical predominant chronic  emphysematous changes in both lungs with biapical pleural-parenchymal  scarring. The central airways are widely patent. No pneumothorax or  pleural effusion.     The heart and great vessels of the chest are normal in size. Moderate  calcified coronary artery disease. No pericardial effusion. No definite  pathologically enlarged intrathoracic lymph nodes are identified,  although evaluation is mildly limited by the lack of intravenous  contrast.     Limited imaging of the upper abdomen is unremarkable.     Large 50 mm x 30 mm cystic mass in the subcutaneous fat immediately deep  to the skin of the posterior upper right chest wall (axial series 2  image 13), possible benign epidermal inclusion cyst. No acute osseous  abnormality or concerning osseous lesion.       Impression:         1. Multilobulated 38 mm x 32 mm mass in the right lower lobe, probable  primary lung malignancy.  2. Additional sub-6 mm pulmonary nodules in each lower lobe, which are  nonspecific but metastatic disease is not excluded.  3. Scattered groundglass opacities in both lungs, likely  infectious/inflammatory.  4. Moderate calcified coronary artery disease.  5. Large 5 cm x 3 cm cystic mass in the subcutaneous fat immediately  deep to the skin of the posterior upper right chest wall, possible  benign  epidermal inclusion cyst.     This report was finalized on 7/18/2024 9:45 PM by Josué Morton MD on  Workstation: [id number]       XR Chest 1 View [[id number]] Collected: 07/18/24 2039     Updated: 07/18/24 2044    Narrative:      XR CHEST 1 VW-     CLINICAL HISTORY:  Acute Stroke Protocol (onset < 12 hrs);  I63.9-Cerebral infarction, unspecified     COMPARISON:  None     FINDINGS: A single view of the chest demonstrates the heart to be within  normal limits in size. There is an area of increased density present  inferior lateral to the right hilum, nonspecific. This may represent a  focal area of infiltrate but is suspicious for an underlying mass. It  measures approximately 4.5 cm in diameter. A CT examination of the chest  is recommended. There is no evidence of effusion or of congestive  failure.     The above information was called and discussed with Dr. Thomas.     This report was finalized on 7/18/2024 8:41 PM by Dr. Raymond Rea M.D  on Workstation: [id number]               Objective:   Temp:  [98 °F (36.7 °C)-98.8 °F (37.1 °C)] 98.8 °F (37.1 °C)  Heart Rate:  [59-80] 62  Resp:  [13-20] 18  BP: ()/(49-74) 127/72   SpO2:  [89 %-98 %] 91 %  on  Flow (L/min):  [2] 2 Device (Oxygen Therapy): room air  No intake or output data in the 24 hours ending 07/23/24 0728  Body mass index is 24.28 kg/m².      07/18/24 1945 07/19/24  1911   Weight: 83.7 kg (184 lb 8.4 oz) 83.5 kg (184 lb)     Weight change:     Physical Exam:      General: Alert, cooperative, in no acute distress.         HEENT:  No oral lesions. No thrush. Oral mucosa moist.   Chest Wall:  No abnormalities observed.             Neck:  Trachea midline. No JVD.   Pulmonary:  CTAB. No wheezes. Respirations regular, even and unlabored.          Cardio:  Regular rhythm and normal rate. Normal S1 and S2. No murmur, gallop, rub or click.    Abdominal:  Soft, non-tender and non-distended. Normal bowel sounds. No masses. No organomegaly. No guarding.  No rebound tenderness.  Extremities:  Moves all extremities well. No cyanosis. No redness. No edema.     Discharge Medications and Instructions:     Discharge Medications     Discharge Medications        New Medications        Instructions Start Date   aspirin 325 MG tablet   325 mg, Oral, Daily      atorvastatin 80 MG tablet  Commonly known as: LIPITOR   80 mg, Oral, Nightly               Discharge Diet:    Dietary Orders (From admission, onward)       Start     Ordered    07/22/24 0535  Diet: Cardiac; Healthy Heart (2-3 Na+); Texture: Regular (IDDSI 7); Fluid Consistency: Thin (IDDSI 0)  Diet Effective Now        References:    Diet Order [facility]   Question Answer Comment   Diets: Cardiac    Cardiac Diet: Healthy Heart (2-3 Na+)    Texture: Regular (IDDSI 7)    Fluid Consistency: Thin (IDDSI 0)        07/22/24 0534                    Activity at Discharge:   as tolerated     Discharge disposition: Home    Discharge Instructions and Follow ups:     Additional Instructions for the Follow-ups that You Need to Schedule       Ambulatory Referral to Physical Therapy   As directed      Specialty needed: Evaluate and treat Neuro   Weight Bearing Status: Full weight bearing   Follow-up needed: Yes               Follow-up Information       Provider, No Known .    Contact information:  [facility] [address] [phone]                           Future Appointments   Date Time Provider Department Center   7/29/2024 10:45 AM Berta Hathaway, [facility] [place]        Medication Reconciliation: Please see electronically completed Med Rec.    Total time spent discharging patient including evaluation, medication reconciliation, arranging follow up, and post hospitalization instructions and education total time exceeds 30 minutes.     Maryan Dupree MD  07/23/24  07:28 EDT      Dictated utilizing Dragon dictation

## 2024-07-24 ENCOUNTER — TRANSITIONAL CARE MANAGEMENT TELEPHONE ENCOUNTER (OUTPATIENT)
Dept: CALL CENTER | Facility: HOSPITAL | Age: 69
End: 2024-07-24
Payer: MEDICARE

## 2024-07-24 ENCOUNTER — TELEPHONE (OUTPATIENT)
Dept: NEUROLOGY | Facility: OTHER | Age: 69
End: 2024-07-24

## 2024-07-24 LAB
LAB AP CASE REPORT: NORMAL
LAB AP CLINICAL INFORMATION: NORMAL
LAB AP DIAGNOSIS COMMENT: NORMAL
LAB AP INTRADEPARTMENTAL CONSULT: NORMAL
LAB AP SPECIAL STAINS: NORMAL
PATH REPORT.FINAL DX SPEC: NORMAL
PATH REPORT.GROSS SPEC: NORMAL

## 2024-07-24 NOTE — OUTREACH NOTE
Call Center TCM Note      Flowsheet Row Responses   [facility] patient discharged from? [place]   Does the patient have one of the following disease processes/diagnoses(primary or secondary)? Stroke   TCM attempt successful? No   Unsuccessful attempts Attempt 2            Rita Ambrosio LPN    7/24/2024, 17:43 EDT

## 2024-07-24 NOTE — OUTREACH NOTE
Call Center TCM Note      Flowsheet Row Responses   [facility] patient discharged from? [place]   Does the patient have one of the following disease processes/diagnoses(primary or secondary)? Stroke   TCM attempt successful? No   Unsuccessful attempts Attempt 1            Rita Ambrosio LPN    7/24/2024, 10:55 EDT

## 2024-07-24 NOTE — TELEPHONE ENCOUNTER
Caller: Roby Ott    Relationship to patient: Self    Best call back number: [phone]    New or established patient?  [x] New  [] Established    Date of discharge: 7-23-24    Facility discharged from: [facility]    Diagnosis/Symptoms: STROKE    Length of stay (If applicable): 5 DAYS    Specialty Only: Did you see a [facility] provider?    [x] Yes  [] No  If so, who? Jatin Francis MD

## 2024-07-24 NOTE — TELEPHONE ENCOUNTER
Caller states not able to get Lipitor at Pharmacy and they state will have tomorrow is that ok? Last dose was given last night in hospital. Pt with recent stroke and no complaints at this time. Caller states Mr. Ott just upset they don't have medication tonight. Caller states she will go get as soon as open in the morning and he has had his ASA today. Caller advised per guideline. Caller asking if can go up to ER and get medication? Caller states Mr. Ott still upset and she has number for MD on call.          Reason for Disposition   Caller has medicine question only, adult not sick, AND triager answers question    Additional Information   Negative: [1] Intentional drug overdose AND [2] suicidal thoughts or ideas   Negative: Drug overdose and triager unable to answer question   Negative: Caller requesting a renewal or refill of a medicine patient is currently taking   Negative: Caller requesting information unrelated to medicine   Negative: Caller requesting information about COVID-19 Vaccine   Negative: Caller requesting information about Emergency Contraception   Negative: Caller requesting information about Combined Birth Control Pills   Negative: Caller requesting information about Progestin Birth Control Pills   Negative: Caller requesting information about Post-Op pain or medicines   Negative: Caller requesting a prescription antibiotic (such as Penicillin) for Strep throat and has a positive culture result   Negative: Caller requesting a prescription anti-viral med (such as Tamiflu) and has influenza (flu) symptoms   Negative: Immunization reaction suspected   Negative: Rash while taking a medicine or within 3 days of stopping it   Negative: [1] Asthma and [2] having symptoms of asthma (cough, wheezing, etc.)   Negative: [1] Symptom of illness (e.g., headache, abdominal pain, earache, vomiting) AND [2] more than mild   Negative: Breastfeeding questions about mother's medicines and diet   Negative: MORE  THAN A DOUBLE DOSE of a prescription or over-the-counter (OTC) drug   Negative: [1] DOUBLE DOSE (an extra dose or lesser amount) of prescription drug AND [2] any symptoms (e.g., dizziness, nausea, pain, sleepiness)   Negative: [1] DOUBLE DOSE (an extra dose or lesser amount) of over-the-counter (OTC) drug AND [2] any symptoms (e.g., dizziness, nausea, pain, sleepiness)   Negative: Took another person's prescription drug   Negative: [1] DOUBLE DOSE (an extra dose or lesser amount) of prescription drug AND [2] NO symptoms  (Exception: A double dose of antibiotics.)   Negative: Diabetes drug error or overdose (e.g., took wrong type of insulin or took extra dose)   Negative: [1] Prescription not at pharmacy AND [2] was prescribed by PCP recently (Exception: Triager has access to EMR and prescription is recorded there. Go to Home Care and confirm for pharmacy.)   Negative: [1] Pharmacy calling with prescription question AND [2] triager unable to answer question   Negative: [1] Caller has URGENT medicine question about med that PCP or specialist prescribed AND [2] triager unable to answer question   Negative: Medicine patch causing local rash or itching   Negative: [1] Caller has medicine question about med NOT prescribed by PCP AND [2] triager unable to answer question (e.g., compatibility with other med, storage)   Negative: Prescription request for new medicine (not a refill)   Negative: [1] Caller has NON-URGENT medicine question about med that PCP prescribed AND [2] triager unable to answer question   Negative: Caller wants to use a complementary or alternative medicine   Negative: [1] Prescription prescribed recently is not at pharmacy AND [2] triager has access to patient's EMR AND [3] prescription is recorded in the EMR   Negative: [1] DOUBLE DOSE (an extra dose or lesser amount) of over-the-counter (OTC) drug AND [2] NO symptoms   Negative: [1] DOUBLE DOSE (an extra dose or lesser amount) of antibiotic drug AND  "[2] NO symptoms    Answer Assessment - Initial Assessment Questions  1. NAME of MEDICINE: \"What medicine(s) are you calling about?\"      Lipitor   2. QUESTION: \"What is your question?\" (e.g., double dose of medicine, side effect)      Pharmacy out said they'd have tomorrow   3. PRESCRIBER: \"Who prescribed the medicine?\" Reason: if prescribed by specialist, call should be referred to that group.      Was just in hospital for a stroke   4. SYMPTOMS: \"Do you have any symptoms?\" If Yes, ask: \"What symptoms are you having?\"  \"How bad are the symptoms (e.g., mild, moderate, severe)      No c/o   5. PREGNANCY:  \"Is there any chance that you are pregnant?\" \"When was your last menstrual period?\"    Protocols used: Medication Question Call-ADULT-    "

## 2024-07-24 NOTE — TELEPHONE ENCOUNTER
Caller upset that pharmacy did not have prescribed Lipitor available this PM. Advised caller that missing one dose would be ok and per caller pharmacy stated to spouse that they would have medication available tomorrow. Verbalizes understanding.         Reason for Disposition   Caller has medicine question only, adult not sick, AND triager answers question    Additional Information   Negative: [1] Intentional drug overdose AND [2] suicidal thoughts or ideas   Negative: Drug overdose and triager unable to answer question   Negative: Caller requesting a renewal or refill of a medicine patient is currently taking   Negative: Caller requesting information unrelated to medicine   Negative: Caller requesting information about COVID-19 Vaccine   Negative: Caller requesting information about Emergency Contraception   Negative: Caller requesting information about Combined Birth Control Pills   Negative: Caller requesting information about Progestin Birth Control Pills   Negative: Caller requesting information about Post-Op pain or medicines   Negative: Caller requesting a prescription antibiotic (such as Penicillin) for Strep throat and has a positive culture result   Negative: Caller requesting a prescription anti-viral med (such as Tamiflu) and has influenza (flu) symptoms   Negative: Immunization reaction suspected   Negative: Rash while taking a medicine or within 3 days of stopping it   Negative: [1] Asthma and [2] having symptoms of asthma (cough, wheezing, etc.)   Negative: [1] Symptom of illness (e.g., headache, abdominal pain, earache, vomiting) AND [2] more than mild   Negative: Breastfeeding questions about mother's medicines and diet   Negative: MORE THAN A DOUBLE DOSE of a prescription or over-the-counter (OTC) drug   Negative: [1] DOUBLE DOSE (an extra dose or lesser amount) of prescription drug AND [2] any symptoms (e.g., dizziness, nausea, pain, sleepiness)   Negative: [1] DOUBLE DOSE (an extra dose or lesser  amount) of over-the-counter (OTC) drug AND [2] any symptoms (e.g., dizziness, nausea, pain, sleepiness)   Negative: Took another person's prescription drug   Negative: [1] DOUBLE DOSE (an extra dose or lesser amount) of prescription drug AND [2] NO symptoms  (Exception: A double dose of antibiotics.)   Negative: Diabetes drug error or overdose (e.g., took wrong type of insulin or took extra dose)   Negative: [1] Prescription not at pharmacy AND [2] was prescribed by PCP recently (Exception: Triager has access to EMR and prescription is recorded there. Go to Home Care and confirm for pharmacy.)   Negative: [1] Pharmacy calling with prescription question AND [2] triager unable to answer question   Negative: [1] Caller has URGENT medicine question about med that PCP or specialist prescribed AND [2] triager unable to answer question   Negative: Medicine patch causing local rash or itching   Negative: [1] Caller has medicine question about med NOT prescribed by PCP AND [2] triager unable to answer question (e.g., compatibility with other med, storage)   Negative: Prescription request for new medicine (not a refill)   Negative: [1] Caller has NON-URGENT medicine question about med that PCP prescribed AND [2] triager unable to answer question   Negative: Caller wants to use a complementary or alternative medicine   Negative: [1] Prescription prescribed recently is not at pharmacy AND [2] triager has access to patient's EMR AND [3] prescription is recorded in the EMR   Negative: [1] DOUBLE DOSE (an extra dose or lesser amount) of over-the-counter (OTC) drug AND [2] NO symptoms   Negative: [1] DOUBLE DOSE (an extra dose or lesser amount) of antibiotic drug AND [2] NO symptoms    Protocols used: [facility]ication [facility]

## 2024-07-25 ENCOUNTER — TRANSITIONAL CARE MANAGEMENT TELEPHONE ENCOUNTER (OUTPATIENT)
Dept: CALL CENTER | Facility: HOSPITAL | Age: 69
End: 2024-07-25
Payer: MEDICARE

## 2024-07-25 NOTE — OUTREACH NOTE
Call Center TCM Note      Flowsheet Row Responses   [facility] facility patient discharged from? [place]   Does the patient have one of the following disease processes/diagnoses(primary or secondary)? Stroke   TCM attempt successful? No   Unsuccessful attempts Attempt 3            Christie Mcleod RN    7/25/2024, 15:13 EDT

## 2024-07-25 NOTE — PROGRESS NOTES
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     Patient: Roby Ott        : 1955  Account: [id number]           Admit Date: 2024        How to Respond to this query:       a. Click New Note     b. Answer query within the yellow box.                c. Update the Problem List, if applicable.      If you have any questions about this query contact me at: [email or web address]     Dr. Dupree,     Based on inpatient coding guidelines, [facility] are not allowed to use diagnoses from pathology reports as the sole basis for billing.  Any findings noted on a pathology report must be affirmed by the treating physician before billing.     The pathologist reported that the specimen shows \"Clinical information: 3.8 cm right lower lobe lung mass and right lung, CT-guided core biopsy: positive for minimally represented carcinoma with extensive necrosis consistent with lung primary adenocarcinoma.\"    Patient was treated with CT guided lung biopsy.     Is this finding consistent with your clinical impression and treatment plan for this patient?    - Yes, right lower lobe positive for minimally represented carcinoma with extensive necrosis consistent with lung primary adenocarcinoma  - No, please specify __________  - Other explanation for clinical impression and treatment plan _________  - Unable to determine    By submitting this query, we are merely seeking further clarification of documentation to accurately reflect all conditions that you are monitoring, evaluating, treating or that extend the hospitalization or utilize additional resources of care. Please utilize your independent clinical judgment when addressing the question(s) above.     This query and your response, once completed, will be entered into " the legal medical record.    Sincerely,  Eddie Mosher RN   Clinical Documentation Integrity Program

## 2024-07-29 ENCOUNTER — OFFICE VISIT (OUTPATIENT)
Dept: FAMILY MEDICINE CLINIC | Facility: CLINIC | Age: 69
End: 2024-07-29
Payer: MEDICARE

## 2024-07-29 VITALS
HEIGHT: 73 IN | WEIGHT: 178.5 LBS | BODY MASS INDEX: 23.66 KG/M2 | HEART RATE: 104 BPM | DIASTOLIC BLOOD PRESSURE: 80 MMHG | SYSTOLIC BLOOD PRESSURE: 130 MMHG | OXYGEN SATURATION: 98 %

## 2024-07-29 DIAGNOSIS — I63.9 CEREBROVASCULAR ACCIDENT (CVA), UNSPECIFIED MECHANISM: Primary | ICD-10-CM

## 2024-07-29 DIAGNOSIS — Z76.89 ESTABLISHING CARE WITH NEW DOCTOR, ENCOUNTER FOR: ICD-10-CM

## 2024-07-29 DIAGNOSIS — R73.03 BORDERLINE TYPE 2 DIABETES MELLITUS: ICD-10-CM

## 2024-07-29 PROCEDURE — 1159F MED LIST DOCD IN RCRD: CPT | Performed by: NURSE PRACTITIONER

## 2024-07-29 PROCEDURE — 99495 TRANSJ CARE MGMT MOD F2F 14D: CPT | Performed by: NURSE PRACTITIONER

## 2024-07-29 PROCEDURE — 1160F RVW MEDS BY RX/DR IN RCRD: CPT | Performed by: NURSE PRACTITIONER

## 2024-07-29 NOTE — PROGRESS NOTES
"Chief Complaint  Cerebrovascular Accident (New patient, hospital follow up)    Subjective        Roby Ott presents to [facility]  History of Present Illness  Mr. Ott presents to [facility].  He was recently at [facility] [place] and was discharged from the hospital on 7/25/24.. He had left arm numbness and tingling and was diagnosed with a stroke.  The numbness in his left arm has since resolved. He did not have issues with his speech or swallowing, and his lower extremities were not affected.       I have reviewed the patient's medical history in detail and updated the computerized patient record.       Current Outpatient Medications:     aspirin 325 MG tablet, Take 1 tablet by mouth Daily for 180 days., Disp: 30 tablet, Rfl: 5    atorvastatin (LIPITOR) 80 MG tablet, Take 1 tablet by mouth Every Night for 90 days., Disp: 90 tablet, Rfl: 0       Objective   Vital Signs:  /80 (BP Location: Left arm, Patient Position: Sitting, Cuff Size: Adult)   Pulse 104   Ht 185.4 cm (72.99\")   Wt 81 kg (178 lb 8 oz)   SpO2 98%   BMI 23.56 kg/m²   Estimated body mass index is 23.56 kg/m² as calculated from the following:    Height as of this encounter: 185.4 cm (72.99\").    Weight as of this encounter: 81 kg (178 lb 8 oz).       BMI is within normal parameters. No other follow-up for BMI required.      Physical Exam  Vitals reviewed.   Neck:      Vascular: No carotid bruit.   Cardiovascular:      Rate and Rhythm: Normal rate and regular rhythm.      Pulses: Normal pulses.      Heart sounds: Normal heart sounds.   Pulmonary:      Effort: Pulmonary effort is normal.      Breath sounds: Normal breath sounds.   Musculoskeletal:      Cervical back: Normal range of motion and neck supple.   Skin:     General: Skin is warm and dry.      Coloration: Skin is pale.   Neurological:      Mental Status: He is alert and oriented to person, place, and time.   Psychiatric:      Comments: No acute " "distress        Result Review :        /Data reviewed : Recent hospitalization notes 7/24/24    Vitals:    07/29/24 1027 07/29/24 1054   BP: 138/98 130/80   BP Location: Left arm Left arm   Patient Position: Sitting Sitting   Cuff Size: Adult Adult   Pulse: 104    SpO2: 98%    Weight: 81 kg (178 lb 8 oz)    Height: 185.4 cm (72.99\")              Assessment and Plan     Diagnoses and all orders for this visit:    1. Cerebrovascular accident (CVA), unspecified mechanism (Primary)    2. Establishing care with new doctor, encounter for    Mr. Ott presents to establish care.  He does not appear to be in any acute distress.  I have reviewed and reconciled his medications with him today. He is to continue all medications as prescribed.  His blood pressure today in the office was 138/98 and 130/80.   He is to follow up as needed and in 6 months for an annual physical.         Follow Up     Return in about 6 months (around 1/29/2025), or if symptoms worsen or fail to improve, for Medicare Wellness.  Patient was given instructions and counseling regarding his condition or for health maintenance advice. Please see specific information pulled into the AVS if appropriate.         "

## 2024-08-01 ENCOUNTER — PATIENT ROUNDING (BHMG ONLY) (OUTPATIENT)
Dept: FAMILY MEDICINE CLINIC | Facility: CLINIC | Age: 69
End: 2024-08-01
Payer: MEDICARE

## 2024-08-01 ENCOUNTER — TRANSCRIBE ORDERS (OUTPATIENT)
Dept: ADMINISTRATIVE | Facility: HOSPITAL | Age: 69
End: 2024-08-01
Payer: MEDICARE

## 2024-08-01 DIAGNOSIS — R91.8 PULMONARY NODULES: Primary | ICD-10-CM

## 2024-08-01 DIAGNOSIS — C80.1 ADENOCARCINOMA: ICD-10-CM

## 2024-08-01 NOTE — PROGRESS NOTES
Rounded with & welcomed patient during rooming, check in/out.  Patient will follow up at next scheduled office visit.    
cholelithiasis

## 2024-08-12 ENCOUNTER — HOSPITAL ENCOUNTER (OUTPATIENT)
Dept: PET IMAGING | Facility: HOSPITAL | Age: 69
Discharge: HOME | End: 2024-08-12
Payer: MEDICARE

## 2024-08-12 DIAGNOSIS — C80.1 ADENOCARCINOMA: ICD-10-CM

## 2024-08-12 DIAGNOSIS — I48.91 ATRIAL FIBRILLATION, UNSPECIFIED TYPE: Primary | ICD-10-CM

## 2024-08-12 DIAGNOSIS — R91.8 PULMONARY NODULES: ICD-10-CM

## 2024-08-12 LAB — GLUCOSE BLDC GLUCOMTR-MCNC: 123 MG/DL (ref 70–130)

## 2024-08-12 PROCEDURE — A9552 F18 FDG: HCPCS | Performed by: INTERNAL MEDICINE

## 2024-08-12 PROCEDURE — 78815 PET IMAGE W/CT SKULL-THIGH: CPT

## 2024-08-12 PROCEDURE — 82948 REAGENT STRIP/BLOOD GLUCOSE: CPT

## 2024-08-12 PROCEDURE — 0 FLUDEOXYGLUCOSE F18 SOLUTION: Performed by: INTERNAL MEDICINE

## 2024-08-12 RX ADMIN — FLUDEOXYGLUCOSE F 18 1 DOSE: 200 INJECTION, SOLUTION INTRAVENOUS at 07:18

## 2024-08-12 NOTE — TELEPHONE ENCOUNTER
Per Dr. Francis. Spoke with Pt with holter monitor results. Pt aware to stop aspirin. And start eliquis 5 mg po bid. Pt states understanding.

## 2024-08-13 ENCOUNTER — TELEPHONE (OUTPATIENT)
Dept: NEUROLOGY | Facility: CLINIC | Age: 69
End: 2024-08-13
Payer: MEDICARE

## 2024-08-13 NOTE — TELEPHONE ENCOUNTER
Caller: Roby Ott    Relationship: Self    Best call back number:   Telephone Information:   Mobile [phone]       Which medication are you concerned about: apixaban (ELIQUIS) 5 MG tablet     What are your concerns: PT STATES AFTER INSURANCE IT WAS STILL GOING TO COST HIM $500, PT STATES HE CAN NOT PAY THAT MUCH FOR MEDICATION FOR ONLY 60 PILLS AND 30 DAYS WORTH OF MEDICATION. . PLEASE REVIEW AND ADVISE.

## 2024-08-14 ENCOUNTER — OFFICE VISIT (OUTPATIENT)
Dept: NEUROLOGY | Facility: CLINIC | Age: 69
End: 2024-08-14
Payer: MEDICARE

## 2024-08-14 ENCOUNTER — TELEPHONE (OUTPATIENT)
Dept: NEUROLOGY | Facility: OTHER | Age: 69
End: 2024-08-14

## 2024-08-14 VITALS
OXYGEN SATURATION: 96 % | HEART RATE: 90 BPM | BODY MASS INDEX: 23.49 KG/M2 | DIASTOLIC BLOOD PRESSURE: 78 MMHG | SYSTOLIC BLOOD PRESSURE: 142 MMHG | WEIGHT: 178 LBS

## 2024-08-14 DIAGNOSIS — R91.8 RIGHT LOWER LOBE LUNG MASS: Primary | ICD-10-CM

## 2024-08-14 DIAGNOSIS — I48.0 PAROXYSMAL ATRIAL FIBRILLATION: ICD-10-CM

## 2024-08-14 DIAGNOSIS — I63.413 CEREBROVASCULAR ACCIDENT (CVA) DUE TO BILATERAL EMBOLISM OF MIDDLE CEREBRAL ARTERIES: ICD-10-CM

## 2024-08-14 NOTE — TELEPHONE ENCOUNTER
UNABLE TO WARM TRANSFER CALL TO [facility]    Pharmacy Name:  [facility]    Reference Number (if applicable):     Pharmacy representative name: MARGAUX    Pharmacy representative phone number: [phone]    What medication are you calling in regards to: VIVEK AND NOEMI    What question does the pharmacy have:     Who is the provider that prescribed the medication:     Additional notes: PT HAS A $540 DEDUCTIBLE THAT WILL HAVE TO BE MET, SO BOTH OF THESE RX WILL BE COSTLY FOR THE PT. PLEASE CALL PHARMACY BACK IF ANY QUESTIONS. MARGAUX WILL BE OFF BUT ANOTHER PHARMACIST WILL BE ABLE TO ASSIST.

## 2024-08-14 NOTE — TELEPHONE ENCOUNTER
Called patient's pharmacy to see if they could check and see if Xarelto would be covered.  Recording stated that they are on a lunch break, left a message to call back.

## 2024-08-14 NOTE — PROGRESS NOTES
CC:    HPI:  Roby Ott is a  69 y.o.  right-handed male with new diagnosis of lung adenocarcinoma, who recently presented to the ER with chief complaints of left-sided weakness and was given TNK and later found to have new embolic strokes and also new lung mass which was biopsied and came back positive for lung adenocarcinoma.    Since discharge patient has been doing well he underwent outpatient physical therapy and regain his strength in the left upper extremity mild residual left upper extremity sensory changes.    Patient's Zio patch showed new onset A-fib, he was prescribed Eliquis however he was not able to afford Eliquis or Xarelto so we are planning to start him on warfarin, he gets warfarin he will be on Eliquis with the free samples are provided in clinic today.        Hospital admission from 7/19 to 7/21/2024  Roby Ott is a 68 y.o. male with past medical history of tobacco abuse, history of lung mass.  Does not visit doctors and does not take any medications at home.  Last night around nighttime 6:50 PM patient started having sudden onset left-sided weakness and numbness so decided to come to the ER.  CT head did not show any acute hypodensity or hypodensity.  The neck did not show any large vessel occlusion so he was given TNK as he was within the window.  Later revealed embolic strokes and also he underwent, workup with pulmonology for new lung mass and late-biopsy results showed lung adenocarcinoma        Past Medical History:   Diagnosis Date    Right lower lobe lung mass 07/20/2024    Stroke 07/17/2024    TIA (transient ischemic attack)          Past Surgical History:   Procedure Laterality Date    SKIN SURGERY  1994    removal of ingrown hairs on face           Current Outpatient Medications:     atorvastatin (LIPITOR) 80 MG tablet, Take 1 tablet by mouth Every Night for 90 days., Disp: 90 tablet, Rfl: 0    apixaban (ELIQUIS) 5 MG tablet tablet, Take 1 tablet by mouth 2 (Two) Times a Day.  (Patient not taking: Reported on 2024), Disp: 60 tablet, Rfl: 0      Family History   Problem Relation Age of Onset    Dementia Mother     Cancer Father         main artery of his heart    Cancer Brother          Social History     Socioeconomic History    Marital status:    Tobacco Use    Smoking status: Former     Current packs/day: 0.00     Average packs/day: 2.0 packs/day for 54.5 years (109.1 ttl pk-yrs)     Types: Cigarettes     Start date:      Quit date: 2024     Years since quittin.0     Passive exposure: Past    Smokeless tobacco: Never   Vaping Use    Vaping status: Never Used   Substance and Sexual Activity    Alcohol use: Not Currently    Drug use: Not Currently     Types: Marijuana    Sexual activity: Defer         No Known Allergies      Pain Scale:        ROS:    Constitutional: [No fevers, chills, sweats or weight loss/gain]   Eye: [No change in vision, double vision, or loss of vision]   HEENT: [No headaches, tenderness, dizziness, or tinnitus. Normal smell and taste. Normal speech and swallowing]   Respiratory: [No shortness of breath, coughing, wheezing]   Cardiovascular: [No Chest pain, palpitations, syncope, DUQUE]   Gastrointestinal: [Normal bowel function. No nausea, vomiting, diarrhea]   Genitourinary: [Normal bladder function]   Musculoskeletal: [No trauma, joint or neck pain, myalgias, cramping or weakness]   Skin: [No itching, burning, pain, rashes, or birthmarks]   Endocrinology: [No heat or cold intolerance]   Psychiatric: [No sleep disturbance. No anxiety or depression]   Neurologic: [See HPI, above]       Physical Exam:  Vitals:    24 1444   BP: 142/78   Pulse: 90   SpO2: 96%   Weight: 80.7 kg (178 lb)     Orthostatic BP:    Body mass index is 23.49 kg/m².    General appearance: Well developed, well nourished, well groomed, alert and cooperative.   HEENT: Normocephalic.     Higher integrative function: Oriented to time, place, person, intact recent and  "remote memory, attention span, concentration and language. Spontaneous speech, fund of vocabulary are normal.   CN II: Normal visual acuity   CN III IV VI: Extraocular movements are full without nystagmus. Pupils are equal, round, and reactive to light.   CN V: Sensation same on both sides of the face  CN VII: Facial movements are symmetric, no weakness.   CN XII: Motor left upper extremity 4+/5 rest of the extremities 5+/5  Sensation: Decreased sensation along the left upper extremity  Station and gait: Normal gait   Coordination: Finger to nose test showed no dysmetria      Lab Results   Component Value Date    GLUCOSE 100 (H) 07/23/2024    BUN 13 07/23/2024    CREATININE 0.86 07/23/2024    BCR 15.1 07/23/2024    CO2 23.0 07/23/2024    CALCIUM 8.7 07/23/2024    ALBUMIN 3.6 07/23/2024    AST 34 07/23/2024    ALT 9 07/23/2024       Lab Results   Component Value Date    WBC 12.51 (H) 07/23/2024    HGB 14.8 07/23/2024    HCT 44.7 07/23/2024    MCV 94.1 07/23/2024     07/23/2024         .No results found for: \"RPR\"      No results found for: \"TSH\", \"O3FSUGB\", \"P9SHZKI\", \"THYROIDAB\"      No results found for: \"CHKDCDBZ96\"      No results found for: \"FOLATE\"      Lab Results   Component Value Date    HGBA1C 6.20 (H) 07/19/2024         Lab Results   Component Value Date    GLUCOSE 100 (H) 07/23/2024    BUN 13 07/23/2024    CREATININE 0.86 07/23/2024    BCR 15.1 07/23/2024    K 4.4 07/23/2024    CO2 23.0 07/23/2024    CALCIUM 8.7 07/23/2024    ALBUMIN 3.6 07/23/2024    AST 34 07/23/2024    ALT 9 07/23/2024         Lab Results   Component Value Date    WBC 12.51 (H) 07/23/2024    HGB 14.8 07/23/2024    HCT 44.7 07/23/2024    MCV 94.1 07/23/2024     07/23/2024       Results for orders placed during the hospital encounter of 07/18/24    CT Angiogram Neck    Narrative  CT ANGIOGRAM NECK AND HEAD WITH CONTRAST AND CT PERFUSION WITH CONTRAST    HISTORY: Stroke, right arm arm weakness.    COMPARISON: " None.    Initially, a noncontrasted CT examination of the brain was performed.  There is no evidence of acute infarction or of intracranial hemorrhage.  There is a calcified lesion involving the right parietal bone  superolaterally, well demarcated with central calcification measuring  approximately 3 cm in the AP dimension, 4 cm in the transverse dimension  and approximately 1 cm in the craniocaudal dimension.    CT PERFUSION: There was no evidence of abnormal perfusion.    CT angiogram of the neck and head with contrast: The great vessels are  arranged in a classic configuration. There is eccentric noncalcified  plaque appreciated involving in the left common carotid artery  proximally. This results in mild stenosis. Atherosclerotic disease is  appreciated involving the carotid bifurcations bilaterally but with 0%  stenosis on the right. On the left there is mild irregularity and mild  stenosis (estimated to be approximately 10 to 15% maximally using NASCET  criteria. Vascular calcification involving the carotid siphons are  appreciated bilaterally without high-grade stenosis. The left A1 segment  is mildly hypoplastic. The proximal aspects of the anterior and middle  cerebral arteries appear otherwise unremarkable.    The right vertebral artery is dominant. The left vertebral artery arises  directly from the aortic arch. The cervical segments are of relatively  uniform caliber. The basilar artery and left posterior cerebral artery  appear unremarkable. There is a fetal origin right posterior cerebral  artery.    Sagittal reconstructions demonstrate reversal of the normal cervical  lordosis. There is moderate to severe loss of disc height at C5-6 and  C6-7. Moderate to severe foraminal stenosis is present on the right at  C5-6 and C6-7 secondary to loss of disc height and uncovertebral  degenerative disease.    Impression  There is no evidence of acute infarction, intracranial  hemorrhage or of hydrocephalus.  Further evaluation could be performed  with a MRI examination of the brain with and without contrast. A  benign-appearing calcified lesion noted involving the right parietal  bone superolaterally measuring approximately 3 x 4 x 1 cm in size with  benign characteristics. Although nonspecific, this likely represents a  calcified hematoma. Clinical correlation suggested. There is  atherosclerotic disease appreciated involving the carotid bifurcations  bilaterally with 0% stenosis on the right and 10 to 15% stenosis on the  left. There is mild stenosis involving the origin of the left common  carotid artery. There is no evidence of proximal intracranial arterial  high-grade stenosis or occlusion.      AI analysis of LVO was utilized.    Radiation dose reduction techniques were utilized, including automated  exposure control and exposure modulation based on body size.      This report was finalized on 7/19/2024 5:01 PM by Dr. Raymond Rea M.D  on Workstation: [id number]         Imaging Reviewed     A1c 6.2       WBC 11.5  Hemoglobin 13 and platelets 214     Imaging review:   CT head no acute hypodensity or hypodensity, benign lesion on the right parietal bone     CTA head and neck no large vessel occlusion or stenosis mild athero of the left ICA     MRI Brain MRI showed acute diffusion restriction along the right MCA left frontal and also right PCA, just infarct along the right MCA no SWI changes     TTE 80% EF normal LAE no clot no thrombus no PFO    Zio patch -Evidence of atrial fibrillation was noted. There was a 3% burden of atrial fibrillation, with rates ranging from  bpm (average 128 bpm). The longest episode lasted 4 hours      Diagnoses:  Embolic strokes s/p TNK  New Onset A-fib on Zio patch  Lung adenocarcinoma  Tobacco abuse     Pre-stroke MRS: NIHSS:       Plan   -Stop aspirin  -Start Eliquis 5 mg twice daily until he gets the prescription of warfarin then stop Eliquis and continue warfarin  with regular INR checks,  INR goal of 2-3  -Last  continue Lipitor 80 mg daily, stop Lipitor if he develops muscle cramps  -Follow-up with cardiology for new onset A-fib  -Return to clinic in a year, earlier if new neurological symptoms     Diagnoses and all orders for this visit:    1. Right lower lobe lung mass (Primary)    2. Cerebrovascular accident (CVA) due to bilateral embolism of middle cerebral arteries    3. Paroxysmal atrial fibrillation  -     Ambulatory Referral to Cardiology          For history of TIA/stroke  Continue antiplatelet/AC as prescribed.  HLD: Goal LDL <70, should continue surveillance labs and management with PCP  HTN: goal of asymptomatic normotension. If elevated pt should reach out to PCP office, and if remains elevated at home go to urgent care and/or emergency room  DM: Continue monitoring of and control of blood sugars per PCP and/or endocrinology  Secondary stroke prevention: Ideal targets for stroke prevention would be Blood pressure < 130/80; LDL < 70; HbA1c < 6.5 and smoking cessation if applicable.  Call 911 for stroke symptoms      MDM   Reviewed: Previous charts, nursing notes and vitals   Reviewed: Previous labs and CT CTA/MRI scan    Interpretation: Labs and CT/CTA/MRI scan   Total time providing care is :30-74 minutes. This excluded time spent performing separately reportable procedures and services  Consults :Neurology/Stroke    Please note that portions of this note were completed with a voice recognition program.     Jatin Francis MD  Neuro Hospitalist /Vascular Neurology.                       Dictated utilizing Dragon dictation.

## 2024-08-19 LAB
LAB AP CASE REPORT: NORMAL
LAB AP CLINICAL INFORMATION: NORMAL
LAB AP DIAGNOSIS COMMENT: NORMAL
LAB AP INTRADEPARTMENTAL CONSULT: NORMAL
LAB AP SPECIAL STAINS: NORMAL
PATH REPORT.ADDENDUM SPEC: NORMAL
PATH REPORT.FINAL DX SPEC: NORMAL
PATH REPORT.GROSS SPEC: NORMAL

## 2024-08-20 ENCOUNTER — TELEPHONE (OUTPATIENT)
Dept: NEUROLOGY | Facility: CLINIC | Age: 69
End: 2024-08-20
Payer: MEDICARE

## 2024-08-20 ENCOUNTER — PATIENT OUTREACH (OUTPATIENT)
Dept: OTHER | Facility: HOSPITAL | Age: 69
End: 2024-08-20
Payer: MEDICARE

## 2024-08-20 DIAGNOSIS — C34.91 MALIGNANT NEOPLASM OF RIGHT LUNG, UNSPECIFIED PART OF LUNG: Primary | ICD-10-CM

## 2024-08-20 NOTE — TELEPHONE ENCOUNTER
Called patient to discuss anticoagulation.  Patient has changed his insurance to a better plan and they told him that Eliquis will cost about $47 per month.  New plan starts 9/1/24.  Placed samples up front for patient to  this week when he is in the area to get him through until his insurance will cover Eliquis.

## 2024-08-21 ENCOUNTER — PATIENT OUTREACH (OUTPATIENT)
Dept: OTHER | Facility: HOSPITAL | Age: 69
End: 2024-08-21
Payer: MEDICARE

## 2024-08-21 DIAGNOSIS — C34.31 MALIGNANT NEOPLASM OF LOWER LOBE OF RIGHT LUNG: Primary | ICD-10-CM

## 2024-08-21 NOTE — PROGRESS NOTES
Referral received from Dr. Mock.  I called patient, introduced myself and explained navigational services.      The patient was hospitalized from 7/18-7/23/24 with left sided weakness.  Diagnostics revealed an embolic stroke, status post TNKase with good neurological recovery. The patient was also found to have a right lower lobe lung mass. He underwent lung biopsy on 7/22/24, which revealed minimally represented carcinoma with extensive necrosis consistent with lung primary adenocarcinoma. PDL-1 35%. The patient was followed by neurology and pulmonology. He discharged home on 7/23/24 with OP follow up.  The patient met with neurology most recently on 8/14. Since discharge patient has been doing well he underwent outpatient physical therapy and regain his strength in the left upper extremity mild residual left upper extremity sensory changes. Per the neurology note, the patient's Zio patch showed new onset A-fib and Eliquis was started. He was given free samples due to the cost. Neurology notes they will transition to Warfarin and referred the patient to cardiology for new onset A fib.     The patient met with Dr. Dupree with [facility] regarding his biopsy results. He underwent a PET scan on 8/12/24 which revealed a hypermetabolic 3.6 x 3.6 cm lobular right lower lobe lung mass (SUV 11.5) with no hypermetabolic right hilar or mediastinal lymphadenopathy. Additionally, hypermetabolic activity along a long segment of thickened distal sigmoid colon was noted (SUV 21.7).  No other suspicious hypermetabolic activity was noted.  The patient states he had PFTs in Dr. Dupree's office recently which revealed 60% lung function.  We will request these results as well as Dr. Dupree's notes from [facility].   Dr. Dupree referred the patient to [facility]; he is scheduled to meet Dr. Mock tomorrow.  We discussed the date/time/location of this appt as well as what to expect at the appt.  The patient verbalized understanding.      The patient is alert and  oriented. He ambulates without assistive devices, denies falls and is independent with ADLs.  The patient lives in a single level ranch style home with his wife in [place], [place].     The patient is a current cigarette smoker, approximately 2ppd since he was 14 years old.  Provided smoking cessation and the [place] quit line information.    The patient has [employer] Medicare Replacement, although states that he is moving to [employer] as of 9/1/24.  He reports he owes 2K for his admission, which is is uncertain how he will pay.  We discussed financial navigation, cost estimates and possible financial assistance. Will refer to Timothy financial navigator.      The patient drives; he denies transportation issues.  The patient reports that he is on a fixed income receiving near $1900/mth.  He reports difficulty affording household bills. Referral placed to [facility].     The patient has been eating well and denies weight loss.    The patient is processing through his new diagnosis.  Provided active listening and emotional support, validating and normalizing patient's feelings. He reports an adequate support system and does not take any medications for anxiety or depression.  We discussed behavioral oncology services.  Patient expressed gratitude for my support and denied any additional needs at this time. We also discussed [employer] and [facility] [employer]. The patient will let me know if he would like referrals sent to either organization in the future.    We discussed integrative therapies and other services [facility].  He will receive a navigation folder with the following information at his appointment with Dr. Mock tomorrow: [facility] [employer] [facility] Support [facility], Cancer and Restor[facility] - CARE, [facility] exercise program, Living with a Diagnosis of Lung Cancer, Lung Cancer [place] Support Services, [facility] [place], [facility], Reiki Therapy, [facility], Cancer  Nutrition, Smoking Cessation and Survivorship Clinic.      I will meet him in lung clinic tomorrow; provided my name and number and encouraged patient to call if any needs arise.

## 2024-08-22 ENCOUNTER — PATIENT OUTREACH (OUTPATIENT)
Dept: OTHER | Facility: HOSPITAL | Age: 69
End: 2024-08-22
Payer: MEDICARE

## 2024-08-22 ENCOUNTER — OFFICE VISIT (OUTPATIENT)
Dept: OTHER | Facility: HOSPITAL | Age: 69
End: 2024-08-22
Payer: MEDICARE

## 2024-08-22 VITALS
BODY MASS INDEX: 23.83 KG/M2 | OXYGEN SATURATION: 96 % | WEIGHT: 179.8 LBS | SYSTOLIC BLOOD PRESSURE: 114 MMHG | HEIGHT: 73 IN | DIASTOLIC BLOOD PRESSURE: 69 MMHG | HEART RATE: 78 BPM

## 2024-08-22 DIAGNOSIS — C34.31 MALIGNANT NEOPLASM OF LOWER LOBE OF RIGHT LUNG: Primary | ICD-10-CM

## 2024-08-22 PROCEDURE — G0463 HOSPITAL OUTPT CLINIC VISIT: HCPCS | Performed by: SURGERY

## 2024-08-22 NOTE — H&P (VIEW-ONLY)
THORACIC SURGERY CLINIC CONSULT NOTE    REASON FOR CONSULT: 3.8 cm adenocarcinoma in the right lower lobe    REFERRING PROVIDER: Maryan Dupree MD    Subjective   HISTORY OF PRESENTING ILLNESS:   Roby Ott is a 69 y.o. male who has significant medical problems as mentioned in the medical chart.     History of Present Illness  In July 2024, he was admitted to hospital for stroke.  As a part of workup, CT scan of the chest was performed which revealed multi lobulated 3.8 x 3.2 cm mass in the right lower lobe concerning for primary lung malignancy.  He underwent CT-guided biopsy on 7/22/2024 which confirmed adenocarcinoma of pulmonary origin.  PET/CT on 8/12/2024 reported 3.6 x 3.6 cm lobular right lower lobe mass with SUV of 11.5.  There were no hypermetabolic right hilar mediastinal adenopathy.  He also had hypermetabolic activity along the long segment of thickened distal sigmoid colon with SUV of 21.7.  He was referred to thoracic surgery for further evaluation.    He recalls having several minor strokes characterized by transient numbness in his shoulder and arm. However, the numbness from the recent stroke has persisted. His arm function has improved, but he still experiences some discomfort. He notes that his condition improves overnight and he feels better upon waking, but as the day progresses, the numbness intensifies. He has limited mobility and does not walk much.    He has no history of cancer, liver or kidney issues, or heart attacks. A previous examination of his heart showed no signs of a heart attack. He has never undergone a colonoscopy.    He reports a bad taste in his throat in the mornings but does not experience acid reflux. He makes an effort to keep his head elevated while sleeping.    Past Medical History:   Diagnosis Date    Right lower lobe lung mass 07/20/2024    Stroke 07/17/2024    TIA (transient ischemic attack)        Past Surgical History:   Procedure Laterality Date    SKIN  "SURGERY  1994    removal of ingrown hairs on face       Family History   Problem Relation Age of Onset    Dementia Mother     Cancer Father         main artery of his heart    Cancer Brother        Social History     Socioeconomic History    Marital status:    Tobacco Use    Smoking status: Former     Current packs/day: 0.00     Average packs/day: 2.0 packs/day for 54.5 years (109.1 ttl pk-yrs)     Types: Cigarettes     Start date:      Quit date: 2024     Years since quittin.1     Passive exposure: Past    Smokeless tobacco: Never   Vaping Use    Vaping status: Never Used   Substance and Sexual Activity    Alcohol use: Not Currently    Drug use: Not Currently     Types: Marijuana    Sexual activity: Defer         Current Outpatient Medications:     apixaban (ELIQUIS) 5 MG tablet tablet, Take 1 tablet by mouth 2 (Two) Times a Day., Disp: 60 tablet, Rfl: 0    atorvastatin (LIPITOR) 80 MG tablet, Take 1 tablet by mouth Every Night for 90 days., Disp: 90 tablet, Rfl: 0     No Known Allergies          Objective    OBJECTIVE:     VITAL SIGNS:  /69   Pulse 78   Ht 185.4 cm (72.99\")   Wt 81.6 kg (179 lb 12.8 oz)   SpO2 96%   BMI 23.73 kg/m²     PHYSICAL EXAM:  Normal appearance.   Head is normocephalic.   Nose appears normal.   No obvious deformity of the mouth and throat.  Conjunctivae normal.   Heart rate and rhythm is normal.  Pulmonary effort is normal.   Moving all 4 extremities.  Extremities warm.  No focal deficit present.   He is alert and oriented to person, place, and time.     LAB RESULTS:  I have reviewed all the available laboratory results in the chart.    RESULTS REVIEW:  I have reviewed the patient's all relevant laboratory and imaging findings.     Results      ASSESSMENT & PLAN:  Roby Ott is a 69 y.o. male with significant medical conditions as mentioned above presented to my clinic.    Diagnosis: 3.8 cm adenocarcinoma in the right lower lobe  Staging: " Undetermined    Assessment & Plan  1. Suspected lung cancer.  He has 3.8 cm right lower lobe mass that is biopsy-proven adenocarcinoma an MRI of the brain will be conducted to check for metastasis.  He will also require endobronchial ultrasound for mediastinal staging. If the lymph nodes are negative for malignancy and the brain MRI shows no signs of metastasis, we could potentially cure his cancer.  I discussed in detail with him surgical resection versus radiation treatment.  Given the recent stroke, the risk of surgery needs to be carefully evaluated. If surgery is deemed too risky, radiation therapy may be an alternative.     2. Recent stroke.  The patient had a stroke a month ago and has a history of multiple strokes. He reports residual numbness in the arm that worsens throughout the day. The stroke could be related to cancer or an abnormal heart rhythm. He is currently on anticoagulation therapy, possibly Eliquis, with a recent discussion about switching to Coumadin or warfarin due to cost. The risk of another stroke needs to be balanced against the benefits of lung surgery. Consultation with a neurologist is recommended to manage the stroke risk and anticoagulation therapy.    3. Gastroesophageal reflux disease (GERD).  He reports a bad taste in the throat in the mornings, which could be due to acid reflux. He is advised to keep his head elevated while sleeping and avoid eating 2-3 hours before bedtime to prevent reflux symptoms.    4. Health Maintenance.  He has never had a colonoscopy. Given his age and the abnormal spot on the colon seen in the PET scan, a colonoscopy is recommended to rule out colon cancer or other abnormalities.      I discussed the patients findings and my recommendations with the patient. The patient was given adequate time to ask questions and all questions were answered to patient satisfaction. Thank you for this consult and allowing us to participate in the care of your patient.       Terrence Mock MD  Thoracic Surgeon  [facility]        Dictated utilizing Dragon dictation    I spent 60 minutes caring for Roby on this date of service. This time includes time spent by me in the following activities:preparing for the visit, reviewing tests, obtaining and/or reviewing a separately obtained history, performing a medically appropriate examination and/or evaluation , counseling and educating the patient/family/caregiver, ordering medications, tests, or procedures, referring and communicating with other health care professionals , documenting information in the medical record, independently interpreting results and communicating that information with the patient/family/caregiver, and care coordination and more than half the time was spent in direct face to face evaluation and decision making.     Patient or patient representative verbalized consent for the use of Ambient Listening during the visit with  Terrence Mock MD for chart documentation. 8/28/2024  16:15 EDT

## 2024-08-22 NOTE — PROGRESS NOTES
THORACIC SURGERY CLINIC CONSULT NOTE    REASON FOR CONSULT: 3.8 cm adenocarcinoma in the right lower lobe    REFERRING PROVIDER: Maryan Dupree MD    Subjective   HISTORY OF PRESENTING ILLNESS:   Roby Ott is a 69 y.o. male who has significant medical problems as mentioned in the medical chart.     History of Present Illness  In July 2024, he was admitted to hospital for stroke.  As a part of workup, CT scan of the chest was performed which revealed multi lobulated 3.8 x 3.2 cm mass in the right lower lobe concerning for primary lung malignancy.  He underwent CT-guided biopsy on 7/22/2024 which confirmed adenocarcinoma of pulmonary origin.  PET/CT on 8/12/2024 reported 3.6 x 3.6 cm lobular right lower lobe mass with SUV of 11.5.  There were no hypermetabolic right hilar mediastinal adenopathy.  He also had hypermetabolic activity along the long segment of thickened distal sigmoid colon with SUV of 21.7.  He was referred to thoracic surgery for further evaluation.    He recalls having several minor strokes characterized by transient numbness in his shoulder and arm. However, the numbness from the recent stroke has persisted. His arm function has improved, but he still experiences some discomfort. He notes that his condition improves overnight and he feels better upon waking, but as the day progresses, the numbness intensifies. He has limited mobility and does not walk much.    He has no history of cancer, liver or kidney issues, or heart attacks. A previous examination of his heart showed no signs of a heart attack. He has never undergone a colonoscopy.    He reports a bad taste in his throat in the mornings but does not experience acid reflux. He makes an effort to keep his head elevated while sleeping.    Past Medical History:   Diagnosis Date    Right lower lobe lung mass 07/20/2024    Stroke 07/17/2024    TIA (transient ischemic attack)        Past Surgical History:   Procedure Laterality Date    SKIN  "SURGERY  1994    removal of ingrown hairs on face       Family History   Problem Relation Age of Onset    Dementia Mother     Cancer Father         main artery of his heart    Cancer Brother        Social History     Socioeconomic History    Marital status:    Tobacco Use    Smoking status: Former     Current packs/day: 0.00     Average packs/day: 2.0 packs/day for 54.5 years (109.1 ttl pk-yrs)     Types: Cigarettes     Start date:      Quit date: 2024     Years since quittin.1     Passive exposure: Past    Smokeless tobacco: Never   Vaping Use    Vaping status: Never Used   Substance and Sexual Activity    Alcohol use: Not Currently    Drug use: Not Currently     Types: Marijuana    Sexual activity: Defer         Current Outpatient Medications:     apixaban (ELIQUIS) 5 MG tablet tablet, Take 1 tablet by mouth 2 (Two) Times a Day., Disp: 60 tablet, Rfl: 0    atorvastatin (LIPITOR) 80 MG tablet, Take 1 tablet by mouth Every Night for 90 days., Disp: 90 tablet, Rfl: 0     No Known Allergies          Objective    OBJECTIVE:     VITAL SIGNS:  /69   Pulse 78   Ht 185.4 cm (72.99\")   Wt 81.6 kg (179 lb 12.8 oz)   SpO2 96%   BMI 23.73 kg/m²     PHYSICAL EXAM:  Normal appearance.   Head is normocephalic.   Nose appears normal.   No obvious deformity of the mouth and throat.  Conjunctivae normal.   Heart rate and rhythm is normal.  Pulmonary effort is normal.   Moving all 4 extremities.  Extremities warm.  No focal deficit present.   He is alert and oriented to person, place, and time.     LAB RESULTS:  I have reviewed all the available laboratory results in the chart.    RESULTS REVIEW:  I have reviewed the patient's all relevant laboratory and imaging findings.     Results      ASSESSMENT & PLAN:  Roby Ott is a 69 y.o. male with significant medical conditions as mentioned above presented to my clinic.    Diagnosis: 3.8 cm adenocarcinoma in the right lower lobe  Staging: " Undetermined    Assessment & Plan  1. Suspected lung cancer.  He has 3.8 cm right lower lobe mass that is biopsy-proven adenocarcinoma an MRI of the brain will be conducted to check for metastasis.  He will also require endobronchial ultrasound for mediastinal staging. If the lymph nodes are negative for malignancy and the brain MRI shows no signs of metastasis, we could potentially cure his cancer.  I discussed in detail with him surgical resection versus radiation treatment.  Given the recent stroke, the risk of surgery needs to be carefully evaluated. If surgery is deemed too risky, radiation therapy may be an alternative.     2. Recent stroke.  The patient had a stroke a month ago and has a history of multiple strokes. He reports residual numbness in the arm that worsens throughout the day. The stroke could be related to cancer or an abnormal heart rhythm. He is currently on anticoagulation therapy, possibly Eliquis, with a recent discussion about switching to Coumadin or warfarin due to cost. The risk of another stroke needs to be balanced against the benefits of lung surgery. Consultation with a neurologist is recommended to manage the stroke risk and anticoagulation therapy.    3. Gastroesophageal reflux disease (GERD).  He reports a bad taste in the throat in the mornings, which could be due to acid reflux. He is advised to keep his head elevated while sleeping and avoid eating 2-3 hours before bedtime to prevent reflux symptoms.    4. Health Maintenance.  He has never had a colonoscopy. Given his age and the abnormal spot on the colon seen in the PET scan, a colonoscopy is recommended to rule out colon cancer or other abnormalities.      I discussed the patients findings and my recommendations with the patient. The patient was given adequate time to ask questions and all questions were answered to patient satisfaction. Thank you for this consult and allowing us to participate in the care of your patient.       Terrence Mock MD  Thoracic Surgeon  [facility]        Dictated utilizing Dragon dictation    I spent 60 minutes caring for Roby on this date of service. This time includes time spent by me in the following activities:preparing for the visit, reviewing tests, obtaining and/or reviewing a separately obtained history, performing a medically appropriate examination and/or evaluation , counseling and educating the patient/family/caregiver, ordering medications, tests, or procedures, referring and communicating with other health care professionals , documenting information in the medical record, independently interpreting results and communicating that information with the patient/family/caregiver, and care coordination and more than half the time was spent in direct face to face evaluation and decision making.     Patient or patient representative verbalized consent for the use of Ambient Listening during the visit with  Terrence Mock MD for chart documentation. 8/28/2024  16:15 EDT

## 2024-08-22 NOTE — PROGRESS NOTES
I accompanied patient and wife to Dr. Mock's lung clinic visit today. Dr. Mock discussed the patient's 7/22/24 RLL lung biopsy and 8/12/24 PET scan results.  Dr. Mock discussed an EUS to confirm there is no lymph node metastasis as well as a MRI of the brain. Dr. Mock explained the patient will need a colonoscopy in the future to further investigate the hypermetabolic area noted int he PET scan. Patient verbalized understanding. The patient will meet with Dr. Mock in the lung clinic on 9/5/24 to discuss the results and next steps.    The patient is uncertain if he would like to proceed with treatment. We discussed the diagnostic testing that was ordered would provide information to complete his lung cancer staging. The patient elected to proceed with scheduling these procedures.  His MRI is scheduled for 8/28. Dr. Mock's office will contact him regarding the EUS.     The patient states he has not smoked since his July admission.      I will continue to follow.  Encouraged patient/wife to call as needed.

## 2024-08-27 ENCOUNTER — DOCUMENTATION (OUTPATIENT)
Dept: OTHER | Facility: HOSPITAL | Age: 69
End: 2024-08-27
Payer: MEDICARE

## 2024-08-27 NOTE — PROGRESS NOTES
Oncology Social Work     OSW received a referral from Dr. Mock for financial distress. OSW will meet with patient during lung clinic on 9/4.    Shefali PETERSON

## 2024-08-28 ENCOUNTER — HOSPITAL ENCOUNTER (OUTPATIENT)
Dept: MRI IMAGING | Facility: HOSPITAL | Age: 69
Discharge: HOME | End: 2024-08-28
Admitting: SURGERY
Payer: MEDICARE

## 2024-08-28 ENCOUNTER — PREP FOR SURGERY (OUTPATIENT)
Dept: OTHER | Facility: HOSPITAL | Age: 69
End: 2024-08-28
Payer: MEDICARE

## 2024-08-28 DIAGNOSIS — C34.31 MALIGNANT NEOPLASM OF LOWER LOBE OF RIGHT LUNG: ICD-10-CM

## 2024-08-28 DIAGNOSIS — C34.31 MALIGNANT NEOPLASM OF LOWER LOBE OF RIGHT LUNG: Primary | ICD-10-CM

## 2024-08-28 PROCEDURE — 70553 MRI BRAIN STEM W/O & W/DYE: CPT

## 2024-08-28 PROCEDURE — 0 GADOBENATE DIMEGLUMINE 529 MG/ML SOLUTION: Performed by: SURGERY

## 2024-08-28 PROCEDURE — A9577 INJ MULTIHANCE: HCPCS | Performed by: SURGERY

## 2024-08-28 RX ADMIN — GADOBENATE DIMEGLUMINE 16 ML: 529 INJECTION, SOLUTION INTRAVENOUS at 17:06

## 2024-08-29 ENCOUNTER — TELEPHONE (OUTPATIENT)
Dept: SURGERY | Facility: CLINIC | Age: 69
End: 2024-08-29
Payer: MEDICARE

## 2024-08-29 PROBLEM — C34.31 MALIGNANT NEOPLASM OF LOWER LOBE OF RIGHT LUNG: Status: ACTIVE | Noted: 2024-08-28

## 2024-08-29 PROBLEM — Z86.73 HISTORY OF STROKE: Status: ACTIVE | Noted: 2024-08-29

## 2024-08-29 PROBLEM — I63.413 CEREBROVASCULAR ACCIDENT (CVA) DUE TO BILATERAL EMBOLISM OF MIDDLE CEREBRAL ARTERIES: Status: ACTIVE | Noted: 2024-08-29

## 2024-08-29 NOTE — TELEPHONE ENCOUNTER
I notified pt of PFT that is scheduled for 9/3/24 and gave pt location and instructions.     DB 10:01  8/29/24

## 2024-09-02 DIAGNOSIS — G93.9 BRAIN LESION: Primary | ICD-10-CM

## 2024-09-03 ENCOUNTER — PATIENT OUTREACH (OUTPATIENT)
Dept: OTHER | Facility: HOSPITAL | Age: 69
End: 2024-09-03
Payer: MEDICARE

## 2024-09-03 ENCOUNTER — HOSPITAL ENCOUNTER (OUTPATIENT)
Dept: RESPIRATORY THERAPY | Facility: HOSPITAL | Age: 69
Discharge: HOME | End: 2024-09-03
Payer: MEDICARE

## 2024-09-03 ENCOUNTER — TELEPHONE (OUTPATIENT)
Dept: SURGERY | Facility: CLINIC | Age: 69
End: 2024-09-03
Payer: MEDICARE

## 2024-09-03 DIAGNOSIS — C34.31 MALIGNANT NEOPLASM OF LOWER LOBE OF RIGHT LUNG: ICD-10-CM

## 2024-09-03 RX ORDER — ALBUTEROL SULFATE 0.83 MG/ML
2.5 SOLUTION RESPIRATORY (INHALATION) ONCE AS NEEDED
Status: DISCONTINUED | OUTPATIENT
Start: 2024-09-03 | End: 2024-09-04 | Stop reason: HOSPADM

## 2024-09-03 NOTE — PROGRESS NOTES
Message from Dr. Mock. Contacted Dr. Davis's office, confirmed they rec'd referral. Patient as EUS scheduled 9/6, moving [facility] follow up appt to 9/12 per Dr. Mock's request    Called patient, left vm that we moved his 9/5 appt to 9/12 at 2PM. Call back if this appt date/time doesn't work.

## 2024-09-03 NOTE — TELEPHONE ENCOUNTER
Jah from PFT just called stating this pt already had a test done with [facility]??? I dont see the result or where its in his chart? And she could either, but she said he could describe everything they did so she didn't want to make him go threw this again and insurance not cover it. Miss Tyson can be reached at [phone] . I told her id let you know to see where to go from here or if we can even get the results?

## 2024-09-05 ENCOUNTER — TELEPHONE (OUTPATIENT)
Dept: OTHER | Facility: HOSPITAL | Age: 69
End: 2024-09-05
Payer: MEDICARE

## 2024-09-05 NOTE — TELEPHONE ENCOUNTER
Oncology Social Work     OSW reached out to patient related to OSW referral. OSW spoke to patient on the phone. He reported his biggest concern is his medical bills. He is on a very fixed income of only social security. His wife does not have any income. OSW will meet patient after lung clinic 9/12 with Janis Bosch CECY

## 2024-09-06 ENCOUNTER — ANESTHESIA (OUTPATIENT)
Dept: GASTROENTEROLOGY | Facility: HOSPITAL | Age: 69
End: 2024-09-06
Payer: MEDICARE

## 2024-09-06 ENCOUNTER — TELEPHONE (OUTPATIENT)
Dept: GASTROENTEROLOGY | Facility: CLINIC | Age: 69
End: 2024-09-06
Payer: MEDICARE

## 2024-09-06 ENCOUNTER — ANESTHESIA EVENT (OUTPATIENT)
Dept: GASTROENTEROLOGY | Facility: HOSPITAL | Age: 69
End: 2024-09-06
Payer: MEDICARE

## 2024-09-06 ENCOUNTER — PATIENT OUTREACH (OUTPATIENT)
Dept: OTHER | Facility: HOSPITAL | Age: 69
End: 2024-09-06
Payer: MEDICARE

## 2024-09-06 ENCOUNTER — HOSPITAL ENCOUNTER (OUTPATIENT)
Facility: HOSPITAL | Age: 69
Setting detail: HOSPITAL OUTPATIENT SURGERY
Discharge: HOME | End: 2024-09-06
Attending: SURGERY | Admitting: SURGERY
Payer: MEDICARE

## 2024-09-06 ENCOUNTER — TELEPHONE (OUTPATIENT)
Dept: NEUROSURGERY | Facility: CLINIC | Age: 69
End: 2024-09-06
Payer: MEDICARE

## 2024-09-06 VITALS
DIASTOLIC BLOOD PRESSURE: 74 MMHG | OXYGEN SATURATION: 98 % | SYSTOLIC BLOOD PRESSURE: 120 MMHG | HEART RATE: 75 BPM | BODY MASS INDEX: 23.23 KG/M2 | WEIGHT: 176 LBS | RESPIRATION RATE: 18 BRPM

## 2024-09-06 PROCEDURE — 25810000003 LACTATED RINGERS PER 1000 ML: Performed by: SURGERY

## 2024-09-06 PROCEDURE — 31622 DX BRONCHOSCOPE/WASH: CPT | Performed by: SURGERY

## 2024-09-06 PROCEDURE — 25010000002 PROPOFOL 10 MG/ML EMULSION: Performed by: NURSE ANESTHETIST, CERTIFIED REGISTERED

## 2024-09-06 RX ORDER — LIDOCAINE HYDROCHLORIDE 20 MG/ML
INJECTION, SOLUTION INFILTRATION; PERINEURAL AS NEEDED
Status: DISCONTINUED | OUTPATIENT
Start: 2024-09-06 | End: 2024-09-06 | Stop reason: SURG

## 2024-09-06 RX ORDER — SODIUM CHLORIDE, SODIUM LACTATE, POTASSIUM CHLORIDE, CALCIUM CHLORIDE 600; 310; 30; 20 MG/100ML; MG/100ML; MG/100ML; MG/100ML
1000 INJECTION, SOLUTION INTRAVENOUS CONTINUOUS
Status: DISCONTINUED | OUTPATIENT
Start: 2024-09-06 | End: 2024-09-06 | Stop reason: HOSPADM

## 2024-09-06 RX ORDER — LIDOCAINE HYDROCHLORIDE 20 MG/ML
INJECTION, SOLUTION EPIDURAL; INFILTRATION; INTRACAUDAL; PERINEURAL AS NEEDED
Status: DISCONTINUED | OUTPATIENT
Start: 2024-09-06 | End: 2024-09-06 | Stop reason: HOSPADM

## 2024-09-06 RX ORDER — PROPOFOL 10 MG/ML
VIAL (ML) INTRAVENOUS AS NEEDED
Status: DISCONTINUED | OUTPATIENT
Start: 2024-09-06 | End: 2024-09-06 | Stop reason: SURG

## 2024-09-06 RX ADMIN — PROPOFOL 160 MCG/KG/MIN: 10 INJECTION, EMULSION INTRAVENOUS at 09:14

## 2024-09-06 RX ADMIN — SODIUM CHLORIDE, POTASSIUM CHLORIDE, SODIUM LACTATE AND CALCIUM CHLORIDE 1000 ML: 600; 310; 30; 20 INJECTION, SOLUTION INTRAVENOUS at 08:31

## 2024-09-06 RX ADMIN — LIDOCAINE HYDROCHLORIDE 3 ML: 20 INJECTION, SOLUTION INFILTRATION; PERINEURAL at 09:11

## 2024-09-06 RX ADMIN — PROPOFOL 160 MG: 10 INJECTION, EMULSION INTRAVENOUS at 09:11

## 2024-09-06 NOTE — TELEPHONE ENCOUNTER
Screening colonoscopy    No personal hx of polyps   No family hx of polyps or cx      ASA or blood thinners:  Eliquis    List medications  Eliquis  Atorvastatin    OA form scanned into media

## 2024-09-06 NOTE — PROGRESS NOTES
Message from Dr. Mock regarding appt with Dr. Davis, need to move sooner if possible.  Contacted Dr. Davis's office, got appt moved to 9/10 at 945.    Also contacted Gastroenterology; they will try to reach patient to schedule colonoscopy.      Called patient. Gave date/time/location of Dr. Davis appt.  Explained GI will be calling him to schedule colonoscopy. I will [place] [facility] [place]12

## 2024-09-06 NOTE — DISCHARGE INSTRUCTIONS
Endoscopic ultrasound and bronchoscopy with fine needle aspiration    Samples (biopsies) of the lymph nodes are taken from inside the lungs and sent for diagnostic testing.    Final results of your biopsy take up to 5 business days to process; your endoscopic physician will contact you with your results.    EBUS and robotic bronchoscopy is recommended in the following instances:  To diagnose different types of lung disorders (such as sarcoidosis or tuberculosis)  To diagnose or 'stage' cancer   To investigate enlarged lymph  nodes in the chest    Due to effects of sedation, do not drive or operate heavy machinery for 24 hours.    Avoid heavy lifting (>10 lbs.) or strenuous activity for 48 hours.    Continue to avoid non-steroidal anti-inflammatory medications (NSAIDS) for 5-7 days after the procedure unless told otherwise by your endoscopic and primary care physicians.    Some patients have a temporary sore throat after the procedure; this is a normal finding and over-the-counter lozenges help soothe symptoms.    You may resume normal diet once you are discharged.     Avoid red-colored foods for 24 hours.     You may resume your blood thinner medications (if applicable) as directed by your endoscopic and primary care physicians.    It is normal to have a very small amount of blood-tinged sputum immediately after the procedure. This should resolve within 3-4 days.    Although complications are rare, there is a small risk of pain, collapsed lung, bleeding and infection. Contact your endoscopic physician if you have these signs or symptoms (Dr. Mock):  Temperature greater than 102°F  Worsening pain not relieved by medications  Go to your nearest emergency room is you experience:  Shaking, chills or a temperature over 102°F.    New, sudden difficulty breathing.    New pain when taking a deep breath.    New, sudden chest pain.  Worsening cough that produces large amounts of blood.    Resume Eliquis on 9/7/2024

## 2024-09-06 NOTE — TELEPHONE ENCOUNTER
CALLED AND SPOKE TO OLY AT DR HADLEY OFFICE, I LET HER KNOW THAT WE ARE CALLING AND GETTING THE INFORMATION WE NEEDED OVER THE PHONE FOR HIS CSCOPE. AFTER LOOKING OVER THE REFERRAL WE DID SEE THE REASON FOR THE DELAY WAS BECAUSE IT WAS SENT TO [facility] AND NOT OUR OFFICE. SHE UNDERSTOOD.

## 2024-09-06 NOTE — ANESTHESIA PROCEDURE NOTES
Airway  Urgency: elective    Date/Time: 9/6/2024 9:13 AM  Airway not difficult    General Information and Staff    Patient location during procedure: OR  Anesthesiologist: Owen Llanes MD  CRNA/CAA: Amilcar López CRNA    Indications and Patient Condition  Indications for airway management: airway protection    Preoxygenated: yes  Mask difficulty assessment: 0 - not attempted    Final Airway Details  Final airway type: supraglottic airway      Successful airway: LMA and I-gel  Size 5     Number of attempts at approach: 1  Assessment: lips, teeth, and gum same as pre-op and atraumatic intubation

## 2024-09-06 NOTE — TELEPHONE ENCOUNTER
Called Betty back. She stated patient needed to be cleared by  per  so that his cancer treatments can continue. Rescheduled patient for sooner appt of 09/10/2024 at 9:45 am. Nurse Betty will relay message to patient. Sent message to  to review  message.

## 2024-09-06 NOTE — ANESTHESIA POSTPROCEDURE EVALUATION
Patient: Roby Ott    Procedure Summary       Date: 09/06/24 Room / Location: [facility] ENDOSCOPY 7 / [facility] ENDOSCOPY    Anesthesia Start: 0906 Anesthesia Stop: 0940    Procedure: BRONCHOSCOPY WITH ENDOBRONCHIAL ULTRASOUND (Bronchus) Diagnosis:       Malignant neoplasm of lower lobe of right lung      (Malignant neoplasm of lower lobe of right lung [C34.31])    Surgeons: Terrence Mock MD Provider: Owen Llanes MD    Anesthesia Type: general ASA Status: 3            Anesthesia Type: general    Vitals  Vitals Value Taken Time   /74 09/06/24 0958   Temp     Pulse 75 09/06/24 0958   Resp 18 09/06/24 0958   SpO2 98 % 09/06/24 0958           Post Anesthesia Care and Evaluation    Patient location during evaluation: PHASE II  Patient participation: complete - patient participated  Level of consciousness: awake and alert  Pain management: adequate    Airway patency: patent  Anesthetic complications: No anesthetic complications  PONV Status: none  Cardiovascular status: acceptable and hemodynamically stable  Respiratory status: acceptable, nonlabored ventilation and spontaneous ventilation  Hydration status: acceptable

## 2024-09-06 NOTE — TELEPHONE ENCOUNTER
----- Message from Betty LYNCH sent at 9/6/2024  8:59 AM EDT -----  Good morning. I am the oncology nurse navigator for this patient. Dr. Mock placed a referral to your office to further investigate an area of concern on this patient's PET scan. I am trying to see when we can get this patient in/scheduled for colonoscopy so we can complete his cancer staging work up.  Please advise when he can be scheduled. Thank you!    Betty Barros RN, BSN, OCN, [facility]  [place] [address]  [phone] Office  [email or web address]

## 2024-09-06 NOTE — TELEPHONE ENCOUNTER
Spoke with Betty who is the patient's cancer nurse navigator, the patient was originally scheduled to see Dr. Davis on 10/09/24 Betty was calling to request a sooner appointment as they cannot finalize the patient's cancer treatment plan until he sees Dr. Davis. I moved the appointment up to 09/27/24 but Betty wanted to see if we can move it up any sooner than that for the patient due to the situation, please call and advise thanks!

## 2024-09-06 NOTE — OP NOTE
Operative Note     Date of procedure: 9/6/2024     Patient name: Roby Ott  MRN: [id number]    Pre OP diagnosis:  Patient Active Problem List   Diagnosis    Right lower lobe lung mass    Borderline type 2 diabetes mellitus    Tobacco abuse    Malignant neoplasm of lower lobe of right lung    History of stroke    Cerebrovascular accident (CVA) due to bilateral embolism of middle cerebral arteries       Post OP diagnosis:  Patient Active Problem List   Diagnosis    Right lower lobe lung mass    Borderline type 2 diabetes mellitus    Tobacco abuse    Malignant neoplasm of lower lobe of right lung    History of stroke    Cerebrovascular accident (CVA) due to bilateral embolism of middle cerebral arteries       Procedure performed:   Flexible bronchoscopy.  Endobronchial ultrasound for mediastinal lymph node assessment.    Indications:   Roby Ott is a 69-year-old male who has significant medical problems as mentioned in the medical chart.      In July 2024, he was admitted to hospital for stroke.  As a part of workup, CT scan of the chest was performed which revealed multi lobulated 3.8 x 3.2 cm mass in the right lower lobe concerning for primary lung malignancy.  He underwent CT-guided biopsy on 7/22/2024 which confirmed adenocarcinoma of pulmonary origin.  PET/CT on 8/12/2024 reported 3.6 x 3.6 cm lobular right lower lobe mass with SUV of 11.5.  There were no hypermetabolic right hilar mediastinal adenopathy.  He also had hypermetabolic activity along the long segment of thickened distal sigmoid colon with SUV of 21.7.  He was referred to thoracic surgery for further evaluation.     He recalls having several minor strokes characterized by transient numbness in his shoulder and arm. However, the numbness from the recent stroke has persisted. His arm function has improved, but he still experiences some discomfort. He notes that his condition improves overnight and he feels better upon waking, but as the day  progresses, the numbness intensifies. He has limited mobility and does not walk much.     He has no history of cancer, liver or kidney issues, or heart attacks. A previous examination of his heart showed no signs of a heart attack. He has never undergone a colonoscopy.    As per my assessment, he has 3.8 cm right lower lobe mass that is biopsy-proven adenocarcinoma.  There were no PET avid mediastinal lymphadenopathy.  MRI of the brain noted 3.9 x 1.5 cm lesion along the right parietal bone that was favored to represent a benign entity such as fibrosis lesion or calvarial hemangioma but the possibility of metastasis could not be ruled out.  He required endobronchial ultrasound for mediastinal staging as per NCCN guidelines. If the lymph nodes are negative for malignancy and if the brain lesion is considered not to be malignant after evaluation by neurosurgery, we could potentially cure his cancer.  I discussed in detail with him surgical resection versus radiation treatment.  Given the recent stroke, the risk of surgery needs to be carefully evaluated. If surgery is deemed too risky, radiation therapy may be an alternative.  He will also need neurology evaluation if we are planning surgical intervention.       Surgeon: Terrence Mock MD     Assistants: There was no qualified assistant was available for this procedure.      Anesthesia: General Anesthesia via Laryngeal mask airway    ASA Class: 3    Procedure Details   On 9/6/2024, the patient was brought to the operating room and placed in the supine position on the operating room table.  General anesthesia was administered via laryngeal mask airway.  Antibiotic for surgical prophylaxis was not indicated due to the nature of the procedure.  Prior to beginning the operation, a time-out was conducted with all members of surgical team present. The patient was identified as Roby Ott, the procedure and the correct site were verified.     I began by performing  flexible bronchoscopy.  A flexible adult bronchoscope was advanced through the laryngeal mask airway.  The vocal cords were examined.  The vocal cords were topicalized with 2% lidocaine.  A complete examination of the trachea and bilateral mainstem and lobar bronchi and all segmental bronchial orifices was performed.  The patient has normal endobronchial anatomy.  All the tracheobronchial tree had mild mucus secretion.  There was no blood, endoluminal lesions or other abnormal findings.  The bronchoscope was removed.    The flexible bronchoscope with the Olympus endobronchial ultrasound was inserted.  There were no pathologically enlarged lymph node identified.  All tracheobronchial tree were cleared from secretions.    The patient was awakened from anesthesia, was extubated without incident, and was transported to the Post Anesthesia Care Unit in stable condition.    Findings:  No pathologically enlarged lymph node identified.    Estimated Blood Loss:  Minimal           Drains: None                 Specimens:   None           Implants: None           Complications: None           Disposition: PACU - hemodynamically stable.           Condition: Stable     Terrence Mock MD   Thoracic Surgeon  [facility]

## 2024-09-06 NOTE — ANESTHESIA PREPROCEDURE EVALUATION
Anesthesia Evaluation     Patient summary reviewed and Nursing notes reviewed   NPO Solid Status: > 8 hours  NPO Liquid Status: > 2 hours           Airway   Mallampati: I  TM distance: >3 FB  Neck ROM: full  No difficulty expected  Dental    (+) edentulous    Pulmonary - normal exam    breath sounds clear to auscultation  (+) a smoker Former, lung cancer,    ROS comment: Right lower lobe lung CA  Cardiovascular - normal exam    ECG reviewed  Rhythm: regular  Rate: normal    (+) hyperlipidemia    ROS comment: EF 60% by ECHO 7/24    Neuro/Psych  (+) TIA, CVA    ROS Comment: No residuals  GI/Hepatic/Renal/Endo    (+) diabetes mellitus type 2    Musculoskeletal     Abdominal  - normal exam   Substance History      OB/GYN          Other      history of cancer      Other Comment: Right lung CA                Anesthesia Plan    ASA 3     general   total IV anesthesia  (Possible LMA/may need GETA if not deep enough for accurate EBUS and biopsy)  intravenous induction     Anesthetic plan, risks, benefits, and alternatives have been provided, discussed and informed consent has been obtained with: patient.      CODE STATUS:

## 2024-09-09 ENCOUNTER — TELEPHONE (OUTPATIENT)
Dept: GASTROENTEROLOGY | Facility: CLINIC | Age: 69
End: 2024-09-09
Payer: MEDICARE

## 2024-09-09 DIAGNOSIS — R94.8 ABNORMAL PET SCAN OF COLON: Primary | ICD-10-CM

## 2024-09-09 PROBLEM — I48.0 PAROXYSMAL ATRIAL FIBRILLATION: Status: ACTIVE | Noted: 2024-09-09

## 2024-09-09 RX ORDER — SODIUM CHLORIDE, SODIUM LACTATE, POTASSIUM CHLORIDE, CALCIUM CHLORIDE 600; 310; 30; 20 MG/100ML; MG/100ML; MG/100ML; MG/100ML
30 INJECTION, SOLUTION INTRAVENOUS CONTINUOUS
OUTPATIENT
Start: 2024-09-09

## 2024-09-09 NOTE — TELEPHONE ENCOUNTER
[facility] for COLONOSCOPY on 9/17/2024  arrive at 7:00  . Sent prep instructions to pt my chart....miralax

## 2024-09-09 NOTE — PROGRESS NOTES
Patient ID: Roby Ott is a 69 y.o. male is being seen for consultation today at the request of Terrence Mock MD for a brain lesion.    Imaging: Last MRI of the brain performed on 08/28/2024    Subjective     The patient is here in regards to   Chief Complaint   Patient presents with    Brain Tumor       History of Present Illness  Roby is scheduled to have a colonoscopy soon and potentially a thoracic surgery for lung cancer.  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  Is unchanged.  He does not have any symptoms from it.  It is occasionally sometimes sore when he lays on it.      While in the room and during my examination of the patient I wore a mask and eye protection.  I washed my hands before and after this patient encounter.  The patient was also wearing a mask.    The following portions of the patient's history were reviewed and updated as appropriate: allergies, current medications, past family history, past medical history, past social history, past surgical history and problem list.    Review of Systems   Constitutional:  Negative for fever.   HENT:  Positive for tinnitus. Negative for congestion.    Eyes:  Negative for visual disturbance.   Respiratory:  Negative for chest tightness and shortness of breath.    Cardiovascular:  Negative for chest pain.   Gastrointestinal:  Negative for nausea and vomiting.   Endocrine: Negative for cold intolerance and heat intolerance.   Genitourinary:  Negative for difficulty urinating.   Musculoskeletal:  Positive for back pain. Negative for neck pain and neck stiffness.   Skin:  Negative for rash.   Allergic/Immunologic: Negative for food allergies.   Neurological:  Positive for weakness (on the left side), numbness (on the left side) and headaches. Negative for dizziness and light-headedness.   Hematological:  Bruises/bleeds easily.   Psychiatric/Behavioral:  Negative for confusion and decreased concentration.         Past  Medical History:   Diagnosis Date    Hyperlipidemia     Right lower lobe lung mass 2024    Stroke 2024    TIA (transient ischemic attack)        No Known Allergies    Family History   Problem Relation Age of Onset    Dementia Mother     Cancer Father         main artery of his heart    Cancer Brother     Malig Hyperthermia Neg Hx        Social History     Socioeconomic History    Marital status:    Tobacco Use    Smoking status: Former     Current packs/day: 0.00     Average packs/day: 2.0 packs/day for 54.5 years (109.1 ttl pk-yrs)     Types: Cigarettes     Start date:      Quit date: 2024     Years since quittin.1     Passive exposure: Past    Smokeless tobacco: Never   Vaping Use    Vaping status: Never Used   Substance and Sexual Activity    Alcohol use: Not Currently    Drug use: Not Currently     Types: Marijuana    Sexual activity: Defer       Past Surgical History:   Procedure Laterality Date    BRONCHOSCOPY N/A 2024    Procedure: BRONCHOSCOPY WITH ENDOBRONCHIAL ULTRASOUND;  Surgeon: Terrence Mock MD;  Location: [facility] ENDOSCOPY;  Service: Pulmonary;  Laterality: N/A;  PRE- LUNG CANCER  POST- SAME    SKIN SURGERY      removal of ingrown hairs on face         Objective     Vitals:    09/10/24 0951   BP: 128/72   Pulse: 85   Resp: 16   Temp: 96.9 °F (36.1 °C)   SpO2: 96%     Body mass index is 23.6 kg/m².    Physical Exam  Constitutional:       Appearance: Normal appearance.   HENT:      Head: Normocephalic and atraumatic.        Comments: Moderate sized palpable osteoid osteoma in the right parietal bone  Eyes:      Extraocular Movements: Extraocular movements intact.      Conjunctiva/sclera: Conjunctivae normal.      Pupils: Pupils are equal, round, and reactive to light.   Cardiovascular:      Rate and Rhythm: Normal rate and regular rhythm.      Pulses: Normal pulses.   Pulmonary:      Breath sounds: Normal breath sounds.   Abdominal:      Palpations: Abdomen is soft.    Musculoskeletal:         General: Normal range of motion.      Cervical back: Normal range of motion and neck supple.   Skin:     General: Skin is warm and dry.   Neurological:      Mental Status: He is alert and oriented to person, place, and time.      Motor: Motor function is intact. No weakness or atrophy.      Coordination: Coordination is intact. Romberg sign negative.      Gait: Gait is intact. Gait normal.      Deep Tendon Reflexes: Reflexes are normal and symmetric.      Reflex Scores:       Tricep reflexes are 2+ on the right side and 2+ on the left side.       Bicep reflexes are 2+ on the right side and 2+ on the left side.       Brachioradialis reflexes are 2+ on the right side and 2+ on the left side.       Patellar reflexes are 2+ on the right side and 2+ on the left side.       Achilles reflexes are 2+ on the right side and 2+ on the left side.        Neurologic Exam     Mental Status   Oriented to person, place, and time.     Cranial Nerves     CN III, IV, VI   Pupils are equal, round, and reactive to light.    Gait, Coordination, and Reflexes     Gait  Gait: normal    Reflexes   Right brachioradialis: 2+  Left brachioradialis: 2+  Right biceps: 2+  Left biceps: 2+  Right triceps: 2+  Left triceps: 2+  Right patellar: 2+  Left patellar: 2+  Right achilles: 2+  Left achilles: 2+      Assessment & Plan   Independent Review of Radiographic Studies:      I personally reviewed the images from the following studies.    CT: CTA of the head and neck was reviewed and shows bony protuberance in the right parietal region,, no significant LDL  MR: MRI of the brain w/wo contrast was reviewed and shows chronic vascular changes in the brain parenchyma with no evidence of contrast-enhancing mass, stable appearing right parietal osteoid osteoma    Assessment/Plan: Has a benign osteoid osteoma in the physical.  No need for any neurosurgical intervention or further workup.  This should not preclude him from having  any other procedures done in regards to his lung surgery.    Medical Decision Making:      Follow-up as needed         Diagnoses and all orders for this visit:    1. Osteoid osteoma (Primary)             Patient Instructions/Recommendations:    Call with any questions or concerns      Alexandr Davis MD  09/10/24  10:09 EDT

## 2024-09-09 NOTE — TELEPHONE ENCOUNTER
Timothy Spencer MD Caldwell, Kaitlyn, RegSched Rep; Mikki Delgado, RegSched Rep; Sumi Ramirez, [facility]; Michelle Simpson, RN; Renu Barboza, RN; 1 other  Caller: Unspecified (3 days ago,  1:25 PM)  FYI, this was sent to me as a routine OA screening colonoscopy.    Thankfully I happened to look through the chart and saw this was an urgent referral placed for abnormal PET scan.   A referral such as this should never be put in as a routine screening, this could significantly delay time to appropriate treatment.    I have placed a case request.  We will need to get OK to hold eliquis for 48hrs prior to colonoscopy.  I could perform colonoscopy this Thursday if pt is available.

## 2024-09-10 ENCOUNTER — OFFICE VISIT (OUTPATIENT)
Dept: NEUROSURGERY | Facility: CLINIC | Age: 69
End: 2024-09-10
Payer: MEDICARE

## 2024-09-10 VITALS
HEIGHT: 73 IN | DIASTOLIC BLOOD PRESSURE: 72 MMHG | BODY MASS INDEX: 23.71 KG/M2 | TEMPERATURE: 96.9 F | RESPIRATION RATE: 16 BRPM | HEART RATE: 85 BPM | WEIGHT: 178.9 LBS | OXYGEN SATURATION: 96 % | SYSTOLIC BLOOD PRESSURE: 128 MMHG

## 2024-09-10 DIAGNOSIS — D16.9 OSTEOID OSTEOMA: Primary | ICD-10-CM

## 2024-09-10 PROCEDURE — 1159F MED LIST DOCD IN RCRD: CPT | Performed by: NEUROLOGICAL SURGERY

## 2024-09-10 PROCEDURE — 1160F RVW MEDS BY RX/DR IN RCRD: CPT | Performed by: NEUROLOGICAL SURGERY

## 2024-09-10 PROCEDURE — 99203 OFFICE O/P NEW LOW 30 MIN: CPT | Performed by: NEUROLOGICAL SURGERY

## 2024-09-12 ENCOUNTER — PATIENT OUTREACH (OUTPATIENT)
Dept: OTHER | Facility: HOSPITAL | Age: 69
End: 2024-09-12
Payer: MEDICARE

## 2024-09-12 ENCOUNTER — DOCUMENTATION (OUTPATIENT)
Dept: OTHER | Facility: HOSPITAL | Age: 69
End: 2024-09-12
Payer: MEDICARE

## 2024-09-12 ENCOUNTER — OFFICE VISIT (OUTPATIENT)
Dept: OTHER | Facility: HOSPITAL | Age: 69
End: 2024-09-12
Payer: MEDICARE

## 2024-09-12 VITALS
OXYGEN SATURATION: 93 % | TEMPERATURE: 98.2 F | HEART RATE: 97 BPM | SYSTOLIC BLOOD PRESSURE: 131 MMHG | RESPIRATION RATE: 13 BRPM | DIASTOLIC BLOOD PRESSURE: 77 MMHG

## 2024-09-12 DIAGNOSIS — C34.31 CANCER OF BRONCHUS OF RIGHT LOWER LOBE: Primary | ICD-10-CM

## 2024-09-12 PROCEDURE — 99214 OFFICE O/P EST MOD 30 MIN: CPT | Performed by: SURGERY

## 2024-09-12 PROCEDURE — G0463 HOSPITAL OUTPT CLINIC VISIT: HCPCS | Performed by: SURGERY

## 2024-09-12 NOTE — PROGRESS NOTES
I accompanied patient to Dr. Mock's clinic visit today.  Dr. Mock discussed treatment options including surgery or radiation. Dr. Mock needs to speak with his neurologist due to his recent stroke prior to pursuing treatment. He also needs to review his PFTs.  Dr. Mock will contact the patient after he has done this to discuss next steps.     The patient met with Dr. Davis this week and is scheduled for colonoscopy on 9/17. We discussed the prep for this procedure.      BEAR Meehan met with the patient prior to his visit with Dr. Mock.     The patient denies any questions/concerns or ongoing resource needs. I will continue to follow; encouraged patient to call as needed.

## 2024-09-12 NOTE — PROGRESS NOTES
Oncology Social Work     OSW met with patient while in lung clinic. OSW gave patient the [employer] financial aid application and explained the application. Once application is completed, patient will call this OSW.     Shefali PETERSON

## 2024-09-13 ENCOUNTER — PREP FOR SURGERY (OUTPATIENT)
Dept: OTHER | Facility: HOSPITAL | Age: 69
End: 2024-09-13
Payer: MEDICARE

## 2024-09-13 DIAGNOSIS — C34.31 MALIGNANT NEOPLASM OF LOWER LOBE OF RIGHT LUNG: Primary | ICD-10-CM

## 2024-09-13 DIAGNOSIS — R94.5 ABNORMAL RESULTS OF LIVER FUNCTION STUDIES: ICD-10-CM

## 2024-09-13 RX ORDER — SODIUM CHLORIDE 9 MG/ML
40 INJECTION, SOLUTION INTRAVENOUS AS NEEDED
OUTPATIENT
Start: 2024-09-13

## 2024-09-13 RX ORDER — SODIUM CHLORIDE 0.9 % (FLUSH) 0.9 %
10 SYRINGE (ML) INJECTION AS NEEDED
OUTPATIENT
Start: 2024-09-13

## 2024-09-13 RX ORDER — SODIUM CHLORIDE 0.9 % (FLUSH) 0.9 %
10 SYRINGE (ML) INJECTION EVERY 12 HOURS SCHEDULED
OUTPATIENT
Start: 2024-09-13

## 2024-09-13 NOTE — PROGRESS NOTES
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 male who has significant medical problems as mentioned in the medical chart.     History of Present Illness  Roby Ott is a 69-year-old male who has significant medical problems as mentioned in the medical chart.      In July 2024, he was admitted to hospital for stroke.  As a part of workup, CT scan of the chest was performed which revealed multi lobulated 3.8 x 3.2 cm mass in the right lower lobe concerning for primary lung malignancy.  He underwent CT-guided biopsy on 7/22/2024 which confirmed adenocarcinoma of pulmonary origin.  PET/CT on 8/12/2024 reported 3.6 x 3.6 cm lobular right lower lobe mass with SUV of 11.5.  There were no hypermetabolic right hilar mediastinal adenopathy.  He also had hypermetabolic activity along the long segment of thickened distal sigmoid colon with SUV of 21.7.  He was referred to thoracic surgery for further evaluation.     He recalls having several minor strokes characterized by transient numbness in his shoulder and arm. However, the numbness from the recent stroke has persisted. His arm function has improved, but he still experiences some discomfort. He notes that his condition improves overnight and he feels better upon waking, but as the day progresses, the numbness intensifies. He has limited mobility and does not walk much.     He has no history of cancer, liver or kidney issues, or heart attacks. A previous examination of his heart showed no signs of a heart attack. He has never undergone a colonoscopy.     As per my assessment, he has 3.8 cm right lower lobe mass that is biopsy-proven adenocarcinoma.  There were no PET avid mediastinal lymphadenopathy.  MRI of the brain noted 3.9 x 1.5 cm lesion along the right parietal bone that was favored to  represent a benign entity such as fibrosis lesion or calvarial hemangioma but the possibility of metastasis could not be ruled out.  He required endobronchial ultrasound for mediastinal staging as per NCCN guidelines.  There were no pathological enlarged lymph node noted on endobronchial ultrasound.      He experienced a stroke on 2024, approximately two months ago. He was discharged from the hospital with a heart monitor to wear for 14 days. His neurologist has prescribed Eliquis for stroke prevention.     Past Medical History:   Diagnosis Date    Hyperlipidemia     Right lower lobe lung mass 2024    Stroke 2024    TIA (transient ischemic attack)        Past Surgical History:   Procedure Laterality Date    BRONCHOSCOPY N/A 2024    Procedure: BRONCHOSCOPY WITH ENDOBRONCHIAL ULTRASOUND;  Surgeon: Terrence Mock MD;  Location: [facility] ENDOSCOPY;  Service: Pulmonary;  Laterality: N/A;  PRE- LUNG CANCER  POST- SAME    SKIN SURGERY      removal of ingrown hairs on face       Family History   Problem Relation Age of Onset    Dementia Mother     Cancer Father         main artery of his heart    Cancer Brother     Malig Hyperthermia Neg Hx        Social History     Socioeconomic History    Marital status:    Tobacco Use    Smoking status: Former     Current packs/day: 0.00     Average packs/day: 2.0 packs/day for 54.5 years (109.1 ttl pk-yrs)     Types: Cigarettes     Start date:      Quit date: 2024     Years since quittin.1     Passive exposure: Past    Smokeless tobacco: Never   Vaping Use    Vaping status: Never Used   Substance and Sexual Activity    Alcohol use: Not Currently    Drug use: Not Currently     Types: Marijuana    Sexual activity: Defer         Current Outpatient Medications:     apixaban (ELIQUIS) 5 MG tablet tablet, Take 1 tablet by mouth 2 (Two) Times a Day., Disp: 60 tablet, Rfl: 0    atorvastatin (LIPITOR) 80 MG tablet, Take 1 tablet by mouth Every Night for  90 days., Disp: 90 tablet, Rfl: 0     No Known Allergies          Objective    OBJECTIVE:     VITAL SIGNS:  /77   Pulse 97   Temp 98.2 °F (36.8 °C)   Resp 13   SpO2 93%     PHYSICAL EXAM:  Normal appearance.   Head is normocephalic.   Nose appears normal.   No obvious deformity of the mouth and throat.  Conjunctivae normal.   Heart rate and rhythm is normal.  Pulmonary effort is normal.   Moving all 4 extremities.  Extremities warm.  No focal deficit present.   He is alert and oriented to person, place, and time.     LAB RESULTS:  I have reviewed all the available laboratory results in the chart.    RESULTS REVIEW:  I have reviewed the patient's all relevant laboratory and imaging findings.     Results  Imaging  Ultrasound showed no concerning findings. Brain imaging showed a spot that was not concerning according to the neurosurgeon.    ASSESSMENT & PLAN:  Roby Ott is a 69 y.o. male with significant medical conditions as mentioned above presented to my clinic.    Diagnosis: 3.8 cm adenocarcinoma in the right lower lobe    Assessment & Plan  Given his recent stroke on July 18, there is an increased risk associated with any invasive procedure, including surgery or radiation therapy. A consultation with his neurologist will be arranged to assess the risk of stroke and the feasibility of temporarily discontinuing Eliquis. If the neurologist deems the stroke risk low, surgery will be considered. Additionally, his lung function will be evaluated. If both assessments are favorable, surgery will be pursued. If not, radiation therapy will be considered as an alternative treatment option    I discussed the patients findings and my recommendations with the patient. The patient was given adequate time to ask questions and all questions were answered to patient satisfaction. Thank you for this consult and allowing us to participate in the care of your patient.      Terrence Mock MD  Thoracic Surgeon  [facility]  [facility]        Dictated utilizing Dragon dictation    I spent 30 minutes caring for Roby on this date of service. This time includes time spent by me in the following activities:preparing for the visit, reviewing tests, obtaining and/or reviewing a separately obtained history, performing a medically appropriate examination and/or evaluation , counseling and educating the patient/family/caregiver, ordering medications, tests, or procedures, referring and communicating with other health care professionals , documenting information in the medical record, independently interpreting results and communicating that information with the patient/family/caregiver, and care coordination and more than half the time was spent in direct face to face evaluation and decision making.     Patient or patient representative verbalized consent for the use of Ambient Listening during the visit with  Terrence Mock MD for chart documentation. 10/9/2024  18:01 EDT

## 2024-09-17 ENCOUNTER — ANESTHESIA EVENT (OUTPATIENT)
Dept: GASTROENTEROLOGY | Facility: HOSPITAL | Age: 69
End: 2024-09-17
Payer: MEDICARE

## 2024-09-17 ENCOUNTER — PATIENT OUTREACH (OUTPATIENT)
Dept: OTHER | Facility: HOSPITAL | Age: 69
End: 2024-09-17
Payer: MEDICARE

## 2024-09-17 ENCOUNTER — ANESTHESIA (OUTPATIENT)
Dept: GASTROENTEROLOGY | Facility: HOSPITAL | Age: 69
End: 2024-09-17
Payer: MEDICARE

## 2024-09-17 ENCOUNTER — HOSPITAL ENCOUNTER (OUTPATIENT)
Facility: HOSPITAL | Age: 69
Setting detail: HOSPITAL OUTPATIENT SURGERY
Discharge: HOME | End: 2024-09-17
Attending: INTERNAL MEDICINE | Admitting: INTERNAL MEDICINE
Payer: MEDICARE

## 2024-09-17 VITALS
WEIGHT: 179.5 LBS | RESPIRATION RATE: 16 BRPM | HEART RATE: 57 BPM | OXYGEN SATURATION: 98 % | BODY MASS INDEX: 24.31 KG/M2 | SYSTOLIC BLOOD PRESSURE: 139 MMHG | HEIGHT: 72 IN | DIASTOLIC BLOOD PRESSURE: 80 MMHG

## 2024-09-17 DIAGNOSIS — R94.8 ABNORMAL PET SCAN OF COLON: ICD-10-CM

## 2024-09-17 PROCEDURE — 25010000002 PROPOFOL 10 MG/ML EMULSION: Performed by: NURSE ANESTHETIST, CERTIFIED REGISTERED

## 2024-09-17 PROCEDURE — 25810000003 LACTATED RINGERS PER 1000 ML: Performed by: INTERNAL MEDICINE

## 2024-09-17 PROCEDURE — 88305 TISSUE EXAM BY PATHOLOGIST: CPT | Performed by: INTERNAL MEDICINE

## 2024-09-17 RX ORDER — SODIUM CHLORIDE, SODIUM LACTATE, POTASSIUM CHLORIDE, CALCIUM CHLORIDE 600; 310; 30; 20 MG/100ML; MG/100ML; MG/100ML; MG/100ML
30 INJECTION, SOLUTION INTRAVENOUS CONTINUOUS
Status: DISCONTINUED | OUTPATIENT
Start: 2024-09-17 | End: 2024-09-17 | Stop reason: HOSPADM

## 2024-09-17 RX ORDER — SODIUM CHLORIDE, SODIUM LACTATE, POTASSIUM CHLORIDE, CALCIUM CHLORIDE 600; 310; 30; 20 MG/100ML; MG/100ML; MG/100ML; MG/100ML
1000 INJECTION, SOLUTION INTRAVENOUS CONTINUOUS
Status: DISCONTINUED | OUTPATIENT
Start: 2024-09-17 | End: 2024-09-17 | Stop reason: HOSPADM

## 2024-09-17 RX ORDER — PROPOFOL 10 MG/ML
VIAL (ML) INTRAVENOUS AS NEEDED
Status: DISCONTINUED | OUTPATIENT
Start: 2024-09-17 | End: 2024-09-17 | Stop reason: SURG

## 2024-09-17 RX ORDER — LIDOCAINE HYDROCHLORIDE 20 MG/ML
INJECTION, SOLUTION INFILTRATION; PERINEURAL AS NEEDED
Status: DISCONTINUED | OUTPATIENT
Start: 2024-09-17 | End: 2024-09-17 | Stop reason: SURG

## 2024-09-17 RX ADMIN — SODIUM CHLORIDE, POTASSIUM CHLORIDE, SODIUM LACTATE AND CALCIUM CHLORIDE 30 ML/HR: 600; 310; 30; 20 INJECTION, SOLUTION INTRAVENOUS at 07:37

## 2024-09-17 RX ADMIN — LIDOCAINE HYDROCHLORIDE 60 MG: 20 INJECTION, SOLUTION INFILTRATION; PERINEURAL at 08:03

## 2024-09-17 RX ADMIN — PROPOFOL 140 MCG/KG/MIN: 10 INJECTION, EMULSION INTRAVENOUS at 08:03

## 2024-09-17 RX ADMIN — PROPOFOL 80 MG: 10 INJECTION, EMULSION INTRAVENOUS at 08:03

## 2024-09-18 ENCOUNTER — PRE-ADMISSION TESTING (OUTPATIENT)
Dept: PREADMISSION TESTING | Facility: HOSPITAL | Age: 69
End: 2024-09-18
Payer: MEDICARE

## 2024-09-18 ENCOUNTER — HOSPITAL ENCOUNTER (OUTPATIENT)
Dept: GENERAL RADIOLOGY | Facility: HOSPITAL | Age: 69
Discharge: HOME | End: 2024-09-18
Payer: MEDICARE

## 2024-09-18 VITALS
BODY MASS INDEX: 23.46 KG/M2 | RESPIRATION RATE: 20 BRPM | OXYGEN SATURATION: 96 % | SYSTOLIC BLOOD PRESSURE: 135 MMHG | DIASTOLIC BLOOD PRESSURE: 86 MMHG | HEART RATE: 85 BPM | WEIGHT: 177 LBS | TEMPERATURE: 97.5 F | HEIGHT: 73 IN

## 2024-09-18 DIAGNOSIS — R94.5 ABNORMAL RESULTS OF LIVER FUNCTION STUDIES: ICD-10-CM

## 2024-09-18 DIAGNOSIS — C34.31 MALIGNANT NEOPLASM OF LOWER LOBE OF RIGHT LUNG: ICD-10-CM

## 2024-09-18 LAB
ABO GROUP BLD: NORMAL
ALBUMIN SERPL-MCNC: 4 G/DL (ref 3.5–5.2)
ALBUMIN/GLOB SERPL: 1.5 G/DL
ALP SERPL-CCNC: 125 U/L (ref 39–117)
ALT SERPL W P-5'-P-CCNC: 18 U/L (ref 1–41)
ANION GAP SERPL CALCULATED.3IONS-SCNC: 10.5 MMOL/L (ref 5–15)
AST SERPL-CCNC: 43 U/L (ref 1–40)
BILIRUB SERPL-MCNC: 1 MG/DL (ref 0–1.2)
BLD GP AB SCN SERPL QL: NEGATIVE
BUN SERPL-MCNC: 10 MG/DL (ref 8–23)
BUN/CREAT SERPL: 11 (ref 7–25)
CALCIUM SPEC-SCNC: 9 MG/DL (ref 8.6–10.5)
CHLORIDE SERPL-SCNC: 104 MMOL/L (ref 98–107)
CO2 SERPL-SCNC: 24.5 MMOL/L (ref 22–29)
CREAT SERPL-MCNC: 0.91 MG/DL (ref 0.76–1.27)
CYTO UR: NORMAL
DEPRECATED RDW RBC AUTO: 44.1 FL (ref 37–54)
EGFRCR SERPLBLD CKD-EPI 2021: 90.7 ML/MIN/1.73
ERYTHROCYTE [DISTWIDTH] IN BLOOD BY AUTOMATED COUNT: 12.6 % (ref 12.3–15.4)
GLOBULIN UR ELPH-MCNC: 2.6 GM/DL
GLUCOSE SERPL-MCNC: 143 MG/DL (ref 65–99)
HCT VFR BLD AUTO: 40.9 % (ref 37.5–51)
HGB BLD-MCNC: 13.6 G/DL (ref 13–17.7)
INR PPP: 1 (ref 0.9–1.1)
LAB AP CASE REPORT: NORMAL
MCH RBC QN AUTO: 31.3 PG (ref 26.6–33)
MCHC RBC AUTO-ENTMCNC: 33.3 G/DL (ref 31.5–35.7)
MCV RBC AUTO: 94 FL (ref 79–97)
PATH REPORT.FINAL DX SPEC: NORMAL
PATH REPORT.GROSS SPEC: NORMAL
PLATELET # BLD AUTO: 215 10*3/MM3 (ref 140–450)
PMV BLD AUTO: 10.2 FL (ref 6–12)
POTASSIUM SERPL-SCNC: 3.8 MMOL/L (ref 3.5–5.2)
PROT SERPL-MCNC: 6.6 G/DL (ref 6–8.5)
PROTHROMBIN TIME: 13.4 SECONDS (ref 11.7–14.2)
RBC # BLD AUTO: 4.35 10*6/MM3 (ref 4.14–5.8)
RH BLD: POSITIVE
SODIUM SERPL-SCNC: 139 MMOL/L (ref 136–145)
T&S EXPIRATION DATE: NORMAL
WBC NRBC COR # BLD AUTO: 7.58 10*3/MM3 (ref 3.4–10.8)

## 2024-09-18 PROCEDURE — 85027 COMPLETE CBC AUTOMATED: CPT

## 2024-09-18 PROCEDURE — 80053 COMPREHEN METABOLIC PANEL: CPT

## 2024-09-18 PROCEDURE — 86900 BLOOD TYPING SEROLOGIC ABO: CPT

## 2024-09-18 PROCEDURE — 71046 X-RAY EXAM CHEST 2 VIEWS: CPT

## 2024-09-18 PROCEDURE — 85610 PROTHROMBIN TIME: CPT

## 2024-09-18 PROCEDURE — 36415 COLL VENOUS BLD VENIPUNCTURE: CPT

## 2024-09-18 PROCEDURE — 86901 BLOOD TYPING SEROLOGIC RH(D): CPT

## 2024-09-18 PROCEDURE — 86850 RBC ANTIBODY SCREEN: CPT

## 2024-09-20 ENCOUNTER — TELEPHONE (OUTPATIENT)
Dept: SURGERY | Facility: CLINIC | Age: 69
End: 2024-09-20
Payer: MEDICARE

## 2024-09-20 ENCOUNTER — TELEPHONE (OUTPATIENT)
Dept: NEUROLOGY | Facility: CLINIC | Age: 69
End: 2024-09-20
Payer: MEDICARE

## 2024-09-23 ENCOUNTER — TELEPHONE (OUTPATIENT)
Dept: SURGERY | Facility: CLINIC | Age: 69
End: 2024-09-23
Payer: MEDICARE

## 2024-09-23 ENCOUNTER — TELEPHONE (OUTPATIENT)
Dept: OTHER | Facility: HOSPITAL | Age: 69
End: 2024-09-23
Payer: MEDICARE

## 2024-09-24 ENCOUNTER — OFFICE VISIT (OUTPATIENT)
Dept: FAMILY MEDICINE CLINIC | Facility: CLINIC | Age: 69
End: 2024-09-24
Payer: MEDICARE

## 2024-09-24 ENCOUNTER — TELEPHONE (OUTPATIENT)
Dept: NEUROLOGY | Facility: CLINIC | Age: 69
End: 2024-09-24
Payer: MEDICARE

## 2024-09-24 VITALS
BODY MASS INDEX: 24.19 KG/M2 | HEART RATE: 69 BPM | OXYGEN SATURATION: 96 % | DIASTOLIC BLOOD PRESSURE: 68 MMHG | WEIGHT: 182.5 LBS | HEIGHT: 73 IN | SYSTOLIC BLOOD PRESSURE: 120 MMHG

## 2024-09-24 DIAGNOSIS — I63.9 CEREBROVASCULAR ACCIDENT (CVA), UNSPECIFIED MECHANISM: Primary | ICD-10-CM

## 2024-09-24 DIAGNOSIS — I48.0 PAROXYSMAL ATRIAL FIBRILLATION: ICD-10-CM

## 2024-09-24 DIAGNOSIS — C34.31 MALIGNANT NEOPLASM OF LOWER LOBE OF RIGHT LUNG: ICD-10-CM

## 2024-09-24 PROCEDURE — 1126F AMNT PAIN NOTED NONE PRSNT: CPT | Performed by: NURSE PRACTITIONER

## 2024-09-24 PROCEDURE — 99212 OFFICE O/P EST SF 10 MIN: CPT | Performed by: NURSE PRACTITIONER

## 2024-09-24 PROCEDURE — 1159F MED LIST DOCD IN RCRD: CPT | Performed by: NURSE PRACTITIONER

## 2024-09-24 PROCEDURE — 1160F RVW MEDS BY RX/DR IN RCRD: CPT | Performed by: NURSE PRACTITIONER

## 2024-09-26 ENCOUNTER — OFFICE VISIT (OUTPATIENT)
Dept: SURGERY | Facility: CLINIC | Age: 69
End: 2024-09-26
Payer: MEDICARE

## 2024-09-26 ENCOUNTER — DOCUMENTATION (OUTPATIENT)
Dept: OTHER | Facility: HOSPITAL | Age: 69
End: 2024-09-26
Payer: MEDICARE

## 2024-09-26 VITALS
WEIGHT: 184 LBS | HEART RATE: 101 BPM | OXYGEN SATURATION: 96 % | SYSTOLIC BLOOD PRESSURE: 140 MMHG | DIASTOLIC BLOOD PRESSURE: 80 MMHG | BODY MASS INDEX: 24.28 KG/M2

## 2024-09-26 DIAGNOSIS — R91.8 RIGHT LOWER LOBE LUNG MASS: Primary | ICD-10-CM

## 2024-09-26 DIAGNOSIS — C34.31 CANCER OF BRONCHUS OF RIGHT LOWER LOBE: Primary | ICD-10-CM

## 2024-09-27 ENCOUNTER — HOSPITAL ENCOUNTER (OUTPATIENT)
Dept: RADIATION ONCOLOGY | Facility: HOSPITAL | Age: 69
Setting detail: RADIATION/ONCOLOGY SERIES
End: 2024-09-27
Payer: MEDICARE

## 2024-09-27 ENCOUNTER — CONSULT (OUTPATIENT)
Dept: RADIATION ONCOLOGY | Facility: HOSPITAL | Age: 69
End: 2024-09-27
Payer: MEDICARE

## 2024-09-27 VITALS
SYSTOLIC BLOOD PRESSURE: 139 MMHG | OXYGEN SATURATION: 97 % | BODY MASS INDEX: 24.44 KG/M2 | HEART RATE: 67 BPM | WEIGHT: 185.2 LBS | DIASTOLIC BLOOD PRESSURE: 76 MMHG

## 2024-09-27 DIAGNOSIS — C34.31 CANCER OF BRONCHUS OF RIGHT LOWER LOBE: Primary | ICD-10-CM

## 2024-09-27 PROCEDURE — G0463 HOSPITAL OUTPT CLINIC VISIT: HCPCS | Performed by: RADIOLOGY

## 2024-09-27 PROCEDURE — 77334 RADIATION TREATMENT AID(S): CPT | Performed by: RADIOLOGY

## 2024-10-01 ENCOUNTER — HOSPITAL ENCOUNTER (OUTPATIENT)
Dept: RADIATION ONCOLOGY | Facility: HOSPITAL | Age: 69
Setting detail: RADIATION/ONCOLOGY SERIES
End: 2024-10-01
Payer: MEDICARE

## 2024-10-03 PROCEDURE — 77300 RADIATION THERAPY DOSE PLAN: CPT | Performed by: RADIOLOGY

## 2024-10-03 PROCEDURE — 77301 RADIOTHERAPY DOSE PLAN IMRT: CPT | Performed by: RADIOLOGY

## 2024-10-03 PROCEDURE — 77338 DESIGN MLC DEVICE FOR IMRT: CPT | Performed by: RADIOLOGY

## 2024-10-03 PROCEDURE — 77293 RESPIRATOR MOTION MGMT SIMUL: CPT | Performed by: RADIOLOGY

## 2024-10-07 ENCOUNTER — TELEPHONE (OUTPATIENT)
Dept: GASTROENTEROLOGY | Facility: CLINIC | Age: 69
End: 2024-10-07
Payer: MEDICARE

## 2024-10-07 ENCOUNTER — PATIENT OUTREACH (OUTPATIENT)
Dept: OTHER | Facility: HOSPITAL | Age: 69
End: 2024-10-07
Payer: MEDICARE

## 2024-10-07 ENCOUNTER — HOSPITAL ENCOUNTER (OUTPATIENT)
Dept: RADIATION ONCOLOGY | Facility: HOSPITAL | Age: 69
Discharge: HOME | End: 2024-10-07
Payer: MEDICARE

## 2024-10-07 DIAGNOSIS — K59.00 CONSTIPATION, UNSPECIFIED CONSTIPATION TYPE: ICD-10-CM

## 2024-10-07 DIAGNOSIS — R10.9 RIGHT SIDED ABDOMINAL PAIN: ICD-10-CM

## 2024-10-07 DIAGNOSIS — K62.5 RECTAL BLEEDING: Primary | ICD-10-CM

## 2024-10-07 LAB
RAD ONC ARIA COURSE ID: NORMAL
RAD ONC ARIA COURSE INTENT: NORMAL
RAD ONC ARIA COURSE LAST TREATMENT DATE: NORMAL
RAD ONC ARIA COURSE START DATE: NORMAL
RAD ONC ARIA COURSE TREATMENT ELAPSED DAYS: 0
RAD ONC ARIA FIRST TREATMENT DATE: NORMAL
RAD ONC ARIA PLAN FRACTIONS TREATED TO DATE: 1
RAD ONC ARIA PLAN ID: NORMAL
RAD ONC ARIA PLAN PRESCRIBED DOSE PER FRACTION: 10 GY
RAD ONC ARIA PLAN PRIMARY REFERENCE POINT: NORMAL
RAD ONC ARIA PLAN TOTAL FRACTIONS PRESCRIBED: 5
RAD ONC ARIA PLAN TOTAL PRESCRIBED DOSE: 5000 CGY
RAD ONC ARIA REFERENCE POINT DOSAGE GIVEN TO DATE: 10 GY
RAD ONC ARIA REFERENCE POINT ID: NORMAL
RAD ONC ARIA REFERENCE POINT SESSION DOSAGE GIVEN: 10 GY

## 2024-10-07 PROCEDURE — 77435 SBRT MANAGEMENT: CPT | Performed by: RADIOLOGY

## 2024-10-07 PROCEDURE — 77373 STRTCTC BDY RAD THER TX DLVR: CPT | Performed by: RADIOLOGY

## 2024-10-07 NOTE — PROGRESS NOTES
Reviewed chart    Met patient in radiation after his first radiation treatment. He will be receiving 5 treatments.  We discussed his first surveillance scan usually 3 months after completion of treatment. The patient verbalized understanding.    We discussed a few medication questions that he had.    The patient has met with Shefali for financial assistance. He stated his FA was approved.    The patient denies any questions/concerns or ongoing resource needs. I will continue to follow; encouraged patient to call as needed.

## 2024-10-07 NOTE — TELEPHONE ENCOUNTER
Timothy Spencer MD  [facility] Pool  Benign polyps  Recommend CT scan A/P with IV/oral contrast in 8 week please and office f/u with SM/BG

## 2024-10-07 NOTE — TELEPHONE ENCOUNTER
Hub staff attempted to follow warm transfer process and was unsuccessful     Caller: CHUCK MITCHELL     Relationship to patient: SELF    Best call back number: [phone]     Patient is needing: PT IS CALLING JORGE L COOLEY BACK PLEASE ADVISE

## 2024-10-08 ENCOUNTER — HOSPITAL ENCOUNTER (OUTPATIENT)
Dept: RADIATION ONCOLOGY | Facility: HOSPITAL | Age: 69
Discharge: HOME | End: 2024-10-08

## 2024-10-08 LAB

## 2024-10-08 PROCEDURE — 77373 STRTCTC BDY RAD THER TX DLVR: CPT | Performed by: RADIOLOGY

## 2024-10-08 NOTE — TELEPHONE ENCOUNTER
Called patient's cell phone number 3 times. Phone call dropped each time.   Call home number and left message requesting call back.

## 2024-10-08 NOTE — TELEPHONE ENCOUNTER
Patient called. Advised as per Dr. Spencer's note. He verb understanding.   F/u with Joanie on 11/27@8609.

## 2024-10-08 NOTE — TELEPHONE ENCOUNTER
Patient called in returning Michelle's call. Please advise and call patient back. He can't see caller ID and is why he hasn't answered.

## 2024-10-09 ENCOUNTER — HOSPITAL ENCOUNTER (OUTPATIENT)
Dept: RADIATION ONCOLOGY | Facility: HOSPITAL | Age: 69
Discharge: HOME | End: 2024-10-09

## 2024-10-09 LAB
RAD ONC ARIA COURSE ID: NORMAL
RAD ONC ARIA COURSE INTENT: NORMAL
RAD ONC ARIA COURSE LAST TREATMENT DATE: NORMAL
RAD ONC ARIA COURSE START DATE: NORMAL
RAD ONC ARIA COURSE TREATMENT ELAPSED DAYS: 2
RAD ONC ARIA FIRST TREATMENT DATE: NORMAL
RAD ONC ARIA PLAN FRACTIONS TREATED TO DATE: 3
RAD ONC ARIA PLAN ID: NORMAL
RAD ONC ARIA PLAN PRESCRIBED DOSE PER FRACTION: 10 GY
RAD ONC ARIA PLAN PRIMARY REFERENCE POINT: NORMAL
RAD ONC ARIA PLAN TOTAL FRACTIONS PRESCRIBED: 5
RAD ONC ARIA PLAN TOTAL PRESCRIBED DOSE: 5000 CGY
RAD ONC ARIA REFERENCE POINT DOSAGE GIVEN TO DATE: 30 GY
RAD ONC ARIA REFERENCE POINT ID: NORMAL
RAD ONC ARIA REFERENCE POINT SESSION DOSAGE GIVEN: 10 GY

## 2024-10-09 PROCEDURE — 77373 STRTCTC BDY RAD THER TX DLVR: CPT | Performed by: RADIOLOGY

## 2024-10-09 PROCEDURE — 77336 RADIATION PHYSICS CONSULT: CPT | Performed by: RADIOLOGY

## 2024-10-10 ENCOUNTER — HOSPITAL ENCOUNTER (OUTPATIENT)
Dept: RADIATION ONCOLOGY | Facility: HOSPITAL | Age: 69
Discharge: HOME | End: 2024-10-10

## 2024-10-10 ENCOUNTER — CALL CENTER PROGRAMS (OUTPATIENT)
Dept: CALL CENTER | Facility: HOSPITAL | Age: 69
End: 2024-10-10
Payer: MEDICARE

## 2024-10-10 LAB
RAD ONC ARIA COURSE ID: NORMAL
RAD ONC ARIA COURSE INTENT: NORMAL
RAD ONC ARIA COURSE LAST TREATMENT DATE: NORMAL
RAD ONC ARIA COURSE START DATE: NORMAL
RAD ONC ARIA COURSE TREATMENT ELAPSED DAYS: 3
RAD ONC ARIA FIRST TREATMENT DATE: NORMAL
RAD ONC ARIA PLAN FRACTIONS TREATED TO DATE: 4
RAD ONC ARIA PLAN ID: NORMAL
RAD ONC ARIA PLAN PRESCRIBED DOSE PER FRACTION: 10 GY
RAD ONC ARIA PLAN PRIMARY REFERENCE POINT: NORMAL
RAD ONC ARIA PLAN TOTAL FRACTIONS PRESCRIBED: 5
RAD ONC ARIA PLAN TOTAL PRESCRIBED DOSE: 5000 CGY
RAD ONC ARIA REFERENCE POINT DOSAGE GIVEN TO DATE: 40 GY
RAD ONC ARIA REFERENCE POINT ID: NORMAL
RAD ONC ARIA REFERENCE POINT SESSION DOSAGE GIVEN: 9.52 GY

## 2024-10-10 PROCEDURE — 77373 STRTCTC BDY RAD THER TX DLVR: CPT | Performed by: RADIOLOGY

## 2024-10-10 NOTE — OUTREACH NOTE
Stroke Kat Survey      Flowsheet Row Responses   Facility patient discharged from? [place]   Attempt successful Yes   Call start time 1406   Person spoke with today (if not patient) and relationship Patient   Call end time 1414   Patient location 30 days post discharge if known Home   Was the patient readmitted within 30 days of discharge? No   Could you live alone without any help from another person? Yes  [Independent with ADL's.]   Can you do everything that you were doing right before your stroke even if slower and not as much? Yes   Are you completely back to the way you were right before your stroke? No  [Patient reports that his left arm isn't completely back to the way it was before the stroke. He reports that he still has some weakness.]   Can you walk from one room to another without help from another person? Yes  [Walks independently.]   Can the patient sit up in bed without any help? Yes   Call Center Beulah Score 1   Beulah score call completed Yes   Comments patient reports good recovery from stroke except that he still has some residual weakness to his left arm.            CLAUDIO RAI - Registered Nurse

## 2024-10-11 ENCOUNTER — RADIATION ONCOLOGY WEEKLY ASSESSMENT (OUTPATIENT)
Dept: RADIATION ONCOLOGY | Facility: HOSPITAL | Age: 69
End: 2024-10-11
Payer: MEDICARE

## 2024-10-11 ENCOUNTER — HOSPITAL ENCOUNTER (OUTPATIENT)
Dept: RADIATION ONCOLOGY | Facility: HOSPITAL | Age: 69
Discharge: HOME | End: 2024-10-11

## 2024-10-11 VITALS
BODY MASS INDEX: 24.2 KG/M2 | OXYGEN SATURATION: 98 % | SYSTOLIC BLOOD PRESSURE: 129 MMHG | DIASTOLIC BLOOD PRESSURE: 66 MMHG | HEART RATE: 101 BPM | WEIGHT: 183.4 LBS

## 2024-10-11 DIAGNOSIS — C34.31 CANCER OF BRONCHUS OF RIGHT LOWER LOBE: Primary | ICD-10-CM

## 2024-10-11 LAB
RAD ONC ARIA COURSE ID: NORMAL
RAD ONC ARIA COURSE INTENT: NORMAL
RAD ONC ARIA COURSE LAST TREATMENT DATE: NORMAL
RAD ONC ARIA COURSE START DATE: NORMAL
RAD ONC ARIA COURSE TREATMENT ELAPSED DAYS: 4
RAD ONC ARIA FIRST TREATMENT DATE: NORMAL
RAD ONC ARIA PLAN FRACTIONS TREATED TO DATE: 5
RAD ONC ARIA PLAN ID: NORMAL
RAD ONC ARIA PLAN PRESCRIBED DOSE PER FRACTION: 10 GY
RAD ONC ARIA PLAN PRIMARY REFERENCE POINT: NORMAL
RAD ONC ARIA PLAN TOTAL FRACTIONS PRESCRIBED: 5
RAD ONC ARIA PLAN TOTAL PRESCRIBED DOSE: 5000 CGY
RAD ONC ARIA REFERENCE POINT DOSAGE GIVEN TO DATE: 50 GY
RAD ONC ARIA REFERENCE POINT ID: NORMAL
RAD ONC ARIA REFERENCE POINT SESSION DOSAGE GIVEN: 10 GY

## 2024-10-11 PROCEDURE — 77373 STRTCTC BDY RAD THER TX DLVR: CPT | Performed by: RADIOLOGY

## 2024-10-11 NOTE — PROGRESS NOTES
Radiation Oncology  On-Treatment Note      Patient: Roby Ott    MRN: [id number]    Attending Physician: Sea Beck MD     Diagnosis:     ICD-10-CM ICD-9-CM   1. Cancer of bronchus of right lower lobe  C34.31 162.5       Radiation Therapy Visit:  Films reviewed and remains acceptable, Pain assessed, Pain management planned, Radiation dose schedule reviewed and remains acceptable, Radiation technique remains acceptable, and Symptoms within expected range    Radiation Treatments       Active   Reference Points   Rx Rt Lower Lobe   Most recent treatment: Dose given: 1,000 cGy (on 10/11/2024)   Total: Dose given: 5,000 cGy   Elapsed Days: 4                 Mr. Ott is doing very well today.  He denies any significant pain or irritation.  He has slight increase in shortness of breath with exertion.  Fortunately, he has stopped smoking altogether.  As a result, he is tasting food much better and no longer has a morning cough.  Our plans are to see him in 3 months with repeat CT scan prior to visit.      Physical Examination:  Vitals: Blood pressure 129/66, pulse 101, weight 83.2 kg (183 lb 6.4 oz), SpO2 98%.  Pain Score    10/11/24 1151   PainSc: 0-No pain       Fully active, able to carry on all pre-disease performance without restriction = 0    We examined the relevant areas: yes  Findings are within the expected range for this stage of treatment: yes  -------------------------------------------------------------------------------------------------------------------    ACTION ITEMS:  Patient tolerating treatment well and as expected for this stage in their treatment    Estimated Completion Date: today I do do vitals on Mr. Ott yes that is a I can complete my note of the vitals are not know you know back you better-at      Sea Beck MD  Radiation Oncology

## 2024-10-14 ENCOUNTER — TREATMENT (OUTPATIENT)
Dept: RADIATION ONCOLOGY | Facility: CLINIC | Age: 69
End: 2024-10-14
Payer: MEDICARE

## 2024-10-14 DIAGNOSIS — C34.31 CANCER OF BRONCHUS OF RIGHT LOWER LOBE: Primary | ICD-10-CM

## 2024-10-14 NOTE — PROGRESS NOTES
Radiation Treatment Summary Note      Patient Name: Roby Ott  : 1955    Attending Provider: Sea Beck MD      Diagnosis: Stage IA3, T1c N0 M0 adenocarcinoma right lower lobe     ICD-10-CM ICD-9-CM   1. Cancer of bronchus of right lower lobe  C34.31 162.5       Radiation Start Date: 10/7/2024    Radiation Completion Date: 10/11/2024  Mr. Roby Ott is a 69-year-old gentleman who has been a heavy smoker of 2 packs a day for at least 50 years.  He presented with tingling across his chest and into his left arm and part of his evaluation demonstrated a CT scan demonstrating an abnormality in the right lower lobe.  This was followed with PET/CT imaging performed 2024 demonstrating a 3.6 x 3.6 lobular mass.  MRI of the brain was normal.  CT-guided needle biopsy of the right lower lobe performed 2024 was consistent with a bronchogenic adenocarcinoma.  Patient was not felt to be a candidate for surgical resection after evaluation with Dr. Mock and was thus referred for consideration of SBRT radiotherapy.    Prescription:     Site: Right lower lobe  Laterality: N/A  Total Dose: 5000 cGy  Dose per Fraction: 1000 cGy  Total Fractions: 5  Daily or BID: Daily  Modality: 6 MV photons  Technique: SBRT  Bolus: No    Final Delivered Dose Deviated From Initially Prescribed Dose: No    Concurrent Chemotherapy: No    Patient Tolerated Treatment Without Unexpected Side Effects/Complications: Yes    ECOG: Restricted in physically strenuous activity but ambulatory and able to carry out work of a light or sedentary nature, e.g., light house work, office work = 1    Pain Management Plan: None Indicated/PRN OTC    Follow-Up Plan: 3 months    Imaging Ordered for Follow-Up: Yes, describe: Noncontrast CT scan prior to visit        Sea Beck MD

## 2024-10-17 LAB
RAD ONC ARIA COURSE END DATE: NORMAL
RAD ONC ARIA COURSE ID: NORMAL
RAD ONC ARIA COURSE INTENT: NORMAL
RAD ONC ARIA COURSE LAST TREATMENT DATE: NORMAL
RAD ONC ARIA COURSE START DATE: NORMAL
RAD ONC ARIA COURSE TREATMENT ELAPSED DAYS: 4
RAD ONC ARIA FIRST TREATMENT DATE: NORMAL
RAD ONC ARIA PLAN FRACTIONS TREATED TO DATE: 5
RAD ONC ARIA PLAN ID: NORMAL
RAD ONC ARIA PLAN NAME: NORMAL
RAD ONC ARIA PLAN PRESCRIBED DOSE PER FRACTION: 10 GY
RAD ONC ARIA PLAN PRIMARY REFERENCE POINT: NORMAL
RAD ONC ARIA PLAN TOTAL FRACTIONS PRESCRIBED: 5
RAD ONC ARIA PLAN TOTAL PRESCRIBED DOSE: 5000 CGY
RAD ONC ARIA REFERENCE POINT DOSAGE GIVEN TO DATE: 50 GY
RAD ONC ARIA REFERENCE POINT ID: NORMAL

## 2024-10-20 NOTE — PROGRESS NOTES
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 male who has significant medical problems as mentioned in the medical chart.     History of Present Illness  In July 2024, he was admitted to hospital for stroke.  As a part of workup, CT scan of the chest was performed which revealed multi lobulated 3.8 x 3.2 cm mass in the right lower lobe concerning for primary lung malignancy.  He underwent CT-guided biopsy on 7/22/2024 which confirmed adenocarcinoma of pulmonary origin.  PET/CT on 8/12/2024 reported 3.6 x 3.6 cm lobular right lower lobe mass with SUV of 11.5.  There were no hypermetabolic right hilar mediastinal adenopathy.  He also had hypermetabolic activity along the long segment of thickened distal sigmoid colon with SUV of 21.7.  He was referred to thoracic surgery for further evaluation.     He recalls having several minor strokes characterized by transient numbness in his shoulder and arm. However, the numbness from the recent stroke has persisted. His arm function has improved, but he still experiences some discomfort. He notes that his condition improves overnight and he feels better upon waking, but as the day progresses, the numbness intensifies. He has limited mobility and does not walk much.     He has no history of cancer, liver or kidney issues, or heart attacks. A previous examination of his heart showed no signs of a heart attack. He has never undergone a colonoscopy.     As per my assessment, he has 3.8 cm right lower lobe mass that is biopsy-proven adenocarcinoma.  There were no PET avid mediastinal lymphadenopathy.  MRI of the brain noted 3.9 x 1.5 cm lesion along the right parietal bone that was favored to represent a benign entity such as fibrosis lesion or calvarial hemangioma but the possibility of metastasis  could not be ruled out.  He required endobronchial ultrasound for mediastinal staging as per NCCN guidelines.  There were no pathological enlarged lymph node noted on endobronchial ultrasound.       He experienced a stroke on 07/18/2024, approximately two months ago. He was discharged from the hospital with a heart monitor to wear for 14 days. His neurologist has prescribed Eliquis for stroke prevention.     We had quite back-and-forth discussion regarding the risk and benefit of surgical intervention.  Though he had made good recovery after the stroke, I felt that he was still at high risk of decompensation and additional stroke if we proceed with surgical resection.  He was asked to come to clinic to discuss future treatment option.    He has reported pain while walking, which he suspects may be related to his lungs or intestines. He has expressed concerns about cognitive dysfunction following his stroke but reports no memory issues. He has also questioned the necessity of undergoing preoperative tests if the surgery was ultimately canceled.    He has undergone a colonoscopy and is currently on antibiotics for an infection. He has inquired about the results of his colon biopsy, which revealed a grayish substance measuring 35 mm and 25 mm, and whether this could indicate cancer.    He has demonstrated his physical capability by walking around the block. He has asked about the timeline for his first treatment and whether delaying treatment could exacerbate his condition. He has also asked about the opinion of his neurologist regarding his condition.    FAMILY HISTORY  His father had radiation and chemotherapy done on him.    Past Medical History:   Diagnosis Date    COPD (chronic obstructive pulmonary disease)     Hyperlipidemia     Osteoid osteoma     right parietal    Right lower lobe lung mass 07/20/2024    Stroke 07/17/2024    TIA (transient ischemic attack)        Past Surgical History:   Procedure Laterality  Date    BRONCHOSCOPY N/A 2024    Procedure: BRONCHOSCOPY WITH ENDOBRONCHIAL ULTRASOUND;  Surgeon: Terrence Mock MD;  Location: [facility] ENDOSCOPY;  Service: Pulmonary;  Laterality: N/A;  PRE- LUNG CANCER  POST- SAME    COLONOSCOPY      COLONOSCOPY N/A 2024    Procedure: COLONOSCOPY;  Surgeon: Timothy Spencer MD;  Location:  [facility];  Service: Gastroenterology;  Laterality: N/A;  Pre:abnormal PET scan of colon  Post: diverticulosis, diverticulitis, colon polyp    SKIN SURGERY      removal of ingrown hairs on face & warts       Family History   Problem Relation Age of Onset    Dementia Mother     Cancer Father         main artery of his heart    Cancer Brother     Malig Hyperthermia Neg Hx        Social History     Socioeconomic History    Marital status:    Tobacco Use    Smoking status: Former     Current packs/day: 0.00     Average packs/day: 2.0 packs/day for 54.5 years (109.1 ttl pk-yrs)     Types: Cigarettes     Start date:      Quit date: 2024     Years since quittin.2     Passive exposure: Past    Smokeless tobacco: Never    Tobacco comments:     Smoked 2 packs daily  > 50 years   Vaping Use    Vaping status: Never Used   Substance and Sexual Activity    Alcohol use: Not Currently    Drug use: Not Currently     Types: Marijuana    Sexual activity: Defer         Current Outpatient Medications:     apixaban (ELIQUIS) 5 MG tablet tablet, Take 1 tablet by mouth 2 (Two) Times a Day., Disp: 60 tablet, Rfl: 0    atorvastatin (LIPITOR) 80 MG tablet, Take 1 tablet by mouth Every Night for 90 days., Disp: 90 tablet, Rfl: 0     No Known Allergies          Objective    OBJECTIVE:     VITAL SIGNS:  /80 (BP Location: Left arm, Patient Position: Sitting, Cuff Size: Adult)   Pulse 101   Wt 83.5 kg (184 lb)   SpO2 96%   BMI 24.28 kg/m²     PHYSICAL EXAM:  Normal appearance.   Head is normocephalic.   Nose appears normal.   No obvious deformity of the mouth and  throat.  Conjunctivae normal.   Heart rate and rhythm is normal.  Pulmonary effort is normal.   Moving all 4 extremities.  Extremities warm.  No focal deficit present.   He is alert and oriented to person, place, and time.     LAB RESULTS:  I have reviewed all the available laboratory results in the chart.    RESULTS REVIEW:  I have reviewed the patient's all relevant laboratory and imaging findings.     Results  Imaging  PET scan and MRI showed no evidence of cancer outside the lung.    ASSESSMENT & PLAN:  Roby Ott is a 69 y.o. male with significant medical conditions as mentioned above presented to my clinic.    Diagnosis: 3.8 cm adenocarcinoma in the right lower lobe.    Assessment & Plan  1. Lung Cancer.  His lung function is satisfactory, but due to a recent stroke, he is considered high risk for surgery. The consensus among peers is that surgery should be avoided at this time. PET scan and MRI results do not indicate any abnormalities outside the right lung.  The endobronchial ultrasound did not reveal any pathologically enlarged lymph node.  A referral will be made to a radiation oncologist for further evaluation and potential initiation of radiation treatment. Radiation therapy is recommended as it has fewer risks compared to surgery.    2. Recent Stroke.  He had a stroke two months ago, which increases the risk of complications from surgery, including another stroke. The neurologist has been consulted and agrees with the decision to avoid surgery. He is currently on Eliquis to prevent further strokes. The risks and benefits of stopping Eliquis for surgery were discussed, and it was determined that the risk is too high.    3. Colon Polyps.  The recent colonoscopy revealed polyps but no evidence of cancer. The polyps will be monitored, and a follow-up colonoscopy will be scheduled based on the gastroenterologist's recommendation, typically within 3 to 5 years.    I discussed the patients findings and  my recommendations with the patient. The patient was given adequate time to ask questions and all questions were answered to patient satisfaction.    Terrence Mock MD  Thoracic Surgeon  [facility]        Dictated utilizing Dragon dictation    I spent 40 minutes caring for Roby on this date of service. This time includes time spent by me in the following activities:preparing for the visit, reviewing tests, obtaining and/or reviewing a separately obtained history, performing a medically appropriate examination and/or evaluation , counseling and educating the patient/family/caregiver, ordering medications, tests, or procedures, referring and communicating with other health care professionals , documenting information in the medical record, independently interpreting results and communicating that information with the patient/family/caregiver, and care coordination and more than half the time was spent in direct face to face evaluation and decision making.     Patient or patient representative verbalized consent for the use of Ambient Listening during the visit with  Terrence Mock MD for chart documentation. 10/20/2024  16:51 EDT

## 2024-11-07 ENCOUNTER — TELEPHONE (OUTPATIENT)
Dept: NEUROLOGY | Facility: CLINIC | Age: 69
End: 2024-11-07
Payer: MEDICARE

## 2024-11-07 DIAGNOSIS — I48.91 ATRIAL FIBRILLATION, UNSPECIFIED TYPE: ICD-10-CM

## 2024-11-07 NOTE — TELEPHONE ENCOUNTER
Provider: DR BURRELL    Caller: PATIENT    Relationship to Patient: SELF    Pharmacy: [facility] #[id number]    Phone Number: [phone]    Reason for Call: STATES HE HAS BEEN OUT OF ATORVASTATIN FOR ABOUT 10 DAYS. WASN'T SURE IF PROVIDER WANTED HIM TO STAY ON IT. PLEASE REVIEW & ADVISE, THANK YOU.

## 2024-11-07 NOTE — TELEPHONE ENCOUNTER
Caller: Roby Ott    Relationship: Self    Best call back number: [phone]    Requested Prescriptions:   Requested Prescriptions     Pending Prescriptions Disp Refills    apixaban (ELIQUIS) 5 MG tablet tablet 60 tablet 0     Sig: Take 1 tablet by mouth 2 (Two) Times a Day.        Pharmacy where request should be sent: [facility] #[id number] [address]  AT [place] [address] & [place] [address] - [phone] [facility] [phone] FX     Last office visit with prescribing clinician: 8/14/2024   Last telemedicine visit with prescribing clinician: Visit date not found   Next office visit with prescribing clinician: Visit date not found     Additional details provided by patient: WILL BE OUT IN 2 DAYS.    Does the patient have less than a 3 day supply:  [x] Yes  [] No    Would you like a call back once the refill request has been completed: [] Yes [x] No    If the office needs to give you a call back, can they leave a voicemail: [] Yes [x] No    Ronny Nicole Rep   11/07/24 13:07 EST

## 2024-11-08 RX ORDER — ATORVASTATIN CALCIUM 80 MG/1
80 TABLET, FILM COATED ORAL DAILY
Qty: 30 TABLET | Refills: 11 | Status: SHIPPED | OUTPATIENT
Start: 2024-11-08 | End: 2025-11-08

## 2024-11-08 RX ORDER — ATORVASTATIN CALCIUM 80 MG/1
80 TABLET, FILM COATED ORAL DAILY
Qty: 30 TABLET | Refills: 11 | Status: SHIPPED | OUTPATIENT
Start: 2024-11-08 | End: 2024-11-08 | Stop reason: SDUPTHER

## 2024-11-08 NOTE — TELEPHONE ENCOUNTER
Per Dr. Francis's note, patient is to continue Lipitor unless he develops muscle cramps.  Called patient twice to discuss.  Call disconnected twice.  Sending [employer] message.

## 2024-11-08 NOTE — TELEPHONE ENCOUNTER
Caller: Roby Ott    Relationship: Self    Best call back number: [phone]     Requested Prescriptions:   Requested Prescriptions     Pending Prescriptions Disp Refills    atorvastatin (Lipitor) 80 MG tablet 30 tablet 11     Sig: Take 1 tablet by mouth Daily.        Pharmacy where request [facility] be sent: [id number] - [facility] [address] [address] [place] [address] - [phone] [facility] [phone] FX     Last office visit with prescribing clinician: 9/24/2024   Last telemedicine visit with prescribing clinician: Visit date not found   Next office visit with prescribing clinician: 1/31/2025     Additional details provided by patient: PATIENT WANTS TO KNOW IF HE STILL NEEDS TO BE ON THIS MEDICATION.  IF HE DOES PLEASE SEND IN NEW PRESCRIPTION DUE TO THE REFILLS RAN OUT.  PLEASE SEND NEW PRESCRIPTION IF TAMI MARTIN WANTS HIM ON THIS MEDICATION.  HE HAS BEEN OUT FOR 10 DAYS OR IF NOT LONGER.       PLEASE CALL PATIENT IF SENT PRESCRIPTION TO THE PHARMACY AND CAN LEAVE VM MESSAGE.      Does the patient have less than a 3 day supply:  [x] Yes  [] No    Would you like a call back once the refill request has been completed: [] Yes [] No    If the office needs to give you a call back, can they leave a voicemail: [] Yes [] No    Ronny Boston Rep   11/08/24 14:12 EST

## 2024-11-21 ENCOUNTER — PATIENT OUTREACH (OUTPATIENT)
Dept: OTHER | Facility: HOSPITAL | Age: 69
End: 2024-11-21
Payer: MEDICARE

## 2024-11-21 NOTE — PROGRESS NOTES
Reviewed chart. Completed radiation 10/11. CT Abd/pelvis 12/6/24 (ordered by GI), CT chest 1/3/25, sees Dr. Beck 1/9/25    Called patient. He states he is doing well post radiation.  The patient states he is eating well and denies shortness of breath.    We discussed upcoming appts.    The patient denies any questions/concerns or ongoing resource needs. I will continue to follow; encouraged patient to call as needed.

## 2024-12-06 ENCOUNTER — HOSPITAL ENCOUNTER (OUTPATIENT)
Dept: PET IMAGING | Facility: HOSPITAL | Age: 69
Discharge: HOME | End: 2024-12-06
Payer: MEDICARE

## 2024-12-06 DIAGNOSIS — K59.00 CONSTIPATION, UNSPECIFIED CONSTIPATION TYPE: ICD-10-CM

## 2024-12-06 DIAGNOSIS — R10.9 RIGHT SIDED ABDOMINAL PAIN: ICD-10-CM

## 2024-12-06 DIAGNOSIS — K62.5 RECTAL BLEEDING: ICD-10-CM

## 2024-12-06 PROCEDURE — 25510000002 DIATRIZOATE MEGLUMINE & SODIUM PER 1 ML: Performed by: INTERNAL MEDICINE

## 2024-12-06 PROCEDURE — 74177 CT ABD & PELVIS W/CONTRAST: CPT

## 2024-12-06 PROCEDURE — 25510000001 IOPAMIDOL 61 % SOLUTION: Performed by: INTERNAL MEDICINE

## 2024-12-06 RX ORDER — IOPAMIDOL 612 MG/ML
100 INJECTION, SOLUTION INTRAVASCULAR
Status: COMPLETED | OUTPATIENT
Start: 2024-12-06 | End: 2024-12-06

## 2024-12-06 RX ORDER — DIATRIZOATE MEGLUMINE AND DIATRIZOATE SODIUM 660; 100 MG/ML; MG/ML
30 SOLUTION ORAL; RECTAL
Status: COMPLETED | OUTPATIENT
Start: 2024-12-06 | End: 2024-12-06

## 2024-12-06 RX ADMIN — IOPAMIDOL 85 ML: 612 INJECTION, SOLUTION INTRAVENOUS at 12:50

## 2024-12-06 RX ADMIN — DIATRIZOATE MEGLUMINE AND DIATRIZOATE SODIUM 30 ML: 660; 100 LIQUID ORAL; RECTAL at 12:50

## 2024-12-20 ENCOUNTER — TELEPHONE (OUTPATIENT)
Dept: GASTROENTEROLOGY | Facility: CLINIC | Age: 69
End: 2024-12-20
Payer: MEDICARE

## 2024-12-20 DIAGNOSIS — K62.5 RECTAL BLEEDING: ICD-10-CM

## 2024-12-20 DIAGNOSIS — R93.3 ABNORMAL COLONOSCOPY: Primary | ICD-10-CM

## 2024-12-20 DIAGNOSIS — Z85.118 HISTORY OF LUNG CANCER: ICD-10-CM

## 2024-12-20 NOTE — TELEPHONE ENCOUNTER
----- Message from Timothy Spencer sent at 12/19/2024  2:46 PM EST -----  Mr Ott's CT scan continues to show wall thickening and surrounding inflammation in the sigmoid colon, which was difficult are to get through on his colonoscopy.   I suspect this is from chronic diverticulitis.  Can we please arrange for referral to Dr Willis's office for evaluation.       Office f/u with BG as scheduled

## 2024-12-20 NOTE — TELEPHONE ENCOUNTER
Patient called. Advised as per Dr. Spencer's note. He verb understanding and is agreeable to the plan.   Referral to Dr. Willis sent via [employer].

## 2025-01-07 ENCOUNTER — PATIENT OUTREACH (OUTPATIENT)
Dept: OTHER | Facility: HOSPITAL | Age: 70
End: 2025-01-07
Payer: MEDICARE

## 2025-01-07 NOTE — PROGRESS NOTES
Reviewed chart. CT scheduled 1/28, sees Dr. Beck 2/6    Called patient. Left message that I was just touching base to see how he was doing. Wanted to know if he had any questions/concerns or resource/supportive care needs; requested cb

## 2025-01-16 ENCOUNTER — OFFICE VISIT (OUTPATIENT)
Dept: SURGERY | Facility: CLINIC | Age: 70
End: 2025-01-16
Payer: MEDICARE

## 2025-01-16 VITALS
HEIGHT: 72 IN | WEIGHT: 193.2 LBS | DIASTOLIC BLOOD PRESSURE: 84 MMHG | SYSTOLIC BLOOD PRESSURE: 126 MMHG | BODY MASS INDEX: 26.17 KG/M2 | OXYGEN SATURATION: 97 % | HEART RATE: 87 BPM

## 2025-01-16 DIAGNOSIS — K57.90 DIVERTICULOSIS: Primary | ICD-10-CM

## 2025-01-16 NOTE — PROGRESS NOTES
Colorectal & General Surgery  Consultation    Patient: Roby Ott  YOB: 1955  MRN: [id number]      Assessment  Roby Ott is a 69 y.o. male who presents in consultation regarding lower abdominal pain and occasional hematochezia.  He recently underwent a colonoscopy which demonstrated some edematous appearing mucosa within his sigmoid colon.  Biopsy consistent with hyperplastic polyp.  CT scan of the abdomen pelvis demonstrates some diverticular disease as well as what appears to be some narrowing of his proximal sigmoid colon and distal descending colon.  He does not really describe any significant obstructive symptoms today and is not currently having any hematochezia.  I suspect that he may have some chronic diverticular disease that could cause some mild intermittent obstruction.  His history is a little difficult to understand and obtain, which I think limits evaluation.  Regardless, I do not see any indication for any kind of urgent surgical intervention.  I instructed him to take psyllium fiber powder twice daily and see if that helps any of his symptoms.  He will call me if he has continued pain.  I think at that point, he may benefit from a Gastrografin enema study to evaluate the patency of his colon and to see if there is any areas of any significant stricture.    Referring Provider: Timothy Spencer MD     Reason for Consultation: Abdominal pain, rectal bleeding    History of Present Illness   Roby Ott is a 69 y.o. male who presents the office in consultation regarding abdominal pain and rectal bleeding today.  He reports some mild lower abdominal pain at times.  It comes and goes and is not particularly associated with any count of oral intake.  He says that he has good bowel movements and has not noticed any hematochezia lately.  He is very focused on treatment of his lung cancer currently.    Most recent colonoscopy: 2024    Past Medical History   Past Medical  History:   Diagnosis Date    Cancer     Lung    Colon polyp     COPD (chronic obstructive pulmonary disease)     Hyperlipidemia     Osteoid osteoma     right parietal    Right lower lobe lung mass 2024    Stroke 2024    TIA (transient ischemic attack)         Past Surgical History   Past Surgical History:   Procedure Laterality Date    BRONCHOSCOPY N/A 2024    Procedure: BRONCHOSCOPY WITH ENDOBRONCHIAL ULTRASOUND;  Surgeon: Terrence Mock MD;  Location: [facility] ENDOSCOPY;  Service: Pulmonary;  Laterality: N/A;  PRE- LUNG CANCER  POST- SAME    COLONOSCOPY      COLONOSCOPY N/A 2024    Procedure: COLONOSCOPY;  Surgeon: Timothy Spencer MD;  Location: [facility] ENDOSCOPY;  Service: Gastroenterology;  Laterality: N/A;  Pre:abnormal PET scan of colon  Post: diverticulosis, diverticulitis, colon polyp    SKIN SURGERY      removal of ingrown hairs on face & warts       Social History  Social History     Socioeconomic History    Marital status:    Tobacco Use    Smoking status: Former     Current packs/day: 0.00     Average packs/day: 2.0 packs/day for 54.5 years (109.1 ttl pk-yrs)     Types: Cigarettes     Start date: 1970     Quit date: 2024     Years since quittin.4     Passive exposure: Past    Smokeless tobacco: Never    Tobacco comments:     Smoked 2 packs daily  > 50 years   Vaping Use    Vaping status: Never Used   Substance and Sexual Activity    Alcohol use: Not Currently     Comment: None    Drug use: Never     Types: Marijuana    Sexual activity: Not Currently     Partners: Female     Birth control/protection: None       Family History  Family History   Problem Relation Age of Onset    Dementia Mother     Cancer Father         Cancer    Cancer Brother         Cancer    Malig Hyperthermia Neg Hx      Review of Systems  Negative except as documented in the HPI.     Allergies  No Known Allergies    Medications    Current Outpatient Medications:     apixaban (ELIQUIS) 5  MG tablet tablet, Take 1 tablet by mouth 2 (Two) Times a Day., Disp: 60 tablet, Rfl: 3    atorvastatin (Lipitor) 80 MG tablet, Take 1 tablet by mouth Daily., Disp: 30 tablet, Rfl: 11    Vital Signs  Vitals:    01/16/25 0838   BP: 126/84   Pulse: 87   SpO2: 97%        Physical Exam  Constitutional: Resting comfortably, no acute distress  Neck: Supple, trachea midline  Respiratory: No increased work of breathing, Symmetric excursion  Cardiovascular: Well pefursed, no jugular venous distention evident   Abdominal: Soft, non-tender, non-distended  Lymphatics: No cervical or suprascapular adenopathy  Skin: Warm, dry, no rash on visualized skin surfaces  Musculoskeletal: Symmetric strength, no obvious gross abnormalities  Psychiatric: Alert and oriented ×3, normal affect          Laboratory Results  I have personally reviewed CBC with Ocie 7, Humoryl 13, placed 215.  BMP with creatinine 0.91, albumin 4.0    Radiology  I have personally reviewed CT scan the abdomen pelvis demonstrates diverticular disease within the sigmoid colon and some area of collapsed distal descending colon and proximal sigmoid colon.  No pneumoperitoneum.    Endoscopy  I have personally reviewed Colonoscopy report from Dr. Gordon from September 17, 2024 which demonstrated edematous and thickened mucosa in the distal sigmoid colon that was negotiated with a gastroscope as well as diverticulosis in the sigmoid colon and the descending colon         Dain Willis MD  Colorectal & General Surgery  [facility]    [address], [address]  [place], [place], [address]  P: [phone]  F: [phone]

## 2025-01-28 ENCOUNTER — HOSPITAL ENCOUNTER (OUTPATIENT)
Dept: CT IMAGING | Facility: HOSPITAL | Age: 70
Discharge: HOME | End: 2025-01-28
Admitting: RADIOLOGY
Payer: MEDICARE

## 2025-01-28 DIAGNOSIS — C34.31 CANCER OF BRONCHUS OF RIGHT LOWER LOBE: ICD-10-CM

## 2025-01-28 PROCEDURE — 71250 CT THORAX DX C-: CPT

## 2025-01-31 ENCOUNTER — OFFICE VISIT (OUTPATIENT)
Dept: FAMILY MEDICINE CLINIC | Facility: CLINIC | Age: 70
End: 2025-01-31
Payer: MEDICARE

## 2025-01-31 VITALS
TEMPERATURE: 98 F | HEART RATE: 94 BPM | DIASTOLIC BLOOD PRESSURE: 82 MMHG | OXYGEN SATURATION: 96 % | BODY MASS INDEX: 26.01 KG/M2 | HEIGHT: 72 IN | SYSTOLIC BLOOD PRESSURE: 146 MMHG | WEIGHT: 192 LBS

## 2025-01-31 DIAGNOSIS — Z00.00 MEDICARE ANNUAL WELLNESS VISIT, SUBSEQUENT: Primary | ICD-10-CM

## 2025-01-31 PROCEDURE — 1159F MED LIST DOCD IN RCRD: CPT | Performed by: NURSE PRACTITIONER

## 2025-01-31 PROCEDURE — G0439 PPPS, SUBSEQ VISIT: HCPCS | Performed by: NURSE PRACTITIONER

## 2025-01-31 PROCEDURE — 1160F RVW MEDS BY RX/DR IN RCRD: CPT | Performed by: NURSE PRACTITIONER

## 2025-01-31 PROCEDURE — 1126F AMNT PAIN NOTED NONE PRSNT: CPT | Performed by: NURSE PRACTITIONER

## 2025-01-31 NOTE — PROGRESS NOTES
Subjective   The ABCs of the Annual Wellness Visit  Medicare Wellness Visit      Roby Ott is a 69 y.o. patient who presents for a Medicare Wellness Visit.    The following portions of the patient's history were reviewed and   updated as appropriate: allergies, current medications, past family history, past medical history, past social history, past surgical history, and problem list.    Compared to one year ago, the patient's physical   health is better.  Compared to one year ago, the patient's mental   health is the same.    Recent Hospitalizations:  This patient has had a [facility] admission record on file within the last 365 days.  Current Medical Providers:  Patient Care Team:  Berta Hathaway APRN as PCP - General (Family Medicine)  Betty Barros, RN as Nurse Navigator  Timothy Spencer MD as Consulting Physician (Gastroenterology)  Alexandr Davis MD as Surgeon (Neurosurgery)  Sea Beck MD as Consulting Physician (Radiation Oncology)    Outpatient Medications Prior to Visit   Medication Sig Dispense Refill    apixaban (ELIQUIS) 5 MG tablet tablet Take 1 tablet by mouth 2 (Two) Times a Day. 60 tablet 3    atorvastatin (Lipitor) 80 MG tablet Take 1 tablet by mouth Daily. 30 tablet 11    psyllium (METAMUCIL) 58.6 % packet Take 1 packet by mouth Daily.       No facility-administered medications prior to visit.     No opioid medication identified on active medication list. I have reviewed chart for other potential  high risk medication/s and harmful drug interactions in the elderly.      Aspirin is not on active medication list.  Aspirin use is contraindicated for this patient due to: current use of Eliquis.  .    Patient Active Problem List   Diagnosis    Right lower lobe lung mass    Borderline type 2 diabetes mellitus    Tobacco abuse    Malignant neoplasm of lower lobe of right lung    History of stroke    Cerebrovascular accident (CVA) due to bilateral embolism of middle cerebral  "arteries    Paroxysmal atrial fibrillation    Abnormal PET scan of colon    Osteoid osteoma    Cancer of bronchus of right lower lobe     Advance Care Planning Advance Directive is not on file.  ACP discussion was held with the patient during this visit. Patient does not have an advance directive, declines further assistance.            Objective   Vitals:    25 1145   BP: 146/82   BP Location: Right arm   Patient Position: Sitting   Cuff Size: Adult   Pulse: 94   Temp: 98 °F (36.7 °C)   TempSrc: Temporal   SpO2: 96%   Weight: 87.1 kg (192 lb)   Height: 182.9 cm (72.01\")   PainSc: 0-No pain       Estimated body mass index is 26.03 kg/m² as calculated from the following:    Height as of this encounter: 182.9 cm (72.01\").    Weight as of this encounter: 87.1 kg (192 lb).                Does the patient have evidence of cognitive impairment? No                                                                                               Health  Risk Assessment    Smoking Status:  Social History     Tobacco Use   Smoking Status Former    Current packs/day: 0.00    Average packs/day: 2.0 packs/day for 54.5 years (109.1 ttl pk-yrs)    Types: Cigarettes    Start date: 1970    Quit date: 2024    Years since quittin.5    Passive exposure: Past   Smokeless Tobacco Never   Tobacco Comments    Smoked 2 packs daily  > 50 years     Alcohol Consumption:  Social History     Substance and Sexual Activity   Alcohol Use Not Currently    Comment: None       Fall Risk Screen  STEADI Fall Risk Assessment was completed, and patient is at LOW risk for falls.Assessment completed on:2025    Depression Screening   Little interest or pleasure in doing things? Not at all   Feeling down, depressed, or hopeless? Not at all   PHQ-2 Total Score 0      Health Habits and Functional and Cognitive Screenin/24/2025     7:52 AM   Functional & Cognitive Status   Do you have difficulty preparing food and eating? No    Do " you have difficulty bathing yourself, getting dressed or grooming yourself? No    Do you have difficulty using the toilet? Yes    Do you have difficulty moving around from place to place? No    Do you have trouble with steps or getting out of a bed or a chair? No    Current Diet Well Balanced Diet    Dental Exam Not up to date    Eye Exam Not up to date    Exercise (times per week) 0 times per week    Current Exercises Include No Regular Exercise;Home Exercise Program (TV, Computer, Etc.);House Cleaning;Yard Work    Do you need help using the phone?  No    Are you deaf or do you have serious difficulty hearing?  No    Do you need help to go to places out of walking distance? No    Do you need help shopping? No    Do you need help preparing meals?  No    Do you need help with housework?  No    Do you need help with laundry? No    Do you need help taking your medications? No    Do you need help managing money? No    Do you ever drive or ride in a car without wearing a seat belt? No    Have you felt unusual stress, anger or loneliness in the last month? No    Who do you live with? Spouse    If you need help, do you have trouble finding someone available to you? No    Have you been bothered in the last four weeks by sexual problems? No    Do you have difficulty concentrating, remembering or making decisions? No        Patient-reported           Age-appropriate Screening Schedule:  Refer to the list below for future screening recommendations based on patient's age, sex and/or medical conditions. Orders for these recommended tests are listed in the plan section. The patient has been provided with a written plan.    Health Maintenance List  Health Maintenance   Topic Date Due    COVID-19 Vaccine (1) Never done    Pneumococcal Vaccine 65+ (1 of 2 - PCV) Never done    TDAP/TD VACCINES (1 - Tdap) Never done    ZOSTER VACCINE (1 of 2) Never done    INFLUENZA VACCINE  Never done    HEPATITIS C SCREENING  Never done    ANNUAL  WELLNESS VISIT  Never done    LIPID PANEL  07/19/2025    BMI FOLLOWUP  07/23/2025    COLORECTAL CANCER SCREENING  09/17/2034    AAA SCREEN ONCE  Completed    LUNG CANCER SCREENING  Discontinued                                                                                                                                                CMS Preventative Services Quick Reference  Risk Factors Identified During Encounter  None Identified    The above risks/problems have been discussed with the patient.  Pertinent information has been shared with the patient in the After Visit Summary.  An After Visit Summary and PPPS were made available to the patient.    Follow Up:   Next Medicare Wellness visit to be scheduled in 1 year.     Assessment & Plan  Medicare annual wellness visit, subsequent              Follow Up:   No follow-ups on file.

## 2025-02-05 ENCOUNTER — TELEPHONE (OUTPATIENT)
Dept: RADIATION ONCOLOGY | Facility: HOSPITAL | Age: 70
End: 2025-02-05
Payer: MEDICARE

## 2025-02-06 ENCOUNTER — LAB (OUTPATIENT)
Facility: HOSPITAL | Age: 70
End: 2025-02-06
Payer: MEDICARE

## 2025-02-06 ENCOUNTER — OFFICE VISIT (OUTPATIENT)
Dept: RADIATION ONCOLOGY | Facility: HOSPITAL | Age: 70
End: 2025-02-06
Payer: MEDICARE

## 2025-02-06 VITALS
SYSTOLIC BLOOD PRESSURE: 143 MMHG | WEIGHT: 192 LBS | HEART RATE: 90 BPM | OXYGEN SATURATION: 95 % | DIASTOLIC BLOOD PRESSURE: 77 MMHG | BODY MASS INDEX: 26.03 KG/M2

## 2025-02-06 DIAGNOSIS — C34.31 MALIGNANT NEOPLASM OF LOWER LOBE OF RIGHT LUNG: Primary | ICD-10-CM

## 2025-02-06 PROCEDURE — 83036 HEMOGLOBIN GLYCOSYLATED A1C: CPT | Performed by: NURSE PRACTITIONER

## 2025-02-06 PROCEDURE — 80061 LIPID PANEL: CPT | Performed by: NURSE PRACTITIONER

## 2025-02-06 PROCEDURE — G0463 HOSPITAL OUTPT CLINIC VISIT: HCPCS | Performed by: RADIOLOGY

## 2025-02-06 PROCEDURE — 80053 COMPREHEN METABOLIC PANEL: CPT | Performed by: NURSE PRACTITIONER

## 2025-02-06 NOTE — PROGRESS NOTES
Cancer Staging   CC: 3 month follow up for cT1N0 adenocarcinoma of the right lower lobe lung    S:    Roby Ott is a 69 y.o. male cT1N0 adenocarcinoma of the right lung. He had a pre-treatment PET/CT imaging performed 8/12/2024 demonstrating a 3.6 x 3.6 lobular mass.  MRI of the brain was normal.  CT-guided needle biopsy of the right lower lobe performed 7/22/2024 was consistent with adenocarcinoma.  Patient was not felt to be a candidate for surgical resection after evaluation with Dr. Mock.    He recently completed SBRT to the right lower lobe nodule on 10/11/2024 with Dr. Sea Beck.  He received a dose of 50Gy in 5 fractions.     He had a CT chest on 1/28/25 which showed the right lower lobe lung mass smaller 3 x 2.5cm with post radiation changes. The adjacent sub-4mm pulmonary nodule in the right lower lobe is less conspicuous.      He denies any soa out of normal or cough.         Review of Systems   Constitutional: Negative.    Respiratory: Negative.     Psychiatric/Behavioral: Negative.           Past Medical History:   Diagnosis Date    Cancer     Lung    Colon polyp     COPD (chronic obstructive pulmonary disease)     Hyperlipidemia     Osteoid osteoma     right parietal    Right lower lobe lung mass 07/20/2024    Stroke 07/17/2024    TIA (transient ischemic attack)          Past Surgical History:   Procedure Laterality Date    BRONCHOSCOPY N/A 09/06/2024    Procedure: BRONCHOSCOPY WITH ENDOBRONCHIAL ULTRASOUND;  Surgeon: Terrence Mock MD;  Location: [facility] ENDOSCOPY;  Service: Pulmonary;  Laterality: N/A;  PRE- LUNG CANCER  POST- SAME    COLONOSCOPY      COLONOSCOPY N/A 9/17/2024    Procedure: COLONOSCOPY;  Surgeon: Timothy Spencer MD;  Location: [facility] ENDOSCOPY;  Service: Gastroenterology;  Laterality: N/A;  Pre:abnormal PET scan of colon  Post: diverticulosis, diverticulitis, colon polyp    SKIN SURGERY  1994    removal of ingrown hairs on face & warts         Social History      Socioeconomic History    Marital status:    Tobacco Use    Smoking status: Former     Current packs/day: 0.00     Average packs/day: 2.0 packs/day for 54.5 years (109.1 ttl pk-yrs)     Types: Cigarettes     Start date: 1970     Quit date: 2024     Years since quittin.5     Passive exposure: Past    Smokeless tobacco: Never    Tobacco comments:     Smoked 2 packs daily  > 50 years   Vaping Use    Vaping status: Never Used   Substance and Sexual Activity    Alcohol use: Not Currently     Comment: None    Drug use: Never     Types: Marijuana    Sexual activity: Not Currently     Partners: Female     Birth control/protection: None         Family History   Problem Relation Age of Onset    Dementia Mother     Cancer Father         Cancer    Cancer Brother         Cancer    Malig Hyperthermia Neg Hx           Objective    Physical Exam  Constitutional:       Appearance: Normal appearance.   Pulmonary:      Effort: Pulmonary effort is normal. No respiratory distress.      Breath sounds: Normal breath sounds. No wheezing.   Neurological:      Mental Status: He is alert.   Psychiatric:         Mood and Affect: Mood normal.         Behavior: Behavior normal.         Thought Content: Thought content normal.           Current Outpatient Medications on File Prior to Visit   Medication Sig Dispense Refill    apixaban (ELIQUIS) 5 MG tablet tablet Take 1 tablet by mouth 2 (Two) Times a Day. 60 tablet 3    atorvastatin (Lipitor) 80 MG tablet Take 1 tablet by mouth Daily. 30 tablet 11    psyllium (METAMUCIL) 58.6 % packet Take 1 packet by mouth Daily.       No current facility-administered medications on file prior to visit.       ALLERGIES:  No Known Allergies    /77   Pulse 90   Wt 87.1 kg (192 lb)   SpO2 95%   BMI 26.03 kg/m²           No data to display                  Assessment & Plan    69 y.o. male cT1N0 adenocarcinoma of the right lung now 3 months out from stereotactic radiation therapy.  I  have personally reviewed his most recent CT chest which shows a decrease in size of the treated lesion.  I have also reviewed his treatment plan.  The patient is new to me today as I am assuming care from Dr. Sea Beck.  I have scheduled a repeat CT chest in 4 months and a follow up with me one week later.    I personally spent greater than 35 minutes today assessing, managing, discussing and documenting my visit with the patient. That time includes review of records, imaging and pathology reports, obtaining my own history, performing a medically appropriate evaluation, counseling and educating the patient, discussing goals, logistics, alternatives and risks of my recommendations, surveillance options and potential outcomes. It also includes the time documenting the clinical information in the EMR and communicating my recommendations to the other involved physicians.       Thank you very much for allowing me to participate in the care of this very pleasant patient.    Sincerely,      Dennise Finn MD

## 2025-02-07 ENCOUNTER — PATIENT OUTREACH (OUTPATIENT)
Dept: OTHER | Facility: HOSPITAL | Age: 70
End: 2025-02-07
Payer: MEDICARE

## 2025-02-07 NOTE — PROGRESS NOTES
Reviewed chart. Patient with cT1N0 adenocarcinoma of the right lung.S/p SBRT to the right lower lobe nodule 50Gy in 5 fractions completed on 10/11/2024 with Dr. Sea Beck.  Saw Dr. Finn yesterday. Plan CT scan in 4 mths    Called patient. Left message that I was just touching base to see how he was doing. Wanted to know if he had any questions/concerns following his recent CT scan and appt with Dr. Finn or resource/supportive care needs; requested cb

## 2025-02-20 ENCOUNTER — PATIENT OUTREACH (OUTPATIENT)
Dept: OTHER | Facility: HOSPITAL | Age: 70
End: 2025-02-20
Payer: MEDICARE

## 2025-02-20 NOTE — PROGRESS NOTES
Reviewed chart. Patient with cT1N0 adenocarcinoma of the right lung, s/p 5 SBRT under the care of Dr. Beck completed 10/11/24.  Patient met with Dr. Finn 2/6. 1/28/25 CT chest showed the right lower lobe lung mass smaller 3 x 2.5cm with post radiation changes. F/u CT chest 6/6 and appt with Dr. Finn 6/16/2025    Called patient.  He states he is doing well.  We discussed his recent appt with Dr. Finn.  We discussed his f/u appts in June.    The patient denies any questions/concerns or ongoing resource needs. Navigation will continue to follow; encouraged patient to call as needed.

## 2025-03-07 DIAGNOSIS — I48.91 ATRIAL FIBRILLATION, UNSPECIFIED TYPE: ICD-10-CM

## 2025-03-07 NOTE — TELEPHONE ENCOUNTER
Caller: Roby Ott    Relationship: Self    Best call back number: [phone]    Requested Prescriptions:   Requested Prescriptions     Pending Prescriptions Disp Refills    apixaban (ELIQUIS) 5 MG tablet tablet 60 tablet 3     Sig: Take 1 tablet by mouth 2 (Two) Times a Day.        Pharmacy where request should be sent: [facility] [id number] - [address] - [phone] PH - [phone] FX     Last office visit with prescribing clinician: 8/14/2024   Last telemedicine visit with prescribing clinician: Visit date not found   Next office visit with prescribing clinician: Visit date not found     Additional details provided by patient: HAS 3 DAYS LEFT    Does the patient have less than a 3 day supply:  [] Yes  [x] No    Would you like a call back once the refill request has been completed: [] Yes [x] No    If the office needs to give you a call back, can they leave a voicemail: [] Yes [x] No    Ronny Nicole Rep   03/07/25 09:42 EST

## 2025-05-20 ENCOUNTER — OFFICE VISIT (OUTPATIENT)
Dept: FAMILY MEDICINE CLINIC | Facility: CLINIC | Age: 70
End: 2025-05-20
Payer: MEDICARE

## 2025-05-20 VITALS
BODY MASS INDEX: 26.66 KG/M2 | TEMPERATURE: 97.3 F | HEART RATE: 67 BPM | DIASTOLIC BLOOD PRESSURE: 76 MMHG | HEIGHT: 72 IN | WEIGHT: 196.8 LBS | OXYGEN SATURATION: 95 % | SYSTOLIC BLOOD PRESSURE: 144 MMHG

## 2025-05-20 DIAGNOSIS — I63.9 CEREBROVASCULAR ACCIDENT (CVA), UNSPECIFIED MECHANISM: ICD-10-CM

## 2025-05-20 DIAGNOSIS — R73.03 BORDERLINE TYPE 2 DIABETES MELLITUS: ICD-10-CM

## 2025-05-20 DIAGNOSIS — C34.31 MALIGNANT NEOPLASM OF LOWER LOBE OF RIGHT LUNG: Primary | ICD-10-CM

## 2025-05-20 DIAGNOSIS — I48.0 PAROXYSMAL ATRIAL FIBRILLATION: ICD-10-CM

## 2025-05-20 PROCEDURE — 1126F AMNT PAIN NOTED NONE PRSNT: CPT | Performed by: NURSE PRACTITIONER

## 2025-05-20 PROCEDURE — 1160F RVW MEDS BY RX/DR IN RCRD: CPT | Performed by: NURSE PRACTITIONER

## 2025-05-20 PROCEDURE — 1159F MED LIST DOCD IN RCRD: CPT | Performed by: NURSE PRACTITIONER

## 2025-05-20 PROCEDURE — 99212 OFFICE O/P EST SF 10 MIN: CPT | Performed by: NURSE PRACTITIONER

## 2025-05-20 NOTE — PROGRESS NOTES
"Chief Complaint  No chief complaint on file.    Subjective        Roby Ott presents to [facility]  History of Present Illness  Here to get papers signed for varification of chronic illness. Chronic illnesses include A-fib, CVA, pre-diabetes and cancer of the right lower lobe of his lungs. This paper work is for his insurance company to get assistance with his medications.       I have reviewed the patient's medical history in detail and updated the computerized patient record.       Current Outpatient Medications:     apixaban (ELIQUIS) 5 MG tablet tablet, Take 1 tablet by mouth 2 (Two) Times a Day., Disp: 60 tablet, Rfl: 3    atorvastatin (Lipitor) 80 MG tablet, Take 1 tablet by mouth Daily., Disp: 30 tablet, Rfl: 11    psyllium (METAMUCIL) 58.6 % packet, Take 1 packet by mouth Daily., Disp: 54 each, Rfl: 2       Objective   Vital Signs:  /76 (BP Location: Right arm, Patient Position: Sitting, Cuff Size: Adult)   Pulse 67   Temp 97.3 °F (36.3 °C) (Temporal)   Ht 182.9 cm (72.01\")   Wt 89.3 kg (196 lb 12.8 oz)   SpO2 95%   BMI 26.68 kg/m²   Estimated body mass index is 26.68 kg/m² as calculated from the following:    Height as of this encounter: 182.9 cm (72.01\").    Weight as of this encounter: 89.3 kg (196 lb 12.8 oz).      Physical Exam  Vitals reviewed.   Constitutional:       Appearance: Normal appearance. He is normal weight.   Neurological:      Mental Status: He is alert and oriented to person, place, and time.   Psychiatric:      Comments: No acute distress        Result Review :                Assessment and Plan   Diagnoses and all orders for this visit:    1. Malignant neoplasm of lower lobe of right lung (Primary)    2. Cerebrovascular accident (CVA), unspecified mechanism    3. Paroxysmal atrial fibrillation    4. Borderline type 2 diabetes mellitus    Mr. Ott appears to be doing well.   I have reviewed his chronic illnesses with him and I have " verified/filled out the necessary paper work for him today. Copies have been scanned into his medical record.          Follow Up   Return if symptoms worsen or fail to improve, for Next scheduled follow up.  Patient was given instructions and counseling regarding his condition or for health maintenance advice. Please see specific information pulled into the AVS if appropriate.

## 2025-06-06 ENCOUNTER — HOSPITAL ENCOUNTER (OUTPATIENT)
Dept: PET IMAGING | Facility: HOSPITAL | Age: 70
Discharge: HOME | End: 2025-06-06
Payer: MEDICARE

## 2025-06-06 DIAGNOSIS — C34.31 MALIGNANT NEOPLASM OF LOWER LOBE OF RIGHT LUNG: ICD-10-CM

## 2025-06-06 PROCEDURE — 71250 CT THORAX DX C-: CPT

## 2025-06-20 ENCOUNTER — TELEPHONE (OUTPATIENT)
Dept: RADIATION ONCOLOGY | Facility: HOSPITAL | Age: 70
End: 2025-06-20
Payer: MEDICARE

## 2025-06-23 ENCOUNTER — PATIENT OUTREACH (OUTPATIENT)
Dept: OTHER | Facility: HOSPITAL | Age: 70
End: 2025-06-23
Payer: MEDICARE

## 2025-06-23 ENCOUNTER — OFFICE VISIT (OUTPATIENT)
Dept: RADIATION ONCOLOGY | Facility: HOSPITAL | Age: 70
End: 2025-06-23
Payer: MEDICARE

## 2025-06-23 VITALS
WEIGHT: 198 LBS | BODY MASS INDEX: 26.85 KG/M2 | HEART RATE: 94 BPM | RESPIRATION RATE: 18 BRPM | SYSTOLIC BLOOD PRESSURE: 144 MMHG | DIASTOLIC BLOOD PRESSURE: 74 MMHG | OXYGEN SATURATION: 94 %

## 2025-06-23 DIAGNOSIS — C34.31 MALIGNANT NEOPLASM OF LOWER LOBE OF RIGHT LUNG: ICD-10-CM

## 2025-06-23 DIAGNOSIS — C34.31 CANCER OF BRONCHUS OF RIGHT LOWER LOBE: Primary | ICD-10-CM

## 2025-06-23 PROCEDURE — 99214 OFFICE O/P EST MOD 30 MIN: CPT | Performed by: RADIOLOGY

## 2025-06-23 PROCEDURE — G2211 COMPLEX E/M VISIT ADD ON: HCPCS | Performed by: RADIOLOGY

## 2025-06-23 PROCEDURE — 1126F AMNT PAIN NOTED NONE PRSNT: CPT | Performed by: RADIOLOGY

## 2025-06-23 PROCEDURE — G0463 HOSPITAL OUTPT CLINIC VISIT: HCPCS | Performed by: RADIOLOGY

## 2025-06-23 NOTE — PROGRESS NOTES
Cancer Staging     CC: 8 month follow up for cT1N0 adenocarcinoma of the right lower lobe lung     S:     Roby Ott is a 69 y.o. male cT1N0 adenocarcinoma of the right lung. He had a pre-treatment PET/CT imaging performed 8/12/2024 demonstrating a 3.6 x 3.6 lobular mass right lung.  MRI of the brain was normal.  CT-guided needle biopsy of the right lower lobe performed 7/22/2024 was consistent with adenocarcinoma.      He recently completed SBRT to the right lower lobe nodule on 10/11/2024 with Dr. Sea Beck.  He received a dose of 50Gy in 5 fractions.      He had a CT chest on 1/28/25 which showed the right lower lobe lung mass smaller 3 x 2.5cm with post radiation changes. The adjacent sub-4mm pulmonary nodule in the right lower lobe is less conspicuous.      He returns today for follow up now 8 months out from SBRT.  He had a CT chest on 6/6/25 which showed an opacity in the basilar right lower lobe, with volume loss anddistortion, suspect a radiated mass with increase in extent of theopacity which is most likely secondary to progressive radiation changes.  Stable pulmonary nodules.      He denies any soa out of normal or cough.      Review of Systems   Constitutional: Negative.    Respiratory: Negative.     Musculoskeletal:  Positive for arthralgias.   Psychiatric/Behavioral: Negative.         Past Medical History:   Diagnosis Date    Cancer     Lung    Colon polyp     COPD (chronic obstructive pulmonary disease)     Hyperlipidemia     Osteoid osteoma     right parietal    Right lower lobe lung mass 07/20/2024    Stroke 07/17/2024    TIA (transient ischemic attack)          Past Surgical History:   Procedure Laterality Date    BRONCHOSCOPY N/A 09/06/2024    Procedure: BRONCHOSCOPY WITH ENDOBRONCHIAL ULTRASOUND;  Surgeon: Terrence Mock MD;  Location: [facility] ENDOSCOPY;  Service: Pulmonary;  Laterality: N/A;  PRE- LUNG CANCER  POST- SAME    COLONOSCOPY      COLONOSCOPY N/A 9/17/2024    Procedure:  COLONOSCOPY;  Surgeon: Timothy Spencer MD;  Location: [facility] ENDOSCOPY;  Service: Gastroenterology;  Laterality: N/A;  Pre:abnormal PET scan of colon  Post: diverticulosis, diverticulitis, colon polyp    SKIN SURGERY      removal of ingrown hairs on face & warts         Social History     Socioeconomic History    Marital status:    Tobacco Use    Smoking status: Former     Current packs/day: 0.00     Average packs/day: 2.0 packs/day for 54.5 years (109.1 ttl pk-yrs)     Types: Cigarettes     Start date: 1970     Quit date: 2024     Years since quittin.9     Passive exposure: Past    Smokeless tobacco: Never    Tobacco comments:     Smoked 2 packs daily  > 50 years   Vaping Use    Vaping status: Never Used   Substance and Sexual Activity    Alcohol use: Not Currently     Comment: None    Drug use: Never     Types: Marijuana    Sexual activity: Not Currently     Partners: Female     Birth control/protection: None         Family History   Problem Relation Age of Onset    Dementia Mother     Cancer Father         Cancer    Cancer Brother         Cancer    Malig Hyperthermia Neg Hx           Objective    Physical Exam  Constitutional:       Appearance: Normal appearance.   Pulmonary:      Effort: Pulmonary effort is normal. No respiratory distress.      Breath sounds: Normal breath sounds. No stridor. No wheezing.   Neurological:      Mental Status: He is alert.   Psychiatric:         Mood and Affect: Mood normal.         Current Outpatient Medications on File Prior to Visit   Medication Sig Dispense Refill    apixaban (ELIQUIS) 5 MG tablet tablet Take 1 tablet by mouth 2 (Two) Times a Day. 60 tablet 3    atorvastatin (Lipitor) 80 MG tablet Take 1 tablet by mouth Daily. 30 tablet 11    psyllium (METAMUCIL) 58.6 % packet Take 1 packet by mouth Daily. 54 each 2     No current facility-administered medications on file prior to visit.       ALLERGIES:  No Known Allergies    There were no vitals  taken for this visit.          No data to display                  Assessment & Plan   69 y.o. male cT1N0 adenocarcinoma of the right lung now 8 months out from stereotactic radiation therapy.  I have personally reviewed his most recent CT chest which shows a decrease in size of the treated lesion and continued post radiation changes.  I have scheduled a repeat CT chest in 6 months and a follow up with me one week later.  I asked him to call with any questions or concerns in the interim.    I personally spent greater than 35 minutes today assessing, managing, discussing and documenting my visit with the patient. That time includes review of records, imaging and pathology reports, obtaining my own history, performing a medically appropriate evaluation, counseling and educating the patient, discussing goals, logistics, alternatives and risks of my recommendations, surveillance options and potential outcomes. It also includes the time documenting the clinical information in the EMR and communicating my recommendations to the other involved physicians.                Thank you very much for allowing me to participate in the care of this very pleasant patient.    Sincerely,      Dennise Finn MD

## 2025-06-23 NOTE — PROGRESS NOTES
Reviewed chart.  Patient with cT1N0 adenocarcinoma of the right lung.S/p SBRT to the right lower lobe nodule 50Gy in 5 fractions completed on 10/11/2024     Met patient in radiation following his appt with Dr. Finn. The patient states he is doing fairly well with no new complaints.  He will have a CT chest in 6 months and meet with Dr. Finn after.    The patient denies any disease or treatment related questions or concerns.    The patient states will be filing for  FA for this year. Offered to connect him with our financial navigator, Timothy, which he declined.   He is also trying to get additional SSI. He voiced money being tight and having some difficulty paying for household bills although is not interested in connecting with our social work team to discuss other possible resource or non-medical grants.  Encouraged him to call in the future if he changes his mind.     Since he is stable with no active cancer treatment, I will close his case to navigation although reminded him that he can access the services in the Cancer Center even with his navigation case being closed.  Survivorship information reviewed with patient. He just had his colonoscopy 9/2024. He declined the [facility] Survivorship booklet. Encouraged patient to call in the future if the need arises. Patient verbalized understanding.

## 2025-07-07 DIAGNOSIS — I48.91 ATRIAL FIBRILLATION, UNSPECIFIED TYPE: ICD-10-CM

## 2025-07-07 NOTE — TELEPHONE ENCOUNTER
Caller: Roby Ott    Relationship: Self    Best call back number: [phone]     Requested Prescriptions:   Requested Prescriptions     Pending Prescriptions Disp Refills    apixaban (ELIQUIS) 5 MG tablet tablet 60 tablet 3     Sig: Take 1 tablet by mouth 2 (Two) Times a Day.        Pharmacy where request should be sent: [facility] [id number] - [address] - [phone]  - [phone] FX     Last office visit with prescribing clinician: 8/14/2024   Last telemedicine visit with prescribing clinician: Visit date not found   Next office visit with prescribing clinician: Visit date not found     Additional details provided by patient: SEND A MYCHART ONCE COMPLETED; PATIENT IS REQUESTING A CALL BACK TO DISCUSS WHY HE IS NOT ABLE TO PUT A PRESCRIPTION REFILL IN ON MYCHART; ADVISED THAT I COULD TRANSFER HIM TO MYCHART HELP DESK AND PATIENT STATED THAT THE PROVIDER HAS TO BE THE ONE TO APPROVE HIM DOING IT THAT WAY    Does the patient have less than a 3 day supply:  [x] Yes  [] No    Would you like a call back once the refill request has been completed: [] Yes [x] No    If the office needs to give you a call back, can they leave a voicemail: [] Yes [x] No    Ronny Garcia   07/07/25 14:54 EDT

## (undated) DEVICE — CANN O2 ETCO2 FITS ALL CONN CO2 SMPL A/ 7IN DISP LF

## (undated) DEVICE — MSK AIRWY LARYNG LMA PILOT SZ4

## (undated) DEVICE — SENSR O2 OXIMAX FNGR A/ 18IN NONSTR

## (undated) DEVICE — SINGLE-USE BIOPSY FORCEPS: Brand: RADIAL JAW 4

## (undated) DEVICE — VITAL SIGNS™ JACKSON-REES CIRCUITS: Brand: VITAL SIGNS™

## (undated) DEVICE — TUBING, SUCTION, 1/4" X 10', STRAIGHT: Brand: MEDLINE

## (undated) DEVICE — SINGLE USE SUCTION VALVE MAJ-209: Brand: SINGLE USE SUCTION VALVE (STERILE)

## (undated) DEVICE — BLCK/BITE BLOX W/DENTL/RIM W/STRAP 54F

## (undated) DEVICE — SINGLE USE BIOPSY VALVE MAJ-210: Brand: SINGLE USE BIOPSY VALVE (STERILE)

## (undated) DEVICE — LN SMPL CO2 SHTRM SD STREAM W/M LUER

## (undated) DEVICE — ADAPT CLN BIOGUARD AIR/H2O DISP

## (undated) DEVICE — KT ORCA ORCAPOD DISP STRL

## (undated) DEVICE — ADAPT SWVL FIBROPTIC BRONCH